# Patient Record
Sex: FEMALE | Race: WHITE | NOT HISPANIC OR LATINO | Employment: OTHER | URBAN - METROPOLITAN AREA
[De-identification: names, ages, dates, MRNs, and addresses within clinical notes are randomized per-mention and may not be internally consistent; named-entity substitution may affect disease eponyms.]

---

## 2018-02-28 ENCOUNTER — APPOINTMENT (OUTPATIENT)
Dept: GENERAL RADIOLOGY | Facility: HOSPITAL | Age: 83
End: 2018-02-28
Attending: HOSPITALIST

## 2018-02-28 ENCOUNTER — APPOINTMENT (OUTPATIENT)
Dept: CARDIOLOGY | Facility: HOSPITAL | Age: 83
End: 2018-02-28

## 2018-02-28 ENCOUNTER — HOSPITAL ENCOUNTER (INPATIENT)
Facility: HOSPITAL | Age: 83
LOS: 3 days | Discharge: HOME HEALTH | End: 2018-03-03
Attending: HOSPITALIST | Admitting: INTERNAL MEDICINE

## 2018-02-28 DIAGNOSIS — D50.0 ANEMIA DUE TO BLOOD LOSS: ICD-10-CM

## 2018-02-28 DIAGNOSIS — K92.2 LOWER GI BLEED: ICD-10-CM

## 2018-02-28 DIAGNOSIS — K92.2 GASTROINTESTINAL HEMORRHAGE, UNSPECIFIED GASTROINTESTINAL HEMORRHAGE TYPE: Primary | ICD-10-CM

## 2018-02-28 PROBLEM — I48.91 ATRIAL FIBRILLATION: Status: ACTIVE | Noted: 2018-02-28

## 2018-02-28 PROBLEM — D62 ANEMIA DUE TO BLOOD LOSS, ACUTE: Status: ACTIVE | Noted: 2018-02-28

## 2018-02-28 PROBLEM — I10 ESSENTIAL HYPERTENSION, BENIGN: Status: ACTIVE | Noted: 2018-02-28

## 2018-02-28 LAB
ABO GROUP BLD: NORMAL
APTT PPP: 55.3 SECONDS (ref 22.7–35.4)
BH CV LOWER VASCULAR LEFT COMMON FEMORAL AUGMENT: NORMAL
BH CV LOWER VASCULAR LEFT COMMON FEMORAL COMPETENT: NORMAL
BH CV LOWER VASCULAR LEFT COMMON FEMORAL COMPRESS: NORMAL
BH CV LOWER VASCULAR LEFT COMMON FEMORAL PHASIC: NORMAL
BH CV LOWER VASCULAR LEFT COMMON FEMORAL SPONT: NORMAL
BH CV LOWER VASCULAR LEFT DISTAL FEMORAL COMPRESS: NORMAL
BH CV LOWER VASCULAR LEFT GASTRONEMIUS COMPRESS: NORMAL
BH CV LOWER VASCULAR LEFT GREATER SAPH AK COMPRESS: NORMAL
BH CV LOWER VASCULAR LEFT GREATER SAPH BK COMPRESS: NORMAL
BH CV LOWER VASCULAR LEFT MID FEMORAL AUGMENT: NORMAL
BH CV LOWER VASCULAR LEFT MID FEMORAL COMPETENT: NORMAL
BH CV LOWER VASCULAR LEFT MID FEMORAL COMPRESS: NORMAL
BH CV LOWER VASCULAR LEFT MID FEMORAL PHASIC: NORMAL
BH CV LOWER VASCULAR LEFT MID FEMORAL SPONT: NORMAL
BH CV LOWER VASCULAR LEFT PERONEAL COMPRESS: NORMAL
BH CV LOWER VASCULAR LEFT POPLITEAL AUGMENT: NORMAL
BH CV LOWER VASCULAR LEFT POPLITEAL COMPETENT: NORMAL
BH CV LOWER VASCULAR LEFT POPLITEAL COMPRESS: NORMAL
BH CV LOWER VASCULAR LEFT POPLITEAL PHASIC: NORMAL
BH CV LOWER VASCULAR LEFT POPLITEAL SPONT: NORMAL
BH CV LOWER VASCULAR LEFT POSTERIOR TIBIAL COMPRESS: NORMAL
BH CV LOWER VASCULAR LEFT PROXIMAL FEMORAL COMPRESS: NORMAL
BH CV LOWER VASCULAR LEFT SAPHENOFEMORAL JUNCTION AUGMENT: NORMAL
BH CV LOWER VASCULAR LEFT SAPHENOFEMORAL JUNCTION COMPETENT: NORMAL
BH CV LOWER VASCULAR LEFT SAPHENOFEMORAL JUNCTION COMPRESS: NORMAL
BH CV LOWER VASCULAR LEFT SAPHENOFEMORAL JUNCTION PHASIC: NORMAL
BH CV LOWER VASCULAR LEFT SAPHENOFEMORAL JUNCTION SPONT: NORMAL
BH CV LOWER VASCULAR RIGHT COMMON FEMORAL AUGMENT: NORMAL
BH CV LOWER VASCULAR RIGHT COMMON FEMORAL COMPETENT: NORMAL
BH CV LOWER VASCULAR RIGHT COMMON FEMORAL COMPRESS: NORMAL
BH CV LOWER VASCULAR RIGHT COMMON FEMORAL PHASIC: NORMAL
BH CV LOWER VASCULAR RIGHT COMMON FEMORAL SPONT: NORMAL
BH CV LOWER VASCULAR RIGHT DISTAL FEMORAL COMPRESS: NORMAL
BH CV LOWER VASCULAR RIGHT GASTRONEMIUS COMPRESS: NORMAL
BH CV LOWER VASCULAR RIGHT GREATER SAPH AK COMPRESS: NORMAL
BH CV LOWER VASCULAR RIGHT GREATER SAPH BK COMPRESS: NORMAL
BH CV LOWER VASCULAR RIGHT MID FEMORAL AUGMENT: NORMAL
BH CV LOWER VASCULAR RIGHT MID FEMORAL COMPETENT: NORMAL
BH CV LOWER VASCULAR RIGHT MID FEMORAL COMPRESS: NORMAL
BH CV LOWER VASCULAR RIGHT MID FEMORAL PHASIC: NORMAL
BH CV LOWER VASCULAR RIGHT MID FEMORAL SPONT: NORMAL
BH CV LOWER VASCULAR RIGHT PERONEAL COMPRESS: NORMAL
BH CV LOWER VASCULAR RIGHT POPLITEAL AUGMENT: NORMAL
BH CV LOWER VASCULAR RIGHT POPLITEAL COMPETENT: NORMAL
BH CV LOWER VASCULAR RIGHT POPLITEAL COMPRESS: NORMAL
BH CV LOWER VASCULAR RIGHT POPLITEAL PHASIC: NORMAL
BH CV LOWER VASCULAR RIGHT POPLITEAL SPONT: NORMAL
BH CV LOWER VASCULAR RIGHT POSTERIOR TIBIAL COMPRESS: NORMAL
BH CV LOWER VASCULAR RIGHT PROXIMAL FEMORAL COMPRESS: NORMAL
BH CV LOWER VASCULAR RIGHT SAPHENOFEMORAL JUNCTION AUGMENT: NORMAL
BH CV LOWER VASCULAR RIGHT SAPHENOFEMORAL JUNCTION COMPETENT: NORMAL
BH CV LOWER VASCULAR RIGHT SAPHENOFEMORAL JUNCTION COMPRESS: NORMAL
BH CV LOWER VASCULAR RIGHT SAPHENOFEMORAL JUNCTION PHASIC: NORMAL
BH CV LOWER VASCULAR RIGHT SAPHENOFEMORAL JUNCTION SPONT: NORMAL
BLD GP AB SCN SERPL QL: NEGATIVE
DEPRECATED RDW RBC AUTO: 63.5 FL (ref 37–54)
ERYTHROCYTE [DISTWIDTH] IN BLOOD BY AUTOMATED COUNT: 20.3 % (ref 11.7–13)
HCT VFR BLD AUTO: 19 % (ref 35.6–45.5)
HGB BLD-MCNC: 5.3 G/DL (ref 11.9–15.5)
MCH RBC QN AUTO: 23.9 PG (ref 26.9–32)
MCHC RBC AUTO-ENTMCNC: 27.9 G/DL (ref 32.4–36.3)
MCV RBC AUTO: 85.6 FL (ref 80.5–98.2)
PLATELET # BLD AUTO: 527 10*3/MM3 (ref 140–500)
PMV BLD AUTO: 10 FL (ref 6–12)
RBC # BLD AUTO: 2.22 10*6/MM3 (ref 3.9–5.2)
RH BLD: NEGATIVE
WBC NRBC COR # BLD: 10.04 10*3/MM3 (ref 4.5–10.7)

## 2018-02-28 PROCEDURE — 93010 ELECTROCARDIOGRAM REPORT: CPT | Performed by: INTERNAL MEDICINE

## 2018-02-28 PROCEDURE — 93306 TTE W/DOPPLER COMPLETE: CPT | Performed by: INTERNAL MEDICINE

## 2018-02-28 PROCEDURE — 71045 X-RAY EXAM CHEST 1 VIEW: CPT

## 2018-02-28 PROCEDURE — 86901 BLOOD TYPING SEROLOGIC RH(D): CPT | Performed by: INTERNAL MEDICINE

## 2018-02-28 PROCEDURE — 86901 BLOOD TYPING SEROLOGIC RH(D): CPT

## 2018-02-28 PROCEDURE — 86900 BLOOD TYPING SEROLOGIC ABO: CPT

## 2018-02-28 PROCEDURE — 93005 ELECTROCARDIOGRAM TRACING: CPT | Performed by: NURSE PRACTITIONER

## 2018-02-28 PROCEDURE — 86923 COMPATIBILITY TEST ELECTRIC: CPT

## 2018-02-28 PROCEDURE — 86900 BLOOD TYPING SEROLOGIC ABO: CPT | Performed by: INTERNAL MEDICINE

## 2018-02-28 PROCEDURE — 93970 EXTREMITY STUDY: CPT

## 2018-02-28 PROCEDURE — 36430 TRANSFUSION BLD/BLD COMPNT: CPT

## 2018-02-28 PROCEDURE — 99221 1ST HOSP IP/OBS SF/LOW 40: CPT | Performed by: INTERNAL MEDICINE

## 2018-02-28 PROCEDURE — P9016 RBC LEUKOCYTES REDUCED: HCPCS

## 2018-02-28 PROCEDURE — 85730 THROMBOPLASTIN TIME PARTIAL: CPT | Performed by: HOSPITALIST

## 2018-02-28 PROCEDURE — 85027 COMPLETE CBC AUTOMATED: CPT | Performed by: HOSPITALIST

## 2018-02-28 PROCEDURE — 93306 TTE W/DOPPLER COMPLETE: CPT

## 2018-02-28 PROCEDURE — 86850 RBC ANTIBODY SCREEN: CPT | Performed by: INTERNAL MEDICINE

## 2018-02-28 RX ORDER — HYDROCHLOROTHIAZIDE 12.5 MG/1
12.5 TABLET ORAL DAILY
COMMUNITY
End: 2022-05-31 | Stop reason: HOSPADM

## 2018-02-28 RX ORDER — POLYETHYLENE GLYCOL 3350 17 G/17G
17 POWDER, FOR SOLUTION ORAL DAILY
COMMUNITY
End: 2022-08-10

## 2018-02-28 RX ORDER — ASPIRIN 81 MG/1
81 TABLET, CHEWABLE ORAL DAILY
COMMUNITY
End: 2022-11-10 | Stop reason: HOSPADM

## 2018-02-28 RX ORDER — ALENDRONATE SODIUM 70 MG/1
70 TABLET ORAL
COMMUNITY
Start: 2018-02-27 | End: 2022-11-10 | Stop reason: HOSPADM

## 2018-02-28 RX ORDER — ONDANSETRON 2 MG/ML
4 INJECTION INTRAMUSCULAR; INTRAVENOUS EVERY 6 HOURS PRN
Status: DISCONTINUED | OUTPATIENT
Start: 2018-02-28 | End: 2018-03-03 | Stop reason: HOSPADM

## 2018-02-28 RX ORDER — MIRTAZAPINE 15 MG/1
15 TABLET, FILM COATED ORAL NIGHTLY
COMMUNITY

## 2018-02-28 RX ORDER — POTASSIUM CHLORIDE 750 MG/1
20 TABLET, FILM COATED, EXTENDED RELEASE ORAL NIGHTLY
COMMUNITY
End: 2022-05-31 | Stop reason: HOSPADM

## 2018-02-28 RX ORDER — SODIUM CHLORIDE 0.9 % (FLUSH) 0.9 %
1-10 SYRINGE (ML) INJECTION AS NEEDED
Status: DISCONTINUED | OUTPATIENT
Start: 2018-02-28 | End: 2018-03-03 | Stop reason: HOSPADM

## 2018-02-28 RX ORDER — OMEPRAZOLE 20 MG/1
20 CAPSULE, DELAYED RELEASE ORAL DAILY
COMMUNITY
End: 2022-11-10 | Stop reason: HOSPADM

## 2018-02-28 RX ORDER — GABAPENTIN 100 MG/1
100 CAPSULE ORAL 2 TIMES DAILY
Status: DISCONTINUED | OUTPATIENT
Start: 2018-02-28 | End: 2018-03-03 | Stop reason: HOSPADM

## 2018-02-28 RX ORDER — AMLODIPINE BESYLATE 5 MG/1
5 TABLET ORAL DAILY
COMMUNITY
End: 2022-11-10 | Stop reason: HOSPADM

## 2018-02-28 RX ORDER — AMIODARONE HYDROCHLORIDE 200 MG/1
200 TABLET ORAL DAILY
COMMUNITY

## 2018-02-28 RX ORDER — TRAMADOL HYDROCHLORIDE 50 MG/1
50 TABLET ORAL EVERY 6 HOURS PRN
Status: DISCONTINUED | OUTPATIENT
Start: 2018-02-28 | End: 2018-03-03 | Stop reason: HOSPADM

## 2018-02-28 RX ORDER — POLYETHYLENE GLYCOL 3350, SODIUM CHLORIDE, POTASSIUM CHLORIDE, SODIUM BICARBONATE, AND SODIUM SULFATE 240; 5.84; 2.98; 6.72; 22.72 G/4L; G/4L; G/4L; G/4L; G/4L
2000 POWDER, FOR SOLUTION ORAL 2 TIMES DAILY
Status: DISCONTINUED | OUTPATIENT
Start: 2018-02-28 | End: 2018-02-28

## 2018-02-28 RX ORDER — ACETAMINOPHEN 500 MG
1000 TABLET ORAL EVERY 4 HOURS PRN
Status: ON HOLD | COMMUNITY
End: 2022-05-25

## 2018-02-28 RX ORDER — POLYETHYLENE GLYCOL 3350 17 G/17G
17 POWDER, FOR SOLUTION ORAL DAILY
Status: DISCONTINUED | OUTPATIENT
Start: 2018-02-28 | End: 2018-03-03 | Stop reason: HOSPADM

## 2018-02-28 RX ORDER — AMIODARONE HYDROCHLORIDE 200 MG/1
100 TABLET ORAL 2 TIMES DAILY
Status: DISCONTINUED | OUTPATIENT
Start: 2018-02-28 | End: 2018-03-03 | Stop reason: HOSPADM

## 2018-02-28 RX ORDER — GABAPENTIN 100 MG/1
100 CAPSULE ORAL 2 TIMES DAILY
COMMUNITY

## 2018-02-28 RX ORDER — BISACODYL 5 MG/1
5 TABLET, DELAYED RELEASE ORAL DAILY PRN
COMMUNITY
End: 2018-03-03 | Stop reason: HOSPADM

## 2018-02-28 RX ORDER — MIRTAZAPINE 15 MG/1
15 TABLET, FILM COATED ORAL NIGHTLY
Status: DISCONTINUED | OUTPATIENT
Start: 2018-02-28 | End: 2018-03-03 | Stop reason: HOSPADM

## 2018-02-28 RX ORDER — AMLODIPINE BESYLATE 5 MG/1
5 TABLET ORAL DAILY
Status: DISCONTINUED | OUTPATIENT
Start: 2018-02-28 | End: 2018-03-03 | Stop reason: HOSPADM

## 2018-02-28 RX ORDER — DOCUSATE SODIUM 100 MG/1
100 CAPSULE, LIQUID FILLED ORAL 2 TIMES DAILY PRN
COMMUNITY

## 2018-02-28 RX ORDER — TRAMADOL HYDROCHLORIDE 50 MG/1
50 TABLET ORAL EVERY 6 HOURS PRN
COMMUNITY
End: 2022-05-31 | Stop reason: HOSPADM

## 2018-02-28 RX ORDER — OMEGA-3S/DHA/EPA/FISH OIL/D3 300MG-1000
400 CAPSULE ORAL 2 TIMES DAILY
COMMUNITY

## 2018-02-28 RX ORDER — PANTOPRAZOLE SODIUM 40 MG/1
40 TABLET, DELAYED RELEASE ORAL EVERY MORNING
Status: DISCONTINUED | OUTPATIENT
Start: 2018-02-28 | End: 2018-03-03 | Stop reason: HOSPADM

## 2018-02-28 RX ADMIN — GABAPENTIN 100 MG: 100 CAPSULE ORAL at 21:20

## 2018-02-28 RX ADMIN — AMLODIPINE BESYLATE 5 MG: 5 TABLET ORAL at 10:17

## 2018-02-28 RX ADMIN — PANTOPRAZOLE SODIUM 40 MG: 40 TABLET, DELAYED RELEASE ORAL at 10:17

## 2018-02-28 RX ADMIN — GABAPENTIN 100 MG: 100 CAPSULE ORAL at 10:17

## 2018-02-28 RX ADMIN — AMIODARONE HYDROCHLORIDE 100 MG: 200 TABLET ORAL at 21:19

## 2018-02-28 RX ADMIN — METOPROLOL TARTRATE 12.5 MG: 25 TABLET ORAL at 10:17

## 2018-02-28 RX ADMIN — AMIODARONE HYDROCHLORIDE 100 MG: 200 TABLET ORAL at 10:17

## 2018-02-28 RX ADMIN — MIRTAZAPINE 15 MG: 15 TABLET, FILM COATED ORAL at 21:20

## 2018-02-28 RX ADMIN — METOPROLOL TARTRATE 12.5 MG: 25 TABLET ORAL at 21:50

## 2018-03-01 PROBLEM — D68.32 HEMORRHAGIC DISORDER DUE TO EXTRINSIC CIRCULATING ANTICOAGULANTS: Status: ACTIVE | Noted: 2018-03-01

## 2018-03-01 PROBLEM — I50.33 ACUTE ON CHRONIC DIASTOLIC CONGESTIVE HEART FAILURE: Status: ACTIVE | Noted: 2018-03-01

## 2018-03-01 PROBLEM — J96.01 ACUTE RESPIRATORY FAILURE WITH HYPOXIA: Status: ACTIVE | Noted: 2018-03-01

## 2018-03-01 LAB
ABO + RH BLD: NORMAL
ALBUMIN SERPL-MCNC: 2.8 G/DL (ref 3.5–5.2)
ALBUMIN/GLOB SERPL: 0.8 G/DL
ALP SERPL-CCNC: 79 U/L (ref 39–117)
ALT SERPL W P-5'-P-CCNC: 5 U/L (ref 1–33)
ANION GAP SERPL CALCULATED.3IONS-SCNC: 13.1 MMOL/L
ARTERIAL PATENCY WRIST A: POSITIVE
AST SERPL-CCNC: 13 U/L (ref 1–32)
ATMOSPHERIC PRESS: 741.1 MMHG
BASE EXCESS BLDA CALC-SCNC: 1 MMOL/L (ref 0–2)
BASOPHILS # BLD AUTO: 0.02 10*3/MM3 (ref 0–0.2)
BASOPHILS NFR BLD AUTO: 0.2 % (ref 0–1.5)
BDY SITE: ABNORMAL
BH BB BLOOD EXPIRATION DATE: NORMAL
BH BB BLOOD TYPE BARCODE: 9500
BH BB DISPENSE STATUS: NORMAL
BH BB PRODUCT CODE: NORMAL
BH BB UNIT NUMBER: NORMAL
BH CV ECHO MEAS - ACS: 2 CM
BH CV ECHO MEAS - AO MEAN PG (FULL): 1 MMHG
BH CV ECHO MEAS - AO MEAN PG: 4 MMHG
BH CV ECHO MEAS - AO ROOT AREA (BSA CORRECTED): 1.7
BH CV ECHO MEAS - AO ROOT AREA: 6.6 CM^2
BH CV ECHO MEAS - AO ROOT DIAM: 2.9 CM
BH CV ECHO MEAS - AO V2 MEAN: 96.1 CM/SEC
BH CV ECHO MEAS - AO V2 VTI: 35.9 CM
BH CV ECHO MEAS - AVA(I,A): 2.3 CM^2
BH CV ECHO MEAS - AVA(I,D): 2.3 CM^2
BH CV ECHO MEAS - BSA(HAYCOCK): 1.8 M^2
BH CV ECHO MEAS - BSA: 1.7 M^2
BH CV ECHO MEAS - BZI_BMI: 25.4 KILOGRAMS/M^2
BH CV ECHO MEAS - BZI_METRIC_HEIGHT: 162.6 CM
BH CV ECHO MEAS - BZI_METRIC_WEIGHT: 67.1 KG
BH CV ECHO MEAS - CONTRAST EF (2CH): 54.5 ML/M^2
BH CV ECHO MEAS - CONTRAST EF 4CH: 62.9 ML/M^2
BH CV ECHO MEAS - EDV(CUBED): 103.8 ML
BH CV ECHO MEAS - EDV(MOD-SP2): 110 ML
BH CV ECHO MEAS - EDV(MOD-SP4): 97 ML
BH CV ECHO MEAS - EDV(TEICH): 102.4 ML
BH CV ECHO MEAS - EF(CUBED): 55.1 %
BH CV ECHO MEAS - EF(MOD-SP2): 54.5 %
BH CV ECHO MEAS - EF(MOD-SP4): 62.9 %
BH CV ECHO MEAS - EF(TEICH): 46.8 %
BH CV ECHO MEAS - ESV(CUBED): 46.7 ML
BH CV ECHO MEAS - ESV(MOD-SP2): 50 ML
BH CV ECHO MEAS - ESV(MOD-SP4): 36 ML
BH CV ECHO MEAS - ESV(TEICH): 54.4 ML
BH CV ECHO MEAS - FS: 23.4 %
BH CV ECHO MEAS - IVS/LVPW: 1
BH CV ECHO MEAS - IVSD: 0.9 CM
BH CV ECHO MEAS - LAT PEAK E' VEL: 8 CM/SEC
BH CV ECHO MEAS - LV DIASTOLIC VOL/BSA (35-75): 56.4 ML/M^2
BH CV ECHO MEAS - LV MASS(C)D: 142.7 GRAMS
BH CV ECHO MEAS - LV MASS(C)DI: 82.9 GRAMS/M^2
BH CV ECHO MEAS - LV MEAN PG: 3 MMHG
BH CV ECHO MEAS - LV SYSTOLIC VOL/BSA (12-30): 20.9 ML/M^2
BH CV ECHO MEAS - LV V1 MAX: 120 CM/SEC
BH CV ECHO MEAS - LV V1 MEAN: 76.4 CM/SEC
BH CV ECHO MEAS - LV V1 VTI: 28.8 CM
BH CV ECHO MEAS - LVIDD: 4.7 CM
BH CV ECHO MEAS - LVIDS: 3.6 CM
BH CV ECHO MEAS - LVLD AP2: 8.2 CM
BH CV ECHO MEAS - LVLD AP4: 7.7 CM
BH CV ECHO MEAS - LVLS AP2: 7.2 CM
BH CV ECHO MEAS - LVLS AP4: 6.5 CM
BH CV ECHO MEAS - LVOT AREA (M): 2.8 CM^2
BH CV ECHO MEAS - LVOT AREA: 2.8 CM^2
BH CV ECHO MEAS - LVOT DIAM: 1.9 CM
BH CV ECHO MEAS - LVPWD: 0.9 CM
BH CV ECHO MEAS - MED PEAK E' VEL: 9 CM/SEC
BH CV ECHO MEAS - MR MAX PG: 68.4 MMHG
BH CV ECHO MEAS - MR MAX VEL: 413.5 CM/SEC
BH CV ECHO MEAS - MV A DUR: 0.11 SEC
BH CV ECHO MEAS - MV A MAX VEL: 87.1 CM/SEC
BH CV ECHO MEAS - MV DEC SLOPE: 785 CM/SEC^2
BH CV ECHO MEAS - MV DEC TIME: 0.19 SEC
BH CV ECHO MEAS - MV E MAX VEL: 116 CM/SEC
BH CV ECHO MEAS - MV E/A: 1.3
BH CV ECHO MEAS - MV MEAN PG: 2 MMHG
BH CV ECHO MEAS - MV P1/2T MAX VEL: 153 CM/SEC
BH CV ECHO MEAS - MV P1/2T: 57.1 MSEC
BH CV ECHO MEAS - MV V2 MEAN: 68.9 CM/SEC
BH CV ECHO MEAS - MV V2 VTI: 38.4 CM
BH CV ECHO MEAS - MVA P1/2T LCG: 1.4 CM^2
BH CV ECHO MEAS - MVA(P1/2T): 3.9 CM^2
BH CV ECHO MEAS - MVA(VTI): 2.1 CM^2
BH CV ECHO MEAS - PA ACC SLOPE: 16.8 CM/SEC^2
BH CV ECHO MEAS - PA ACC TIME: 0.16 SEC
BH CV ECHO MEAS - PA MAX PG (FULL): 1.5 MMHG
BH CV ECHO MEAS - PA MAX PG: 2.9 MMHG
BH CV ECHO MEAS - PA PR(ACCEL): 6.1 MMHG
BH CV ECHO MEAS - PA V2 MAX: 85.5 CM/SEC
BH CV ECHO MEAS - PULM A REVS DUR: 0.12 SEC
BH CV ECHO MEAS - PULM A REVS VEL: 29.1 CM/SEC
BH CV ECHO MEAS - PULM DIAS VEL: 66.1 CM/SEC
BH CV ECHO MEAS - PULM S/D: 0.85
BH CV ECHO MEAS - PULM SYS VEL: 56.3 CM/SEC
BH CV ECHO MEAS - PVA(V,A): 2.7 CM^2
BH CV ECHO MEAS - PVA(V,D): 2.7 CM^2
BH CV ECHO MEAS - QP/QS: 0.8
BH CV ECHO MEAS - RAP SYSTOLE: 15 MMHG
BH CV ECHO MEAS - RV MAX PG: 1.4 MMHG
BH CV ECHO MEAS - RV MEAN PG: 1 MMHG
BH CV ECHO MEAS - RV V1 MAX: 60.2 CM/SEC
BH CV ECHO MEAS - RV V1 MEAN: 39.9 CM/SEC
BH CV ECHO MEAS - RV V1 VTI: 17.1 CM
BH CV ECHO MEAS - RVOT AREA: 3.8 CM^2
BH CV ECHO MEAS - RVOT DIAM: 2.2 CM
BH CV ECHO MEAS - RVSP: 53 MMHG
BH CV ECHO MEAS - SI(AO): 137.8 ML/M^2
BH CV ECHO MEAS - SI(CUBED): 33.2 ML/M^2
BH CV ECHO MEAS - SI(LVOT): 47.4 ML/M^2
BH CV ECHO MEAS - SI(MOD-SP2): 34.9 ML/M^2
BH CV ECHO MEAS - SI(MOD-SP4): 35.4 ML/M^2
BH CV ECHO MEAS - SI(TEICH): 27.8 ML/M^2
BH CV ECHO MEAS - SUP REN AO DIAM: 2.3 CM
BH CV ECHO MEAS - SV(AO): 237.1 ML
BH CV ECHO MEAS - SV(CUBED): 57.2 ML
BH CV ECHO MEAS - SV(LVOT): 81.7 ML
BH CV ECHO MEAS - SV(MOD-SP2): 60 ML
BH CV ECHO MEAS - SV(MOD-SP4): 61 ML
BH CV ECHO MEAS - SV(RVOT): 65 ML
BH CV ECHO MEAS - SV(TEICH): 47.9 ML
BH CV ECHO MEAS - TAPSE (>1.6): 2.1 CM2
BH CV ECHO MEAS - TR MAX VEL: 308 CM/SEC
BH CV VAS BP RIGHT ARM: NORMAL MMHG
BH CV XLRA - RV BASE: 2.3 CM
BH CV XLRA - TDI S': 11 CM/SEC
BILIRUB SERPL-MCNC: 1.1 MG/DL (ref 0.1–1.2)
BUN BLD-MCNC: 14 MG/DL (ref 8–23)
BUN/CREAT SERPL: 16.1 (ref 7–25)
CALCIUM SPEC-SCNC: 8.4 MG/DL (ref 8.6–10.5)
CHLORIDE SERPL-SCNC: 98 MMOL/L (ref 98–107)
CO2 SERPL-SCNC: 27.9 MMOL/L (ref 22–29)
CREAT BLD-MCNC: 0.87 MG/DL (ref 0.57–1)
DEPRECATED RDW RBC AUTO: 58 FL (ref 37–54)
E/E' RATIO: 14
EOSINOPHIL # BLD AUTO: 0.07 10*3/MM3 (ref 0–0.7)
EOSINOPHIL NFR BLD AUTO: 0.6 % (ref 0.3–6.2)
ERYTHROCYTE [DISTWIDTH] IN BLOOD BY AUTOMATED COUNT: 18.4 % (ref 11.7–13)
GAS FLOW AIRWAY: 7 LPM
GFR SERPL CREATININE-BSD FRML MDRD: 62 ML/MIN/1.73
GLOBULIN UR ELPH-MCNC: 3.6 GM/DL
GLUCOSE BLD-MCNC: 84 MG/DL (ref 65–99)
HCO3 BLDA-SCNC: 25 MMOL/L (ref 22–28)
HCT VFR BLD AUTO: 34.7 % (ref 35.6–45.5)
HGB BLD-MCNC: 10.6 G/DL (ref 11.9–15.5)
IMM GRANULOCYTES # BLD: 0.05 10*3/MM3 (ref 0–0.03)
IMM GRANULOCYTES NFR BLD: 0.4 % (ref 0–0.5)
INR PPP: 1.45 (ref 0.9–1.1)
IRON 24H UR-MRATE: 31 MCG/DL (ref 37–145)
IRON SATN MFR SERPL: 7 % (ref 20–50)
LEFT ATRIUM VOLUME INDEX: 42 ML/M2
LYMPHOCYTES # BLD AUTO: 1.68 10*3/MM3 (ref 0.9–4.8)
LYMPHOCYTES NFR BLD AUTO: 14.2 % (ref 19.6–45.3)
MAXIMAL PREDICTED HEART RATE: 133 BPM
MCH RBC QN AUTO: 26.2 PG (ref 26.9–32)
MCHC RBC AUTO-ENTMCNC: 30.5 G/DL (ref 32.4–36.3)
MCV RBC AUTO: 85.7 FL (ref 80.5–98.2)
MODALITY: ABNORMAL
MONOCYTES # BLD AUTO: 1.39 10*3/MM3 (ref 0.2–1.2)
MONOCYTES NFR BLD AUTO: 11.8 % (ref 5–12)
NEUTROPHILS # BLD AUTO: 8.58 10*3/MM3 (ref 1.9–8.1)
NEUTROPHILS NFR BLD AUTO: 72.8 % (ref 42.7–76)
NRBC BLD MANUAL-RTO: 0.6 /100 WBC (ref 0–0)
NT-PROBNP SERPL-MCNC: 3392 PG/ML (ref 0–1800)
PCO2 BLDA: 36.4 MM HG (ref 35–45)
PH BLDA: 7.45 PH UNITS (ref 7.35–7.45)
PLATELET # BLD AUTO: 504 10*3/MM3 (ref 140–500)
PMV BLD AUTO: 10.4 FL (ref 6–12)
PO2 BLDA: 61.3 MM HG (ref 80–100)
POTASSIUM BLD-SCNC: 3.6 MMOL/L (ref 3.5–5.2)
PROT SERPL-MCNC: 6.4 G/DL (ref 6–8.5)
PROTHROMBIN TIME: 17.4 SECONDS (ref 11.7–14.2)
RBC # BLD AUTO: 4.05 10*6/MM3 (ref 3.9–5.2)
SAO2 % BLDCOA: 92.2 % (ref 92–99)
SET MECH RESP RATE: 22
SODIUM BLD-SCNC: 139 MMOL/L (ref 136–145)
STRESS TARGET HR: 113 BPM
TIBC SERPL-MCNC: 426 MCG/DL
TRANSFERRIN SERPL-MCNC: 286 MG/DL (ref 200–360)
UNIT  ABO: NORMAL
UNIT  RH: NORMAL
WBC NRBC COR # BLD: 11.79 10*3/MM3 (ref 4.5–10.7)

## 2018-03-01 PROCEDURE — 36600 WITHDRAWAL OF ARTERIAL BLOOD: CPT

## 2018-03-01 PROCEDURE — 94799 UNLISTED PULMONARY SVC/PX: CPT

## 2018-03-01 PROCEDURE — 93010 ELECTROCARDIOGRAM REPORT: CPT | Performed by: INTERNAL MEDICINE

## 2018-03-01 PROCEDURE — 84443 ASSAY THYROID STIM HORMONE: CPT | Performed by: NURSE PRACTITIONER

## 2018-03-01 PROCEDURE — 99232 SBSQ HOSP IP/OBS MODERATE 35: CPT | Performed by: INTERNAL MEDICINE

## 2018-03-01 PROCEDURE — 83880 ASSAY OF NATRIURETIC PEPTIDE: CPT | Performed by: INTERNAL MEDICINE

## 2018-03-01 PROCEDURE — 85610 PROTHROMBIN TIME: CPT | Performed by: HOSPITALIST

## 2018-03-01 PROCEDURE — 25010000002 FUROSEMIDE PER 20 MG: Performed by: HOSPITALIST

## 2018-03-01 PROCEDURE — 25010000002 ONDANSETRON PER 1 MG: Performed by: INTERNAL MEDICINE

## 2018-03-01 PROCEDURE — 84466 ASSAY OF TRANSFERRIN: CPT | Performed by: HOSPITALIST

## 2018-03-01 PROCEDURE — 80053 COMPREHEN METABOLIC PANEL: CPT | Performed by: HOSPITALIST

## 2018-03-01 PROCEDURE — 93005 ELECTROCARDIOGRAM TRACING: CPT | Performed by: NURSE PRACTITIONER

## 2018-03-01 PROCEDURE — 83540 ASSAY OF IRON: CPT | Performed by: HOSPITALIST

## 2018-03-01 PROCEDURE — 82803 BLOOD GASES ANY COMBINATION: CPT

## 2018-03-01 PROCEDURE — 85025 COMPLETE CBC W/AUTO DIFF WBC: CPT | Performed by: NURSE PRACTITIONER

## 2018-03-01 RX ORDER — FUROSEMIDE 10 MG/ML
40 INJECTION INTRAMUSCULAR; INTRAVENOUS ONCE
Status: COMPLETED | OUTPATIENT
Start: 2018-03-01 | End: 2018-03-01

## 2018-03-01 RX ADMIN — FUROSEMIDE 40 MG: 10 INJECTION, SOLUTION INTRAMUSCULAR; INTRAVENOUS at 02:56

## 2018-03-01 RX ADMIN — POLYETHYLENE GLYCOL (3350) 17 G: 17 POWDER, FOR SOLUTION ORAL at 08:35

## 2018-03-01 RX ADMIN — AMIODARONE HYDROCHLORIDE 100 MG: 200 TABLET ORAL at 08:35

## 2018-03-01 RX ADMIN — PANTOPRAZOLE SODIUM 40 MG: 40 TABLET, DELAYED RELEASE ORAL at 06:06

## 2018-03-01 RX ADMIN — TRAMADOL HYDROCHLORIDE 50 MG: 50 TABLET, FILM COATED ORAL at 00:19

## 2018-03-01 RX ADMIN — GABAPENTIN 100 MG: 100 CAPSULE ORAL at 20:10

## 2018-03-01 RX ADMIN — METOPROLOL TARTRATE 12.5 MG: 25 TABLET ORAL at 20:11

## 2018-03-01 RX ADMIN — AMIODARONE HYDROCHLORIDE 100 MG: 200 TABLET ORAL at 20:09

## 2018-03-01 RX ADMIN — MIRTAZAPINE 15 MG: 15 TABLET, FILM COATED ORAL at 20:10

## 2018-03-01 RX ADMIN — GABAPENTIN 100 MG: 100 CAPSULE ORAL at 08:35

## 2018-03-01 RX ADMIN — METOPROLOL TARTRATE 12.5 MG: 25 TABLET ORAL at 08:35

## 2018-03-01 RX ADMIN — AMLODIPINE BESYLATE 5 MG: 5 TABLET ORAL at 08:35

## 2018-03-01 RX ADMIN — ONDANSETRON 4 MG: 2 INJECTION INTRAMUSCULAR; INTRAVENOUS at 01:55

## 2018-03-01 RX ADMIN — TRAMADOL HYDROCHLORIDE 50 MG: 50 TABLET, FILM COATED ORAL at 20:10

## 2018-03-01 NOTE — PLAN OF CARE
Problem: Patient Care Overview (Adult)  Goal: Plan of Care Review  Outcome: Ongoing (interventions implemented as appropriate)   03/01/18 0457   Coping/Psychosocial Response Interventions   Plan Of Care Reviewed With patient   Patient Care Overview   Progress no change   Outcome Evaluation   Outcome Summary/Follow up Plan PT SLEEPING BETWEEEN CARE, TRANSFUSED 1PRBC WITH NO ADVERSE REACTIONS. PT HAD PROBLEM WITH KEEPINGS O2 SATS AT 90% AND ABOVE AFTER LAST BLOOD TRANSFUSION, NOTIFIED RT TO EVAL, PT WSITCH TO HIFLOW 02 SINCE 6LPM/NC WASNT EFFECTIVE,, CARY UP TO 90-91% ON HIFLOW,7LPMNC. LUNGS DIMINISHED AND JEANNETTE CERRATO MD. CALLED. ORDERS PER DR. SOL FOR IV LASIX 40MG X1.. HOB ELEVATED, NO FURTEHR SOB. SATS STAYING AT 92-93% ON HI FLOW.      Goal: Adult Individualization and Mutuality  Outcome: Ongoing (interventions implemented as appropriate)    Goal: Discharge Needs Assessment  Outcome: Ongoing (interventions implemented as appropriate)      Problem: Pain, Acute (Adult)  Goal: Identify Related Risk Factors and Signs and Symptoms  Outcome: Ongoing (interventions implemented as appropriate)    Goal: Acceptable Pain Control/Comfort Level  Outcome: Ongoing (interventions implemented as appropriate)      Problem: Mobility, Physical Impaired (Adult)  Goal: Identify Related Risk Factors and Signs and Symptoms  Outcome: Ongoing (interventions implemented as appropriate)    Goal: Enhanced Mobility Skills  Outcome: Ongoing (interventions implemented as appropriate)    Goal: Enhanced Functionality Ability  Outcome: Ongoing (interventions implemented as appropriate)      Problem: Wound, Traumatic, Nonburn (Adult)  Goal: Signs and Symptoms of Listed Potential Problems Will be Absent or Manageable (Wound, Traumatic, Nonburn)  Outcome: Ongoing (interventions implemented as appropriate)      Problem: Pressure Ulcer Risk (Curtis Scale) (Adult,Obstetrics,Pediatric)  Goal: Identify Related Risk Factors and Signs and  Symptoms  Outcome: Ongoing (interventions implemented as appropriate)    Goal: Skin Integrity  Outcome: Ongoing (interventions implemented as appropriate)

## 2018-03-01 NOTE — PROGRESS NOTES
Called regarding increased SOA and hypoxia. Placed on 7 L high flow with sats 85%. Initial CXR suggests edema/effusions. Hx AFib, CHF. Received 2 units pRBC. Will give stat dose Lasix 40mg IV. May need additional Lasix. Echo with normal EF. Cardiology following.     Paul De Jesus MD  [facility]  03/01/18  2:55 AM

## 2018-03-01 NOTE — PROGRESS NOTES
[facility]  Cardiology Progress Note    Patient Identification:  Name: Aria Fernando  Age: 87 y.o.  Sex: female  :  1930  MRN: [id number]                 Follow Up / Chief Complaint: AFIB ON XARELTO WITH ACUTE GIB    Interval History: 87-year-old patient  with a history of chronic atrial fibrillation, left internal carotid stenosis with history of left carotid endarterectomy with bovine pericardial patch by Dr. Wil Villalobos in , hyperlipidemia,  and hypertension.  Patient was transferred from [facility] with a hemoglobin of 4.1 after having a history of weakness and passing bright red blood per rectum for almost a week.  She has a history of atrial fibrillation and DVT and was on Xarelto as an outpatient.  She had previous history of bleeding while on Coumadin per EMR but does not recall this.   Pt has been transfused with  PRBC's and Hgb 10.6 3/ up from 5.3 on admit .      Subjective: shob noted while getting blood transfusions, no cp/pressure      Objective:  Was given IV lasix one time dose after blood transfusions d/t volume overload by attending    Temp:  [97.8 °F (36.6 °C)-98.8 °F (37.1 °C)] 98.8 °F (37.1 °C)  Heart Rate:  [64-75] 72  Resp:  [18-24] 19  BP: (111-172)/(65-86) 111/65    hgb 10.6 (5.3), creat 0.87 (1.14),  K+ 3.6    Past Medical History:  Past Medical History:   Diagnosis Date   • A-fib    • Anticoagulant-induced bleeding    • Arthritis    • CHF (congestive heart failure)    • Coronary artery disease    • DVT (deep venous thrombosis)    • GERD (gastroesophageal reflux disease)    • History of transfusion    • Hyperlipidemia    • Hypertension    • 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 No pertinent family history.       Allergies:  Allergies   Allergen Reactions   • Clinoril [Sulindac] Itching   • Codeine Itching   • Coumadin [Warfarin Sodium] GI Bleeding   • Ibuprofen-Oxycodone [Oxycodone-Ibuprofen] Itching   • Keflex [Cephalexin] Itching   • Mydriacyl [Tropicamide] Angioedema   • Penicillins Angioedema   • Phenylephrine Hcl Angioedema   • Sulfa Antibiotics Angioedema   • Zantac [Ranitidine Hcl] Nausea And Vomiting     Scheduled Meds:    amiodarone 100 mg BID   amLODIPine 5 mg Daily   gabapentin 100 mg BID   metoprolol tartrate 12.5 mg Q12H   mirtazapine 15 mg Nightly   pantoprazole 40 mg QAM   polyethylene glycol 17 g Daily           INTAKE AND OUTPUT:    Intake/Output Summary (Last 24 hours) at 03/01/18 1549  Last data filed at 03/01/18 0741   Gross per 24 hour   Intake              900 ml   Output             2950 ml   Net            -2050 ml       Review of Systems:   GI: no abd pain or n/v  Cardiac: no cp, + orthopnea  Pulmonary: + shob, denies cough    Constitutional:  Temp:  [97.8 °F (36.6 °C)-98.8 °F (37.1 °C)] 98.8 °F (37.1 °C)  Heart Rate:  [64-75] 72  Resp:  [18-24] 19  BP: (111-172)/(65-86) 111/65    Physical Exam:  General:  Appears in no acute distress  Eyes: PERTL,  HEENT:  No JVD. Thyroid not visibly enlarged. No mucosal pallor or cyanosis  Respiratory: Respirations regular and unlabored at rest. BBS with good air entry in all fields. No crackles, rubs or wheezes auscultated  Cardiovascular: S1S2 Regular rate and rhythm. No murmur, rub or gallop. No carotid bruits. DP/PT pulses  2+   . No pretibial pitting edema  Gastrointestinal: Abdomen soft, flat, non tender. Bowel sounds present. No hepatosplenomegaly. No ascites  Musculoskeletal: NUNES x4. No abnormal movements  Extremities: No digital clubbing or  cyanosis  Skin: Color pink. Skin warm and dry to touch. No rashes    Neuro: AAO x3 CN II-XII grossly intact  Psych: Mood and affect normal, pleasant and cooperative      Cardiographics  Telemetry:  SR        ECG:     3/1/18             Echocardiogram:   3/1/18     Interpretation Summary      · Left atrial cavity size is mildly dilated.  · There is calcification of the aortic valve.  · Mild-to-moderate mitral valve regurgitation is present  · Mild tricuspid valve regurgitation is present.  · Left Ventricle: Calculated EF = 62.9%.  · There is no evidence of pericardial effusion         Lab Review             Results from last 7 days  Lab Units 03/01/18  0532   SODIUM mmol/L 139   POTASSIUM mmol/L 3.6   BUN mg/dL 14   CREATININE mg/dL 0.87   CALCIUM mg/dL 8.4*       proBNP 3392 on 3/1/18      Results from last 7 days  Lab Units 03/01/18  0532 02/28/18  0715   WBC 10*3/mm3 11.79* 10.04   HEMOGLOBIN g/dL 10.6* 5.3*   HEMATOCRIT % 34.7* 19.0*   PLATELETS 10*3/mm3 504* 527*       Results from last 7 days  Lab Units 03/01/18  0532 02/28/18  0715   INR  1.45*  --    APTT seconds  --  55.3*     Venous doppler 2/28/18     Interpretation Summary      · Normal bilateral lower extremity venous duplex scan.                 Active Hospital Problems (** Indicates Principal Problem)      Diagnosis Date Noted   • Anemia due to blood loss [D50.0] 02/28/2018   • Lower GI bleed [K92.2] 02/28/2018   • Atrial fibrillation [I48.91] 02/28/2018   • Anemia due to blood loss, acute [D62] 02/28/2018         Assessment/Plan:        - Hx of AFib: currently SR, is off home xarelto 20 mg d/t acute GIB.  12 lead EKG showing NS ST wave abnormalities in lateral leads. Stable since admission on 3/1 ekg. Echo 2/28/18  showing mild to mod MR with Mild TR and EF 63%.        -  HTN: adequate control.       - GIB acute: GI following.  No BM since admission.     Hemodynamically stable.      - Recent DVT 11/2017: lower extremity venous doppler 2/28/18 normal.   "        Labs/tests ordered for am: cbc      I reviewed the patient's new clinical results and treatment plan  I personally viewed and interpreted the patient's EKG/Telemetry data    )3/1/2018  Chaz Nelson MD      EMR Dragon/Transcription:   \"Dictated utilizing Dragon dictation\".   "

## 2018-03-01 NOTE — PROGRESS NOTES
Name: Aria Fernando ADMIT: 2018   : 1930  PCP: Luci Ingram MD    MRN: [id number] LOS: 1 days   AGE/SEX: 87 y.o. female    ROOM: [facility]/   Subjective   Patient is sitting up in a chair on 4 L of oxygen on Oxymizer and the saturation 97%.  No chest pain or shortness breath.  She had good urine output for the Lasix.    Brief hospital course since admission:  GI bleed  Acute anemia secondary to GI bleed  History of atrial fibrillation and history of DVT, was on Xarelto to which is been on hold    I have reviewed past medical history, social hisotory, family history, allergies.  No changes from admission note.      Review of Systems   No chest pain  No shortness of breath  No more rectal bleeding  No BM    Objective   Vital Signs  Temp:  [97.8 °F (36.6 °C)-98.8 °F (37.1 °C)] 98.8 °F (37.1 °C)  Heart Rate:  [62-75] 75  Resp:  [18-24] 22  BP: (131-172)/(66-86) 136/66  SpO2:  [89 %-94 %] 93 %  on  Flow (L/min):  [2-7] 4;   O2 Device: high-flow nasal cannula  Body mass index is 24.41 kg/(m^2).    Intake/Output Summary (Last 24 hours) at 18 1316  Last data filed at 18 0741   Gross per 24 hour   Intake             1200 ml   Output             2950 ml   Net            -1750 ml       Physical Exam   Constitutional: She is oriented to person, place, and time. She appears well-developed and well-nourished. No distress. Nasal cannula in place.   HENT:   Head: Normocephalic and atraumatic.   Eyes: Pupils are equal, round, and reactive to light. No scleral icterus.   Cardiovascular: Normal rate.  An irregular rhythm present.   Pulmonary/Chest: Effort normal. No respiratory distress. She has no decreased breath sounds. She has no wheezes.   Abdominal: Soft. Bowel sounds are normal. There is no hepatosplenomegaly.   Neurological: She is alert and oriented to person, place, and time.   Skin: No cyanosis. Nails show no clubbing.   Psychiatric: She has a normal mood and affect. Her behavior is normal.        Results Review:      Results from last 7 days  Lab Units 03/01/18  0532 02/28/18  0715   WBC 10*3/mm3 11.79* 10.04   HEMOGLOBIN g/dL 10.6* 5.3*   HEMATOCRIT % 34.7* 19.0*   PLATELETS 10*3/mm3 504* 527*       Results from last 7 days  Lab Units 03/01/18  0532   SODIUM mmol/L 139   POTASSIUM mmol/L 3.6   CHLORIDE mmol/L 98   CO2 mmol/L 27.9   BUN mg/dL 14   CREATININE mg/dL 0.87   GLUCOSE mg/dL 84   CALCIUM mg/dL 8.4*       Results from last 7 days  Lab Units 03/01/18  0532   INR  1.45*     Hemoglobin A1C:No results found for: HGBA1C  Glucose Range:No results found for: POCGLU      amiodarone 100 mg Oral BID   amLODIPine 5 mg Oral Daily   gabapentin 100 mg Oral BID   metoprolol tartrate 12.5 mg Oral Q12H   mirtazapine 15 mg Oral Nightly   pantoprazole 40 mg Oral QAM   polyethylene glycol 17 g Oral Daily      Diet Regular; Cardiac, GI Soft / Dickinson      Assessment/Plan   Active Hospital Problems (** Indicates Principal Problem)    Diagnosis Date Noted   • Hemorrhagic disorder due to extrinsic circulating anticoagulants [D68.32] 03/01/2018   • Acute respiratory failure with hypoxia [J96.01] 03/01/2018   • Acute on chronic diastolic congestive heart failure [I50.33] 03/01/2018   • Lower GI bleed [K92.2] 02/28/2018   • Atrial fibrillation [I48.91] 02/28/2018   • Anemia due to blood loss, acute [D62] 02/28/2018   • Essential hypertension, benign [I10] 02/28/2018      Resolved Hospital Problems    Diagnosis Date Noted Date Resolved   No resolved problems to display.     Patient is being transfused and her hemoglobin stable.  She is off Xarelto  EGD and colonoscopy per GI  Hypoxia secondary to fluid overload and pulmonary edema which is been resolved with Lasix.  The fluid overload is due to blood transfusion.  Will continue to wean oxygen        Joni Espinoza MD  [facility]  03/01/18

## 2018-03-01 NOTE — NURSING NOTE
PT VOIDING FREQUENTLY PER BEDPAN POST LASIX IV ADMINISTRATION, O2 SATS 93% ON HIFLOW O2 AT 7LPM/NC. HOB ELEVATED, NAD. DENIES SOB.

## 2018-03-01 NOTE — PROGRESS NOTES
BGA/GI Progress Note   Chief Complaint: vomiting, melena and rectal bleeding    Subjective     Interval History:   No BM since admission. No GI complains. SOB  History taken from: patient chart RN    Review of Systems:    The following systems were reviewed and negative;  gastrointestinal  See HPI  Objective     Vital Signs  Temp:  [97.5 °F (36.4 °C)-98.8 °F (37.1 °C)] 98.8 °F (37.1 °C)  Heart Rate:  [62-75] 75  Resp:  [18-24] 22  BP: (124-172)/(62-86) 136/66  Body mass index is 24.41 kg/(m^2).    Intake/Output Summary (Last 24 hours) at 03/01/18 1110  Last data filed at 03/01/18 0741   Gross per 24 hour   Intake             1200 ml   Output             2950 ml   Net            -1750 ml     I/O this shift:  In: -   Out: 400 [Urine:400]    Physical Exam:   General: patient awake, alert and cooperative, on Hiflow NC 5 L   Eyes: Normal lids and lashes, no scleral icterus, no conjunctival pallor   Neck: supple, normal ROM, no tracheal deviation, no thyromegaly   Skin: warm and dry, not jaundiced   Cardiovascular: regular rhythm and rate, no murmurs auscultated   Pulm: clear to auscultation bilaterally,decreased BS bilaterally    Abdomen: soft, nontender, nondistended; normal bowel sounds   Rectal: deferred   Extremities: no rash or edema   Neurologic: Normal mood and behavior    All Medications Have Been Reviewed     Results Review:       Results from last 7 days  Lab Units 03/01/18  0532 02/28/18  0715   WBC 10*3/mm3 11.79* 10.04   HEMOGLOBIN g/dL 10.6* 5.3*   HEMATOCRIT % 34.7* 19.0*   PLATELETS 10*3/mm3 504* 527*         Results from last 7 days  Lab Units 03/01/18  0532   SODIUM mmol/L 139   POTASSIUM mmol/L 3.6   CHLORIDE mmol/L 98   CO2 mmol/L 27.9   BUN mg/dL 14   CREATININE mg/dL 0.87   CALCIUM mg/dL 8.4*   BILIRUBIN mg/dL 1.1   ALK PHOS U/L 79   ALT (SGPT) U/L 5   AST (SGOT) U/L 13   GLUCOSE mg/dL 84         Results from last 7 days  Lab Units 03/01/18  0532   INR  1.45*       RADIOLOGY:    Imaging  Results (last 72 hours)     Procedure Component Value Units Date/Time    XR Chest 1 View [[id number]] Collected:  02/28/18 1232     Updated:  02/28/18 1237    Narrative:       XR CHEST 1 VW-     HISTORY: Female who is 87 years-old,  pleural effusions     TECHNIQUE: Frontal view of the chest     COMPARISON: 9/13/2013     FINDINGS: Heart is enlarged. Pulmonary vasculature is congested, with  mild edema. Aorta is tortuous.. Bilateral basilar atelectasis/infiltrate  and pleural effusions. No pneumothorax. No acute osseous process.       Impression:       Bilateral basilar atelectasis/infiltrate and pleural  effusions. Cardiomegaly with pulmonary vascular congestion and mild  edema. Follow-up recommended.     This report was finalized on 2/28/2018 12:34 PM by Dr. Wilfredo Phillip MD.             Assessment/Plan     Patient Active Problem List   Diagnosis Code   • Anemia due to blood loss D50.0   • Lower GI bleed K92.2   • Atrial fibrillation I48.91   • Anemia due to blood loss, acute D62   • Essential hypertension, benign I10   • Hemorrhagic disorder due to extrinsic circulating anticoagulants D68.32   • Acute respiratory failure with hypoxia J96.01   • Acute on chronic diastolic congestive heart failure I50.33           Rebeca ALVAREZ Joseph, APRN  03/01/18  11:10 AM     We are following for GI bleeding    Constitutional: She is oriented to person, place, and time. She appears well-developed and well-nourished.   HENT: anicteric, no thyromegaly  Head: Normocephalic and atraumatic.   Eyes: Conjunctivae and EOM are normal.   Neck: Normal range of motion. No tracheal deviation present. Cardiovascular: Normal rate and regular rhythm.    Pulmonary/Chest: Effort normal and breath sounds normal. No respiratory distress.   Abdominal: Soft. Bowel sounds are normal. She exhibits no distension and no mass. There is no tenderness. There is no rebound and no guarding.   Musculoskeletal: Normal range of motion.   Neurological: She is  alert and oriented to person, place, and time.   Skin: Skin is warm and dry.   Psychiatric: She has a normal mood and affect. Judgment normal.   Nursing note and vitals reviewed.      Impression  1. GI Bleed- black stool and bright red blood reported while on Xarelto. Last dose of Xarelto was the AM of 2/27/18.  2.  Severe ABLA- Hgb stable after PRBCs  3.  ? Colitis November 2017- [facility]  4. Afib and DVT on Xarelto- last dose morning of 2/27  5. Hypoxia- CXR with edema/effusions 2/2 blood transfusions. CARDs following  6. Chronic constipation- on miralax  7. Recent nonbloody vomiting     Plan  Monitor H&H with transfusions PRN  She is in need of upper and lower endoscopic evaluation when stable from cardiopulmonary standpoint  Hold Xarelto. Will proceed with EGD/ CS when fluid overload resolved and patient is agreeable

## 2018-03-01 NOTE — PLAN OF CARE
Problem: Patient Care Overview (Adult)  Goal: Plan of Care Review  Outcome: Ongoing (interventions implemented as appropriate)   03/01/18 1554   Coping/Psychosocial Response Interventions   Plan Of Care Reviewed With patient   Patient Care Overview   Progress improving   Outcome Evaluation   Outcome Summary/Follow up Plan VSS. Hgb 10.6 today. No c/o pain. Up in chair. Chair alarm in place. Cardiac soft/bland diet. Plan for EGD/colonoscopy at some point. No BM today. Continue to monitor.       Problem: Pain, Acute (Adult)  Goal: Identify Related Risk Factors and Signs and Symptoms  Outcome: Ongoing (interventions implemented as appropriate)    Goal: Acceptable Pain Control/Comfort Level  Outcome: Ongoing (interventions implemented as appropriate)      Problem: Mobility, Physical Impaired (Adult)  Goal: Identify Related Risk Factors and Signs and Symptoms  Outcome: Ongoing (interventions implemented as appropriate)    Goal: Enhanced Mobility Skills  Outcome: Ongoing (interventions implemented as appropriate)    Goal: Enhanced Functionality Ability  Outcome: Ongoing (interventions implemented as appropriate)      Problem: Wound, Traumatic, Nonburn (Adult)  Goal: Signs and Symptoms of Listed Potential Problems Will be Absent or Manageable (Wound, Traumatic, Nonburn)  Outcome: Ongoing (interventions implemented as appropriate)      Problem: Pressure Ulcer Risk (Curtis Scale) (Adult,Obstetrics,Pediatric)  Goal: Identify Related Risk Factors and Signs and Symptoms  Outcome: Ongoing (interventions implemented as appropriate)    Goal: Skin Integrity  Outcome: Ongoing (interventions implemented as appropriate)

## 2018-03-02 LAB
ANION GAP SERPL CALCULATED.3IONS-SCNC: 11.9 MMOL/L
BUN BLD-MCNC: 18 MG/DL (ref 8–23)
BUN/CREAT SERPL: 17.3 (ref 7–25)
CALCIUM SPEC-SCNC: 8.3 MG/DL (ref 8.6–10.5)
CHLORIDE SERPL-SCNC: 99 MMOL/L (ref 98–107)
CO2 SERPL-SCNC: 30.1 MMOL/L (ref 22–29)
CREAT BLD-MCNC: 1.04 MG/DL (ref 0.57–1)
GFR SERPL CREATININE-BSD FRML MDRD: 50 ML/MIN/1.73
GLUCOSE BLD-MCNC: 103 MG/DL (ref 65–99)
HCT VFR BLD AUTO: 36.8 % (ref 35.6–45.5)
HEMOCCULT STL QL: NEGATIVE
HGB BLD-MCNC: 11 G/DL (ref 11.9–15.5)
MAGNESIUM SERPL-MCNC: 1.9 MG/DL (ref 1.6–2.4)
POTASSIUM BLD-SCNC: 3.7 MMOL/L (ref 3.5–5.2)
SODIUM BLD-SCNC: 141 MMOL/L (ref 136–145)
TSH SERPL DL<=0.05 MIU/L-ACNC: 3.17 MIU/ML (ref 0.27–4.2)

## 2018-03-02 PROCEDURE — 99232 SBSQ HOSP IP/OBS MODERATE 35: CPT | Performed by: INTERNAL MEDICINE

## 2018-03-02 PROCEDURE — 85018 HEMOGLOBIN: CPT | Performed by: NURSE PRACTITIONER

## 2018-03-02 PROCEDURE — 99231 SBSQ HOSP IP/OBS SF/LOW 25: CPT | Performed by: INTERNAL MEDICINE

## 2018-03-02 PROCEDURE — 82272 OCCULT BLD FECES 1-3 TESTS: CPT | Performed by: HOSPITALIST

## 2018-03-02 PROCEDURE — 83735 ASSAY OF MAGNESIUM: CPT | Performed by: NURSE PRACTITIONER

## 2018-03-02 PROCEDURE — 80048 BASIC METABOLIC PNL TOTAL CA: CPT | Performed by: NURSE PRACTITIONER

## 2018-03-02 PROCEDURE — 85014 HEMATOCRIT: CPT | Performed by: NURSE PRACTITIONER

## 2018-03-02 RX ORDER — MAGNESIUM SULFATE HEPTAHYDRATE 40 MG/ML
2 INJECTION, SOLUTION INTRAVENOUS AS NEEDED
Status: DISCONTINUED | OUTPATIENT
Start: 2018-03-02 | End: 2018-03-03 | Stop reason: HOSPADM

## 2018-03-02 RX ORDER — POTASSIUM CHLORIDE 750 MG/1
40 CAPSULE, EXTENDED RELEASE ORAL AS NEEDED
Status: DISCONTINUED | OUTPATIENT
Start: 2018-03-02 | End: 2018-03-03 | Stop reason: HOSPADM

## 2018-03-02 RX ORDER — POTASSIUM CHLORIDE 1.5 G/1.77G
40 POWDER, FOR SOLUTION ORAL AS NEEDED
Status: DISCONTINUED | OUTPATIENT
Start: 2018-03-02 | End: 2018-03-03 | Stop reason: HOSPADM

## 2018-03-02 RX ORDER — POLYETHYLENE GLYCOL 3350, SODIUM CHLORIDE, POTASSIUM CHLORIDE, SODIUM BICARBONATE, AND SODIUM SULFATE 240; 5.84; 2.98; 6.72; 22.72 G/4L; G/4L; G/4L; G/4L; G/4L
2000 POWDER, FOR SOLUTION ORAL 2 TIMES DAILY
Status: COMPLETED | OUTPATIENT
Start: 2018-03-02 | End: 2018-03-03

## 2018-03-02 RX ORDER — MAGNESIUM SULFATE 1 G/100ML
1 INJECTION INTRAVENOUS AS NEEDED
Status: DISCONTINUED | OUTPATIENT
Start: 2018-03-02 | End: 2018-03-03 | Stop reason: HOSPADM

## 2018-03-02 RX ORDER — POTASSIUM CHLORIDE 7.45 MG/ML
10 INJECTION INTRAVENOUS
Status: DISCONTINUED | OUTPATIENT
Start: 2018-03-02 | End: 2018-03-03 | Stop reason: HOSPADM

## 2018-03-02 RX ADMIN — GABAPENTIN 100 MG: 100 CAPSULE ORAL at 08:27

## 2018-03-02 RX ADMIN — POLYETHYLENE GLYCOL (3350) 17 G: 17 POWDER, FOR SOLUTION ORAL at 08:26

## 2018-03-02 RX ADMIN — POLYETHYLENE GLYCOL 3350, SODIUM CHLORIDE, POTASSIUM CHLORIDE, SODIUM BICARBONATE, AND SODIUM SULFATE 2000 ML: 240; 5.84; 2.98; 6.72; 22.72 POWDER, FOR SOLUTION ORAL at 15:16

## 2018-03-02 RX ADMIN — TRAMADOL HYDROCHLORIDE 50 MG: 50 TABLET, FILM COATED ORAL at 20:48

## 2018-03-02 RX ADMIN — TRAMADOL HYDROCHLORIDE 50 MG: 50 TABLET, FILM COATED ORAL at 13:30

## 2018-03-02 RX ADMIN — MIRTAZAPINE 15 MG: 15 TABLET, FILM COATED ORAL at 20:46

## 2018-03-02 RX ADMIN — AMLODIPINE BESYLATE 5 MG: 5 TABLET ORAL at 08:27

## 2018-03-02 RX ADMIN — AMIODARONE HYDROCHLORIDE 100 MG: 200 TABLET ORAL at 08:26

## 2018-03-02 RX ADMIN — METOPROLOL TARTRATE 12.5 MG: 25 TABLET ORAL at 08:27

## 2018-03-02 RX ADMIN — METOPROLOL TARTRATE 12.5 MG: 25 TABLET ORAL at 20:45

## 2018-03-02 RX ADMIN — PANTOPRAZOLE SODIUM 40 MG: 40 TABLET, DELAYED RELEASE ORAL at 06:00

## 2018-03-02 RX ADMIN — AMIODARONE HYDROCHLORIDE 100 MG: 200 TABLET ORAL at 20:46

## 2018-03-02 RX ADMIN — GABAPENTIN 100 MG: 100 CAPSULE ORAL at 20:46

## 2018-03-02 NOTE — PROGRESS NOTES
"Adult Nutrition  Assessment/PES    Patient Name:  Aria Fernando  YOB: 1930  MRN: [id number]  Admit Date:  2/28/2018    Assessment Date:  3/2/2018    Comments:  Follow up.  100% x 1 meal last night of regular diet.  Reports good appetite.  Now switched to clear liquid diet, EGD planned for tomorrow.      Patient refuses oral nutrition supplements of Ensure/Boost, but agrees to Stefan BID to promote wound healing.  Will add orders.    Will continue to follow.          Reason for Assessment       03/02/18 1403    Reason for Assessment    Reason For Assessment/Visit follow up protocol              Nutrition/Diet History       03/02/18 1403    Nutrition/Diet History    Typical Food/Fluid Intake patient reports good appetite, does not like or want supplements, but agrees to Stefan            Anthropometrics       03/02/18 1404    Anthropometrics    Height 162.6 cm (64.02\")    Weight 64.7 kg (142 lb 10.2 oz)    RD Documented Current Weight  64.7 kg (142 lb 10.2 oz)    Ideal Body Weight (IBW)    Ideal Body Weight (IBW), Female 55.44    % Ideal Body Weight 116.95    Usual Body Weight (UBW)    Usual Body Weight 66.7 kg (147 lb)    % Usual Body Weight 97.03    Weight Loss Time Frame reports maybe a couple pounds of weight loss    Body Mass Index (BMI)    BMI (kg/m2) 24.52    BMI Grade 19.1 - 24.9 - normal            Labs/Tests/Procedures/Meds       03/02/18 1405    Labs/Tests/Procedures/Meds    Diagnostic Test/Procedure Review reviewed, pertinent    Labs/Tests Review Reviewed;Alb;Hgb Hct    Procedure Review Other (comment)   plans for EGD tomorrow    Medication Review Reviewed, pertinent;Antacid;Laxative   remeron    Significant Vitals reviewed, pertinent            Physical Findings       03/02/18 1406    Physical Findings/Assessment    Additional Documentation Physical Appearance (Group)    Physical Appearance    Skin pressure ulcer(s)   L heel unstagable, B=17              Nutrition Prescription Ordered      "  03/02/18 1406    Nutrition Prescription PO    Current PO Diet Clear Liquid            Evaluation of Received Nutrient/Fluid Intake       03/02/18 1406    PO Evaluation    Number of Meals 1    % PO Intake 100   dinner last night of regular diet            Problem/Interventions:          Problem 2       03/02/18 1406    Nutrition Diagnoses Problem 2    Problem 2 Increased Nutrient Needs    Macronutrient Kcal;Protein    Etiology (related to) Medical Diagnosis    Skin Pressure ulcer    Signs/Symptoms (evidenced by) Report/Observation                  Intervention Goal       03/02/18 1407    Intervention Goal    General Maintain nutrition;Disease management/therapy;Reduce/improve symptoms;Meet nutritional needs for age/condition    PO Advance diet;Tolerate PO;PO intake (%)    PO Intake % 75 %    Weight No significant weight loss            Nutrition Intervention       03/02/18 1407    Nutrition Intervention    RD/Tech Action Follow Tx progress;Care plan reviewd;Encourage intake;Recommend/ordered    Recommended/Ordered Supplement            Nutrition Prescription       03/02/18 1407    Nutrition Prescription PO    PO Prescription Begin/change supplement    Supplement Stefan    Supplement Frequency 2 times a day    New PO Prescription Ordered? Yes            Education/Evaluation       03/02/18 1407    Education    Education Will Instruct as appropriate    Monitor/Evaluation    Monitor Per protocol;I&O;Weight;Skin status        Electronically signed by:  Caren Arce RD  03/02/18 2:08 PM

## 2018-03-02 NOTE — PLAN OF CARE
Problem: Patient Care Overview (Adult)  Goal: Plan of Care Review  Outcome: Ongoing (interventions implemented as appropriate)   03/02/18 0457   Coping/Psychosocial Response Interventions   Plan Of Care Reviewed With patient   Patient Care Overview   Progress no change   Outcome Evaluation   Outcome Summary/Follow up Plan RESTED WELL THRU THE NIGHT, VSS. O2 SATS 96% ON 2LPM/NC. RESP E/U. LUNG SOUNDS CLEAR ,BUT DIMINISHED. NO BM THIS SHIFT. NO C/O VOICED.     Goal: Adult Individualization and Mutuality  Outcome: Ongoing (interventions implemented as appropriate)    Goal: Discharge Needs Assessment  Outcome: Ongoing (interventions implemented as appropriate)      Problem: Pain, Acute (Adult)  Goal: Identify Related Risk Factors and Signs and Symptoms  Outcome: Ongoing (interventions implemented as appropriate)    Goal: Acceptable Pain Control/Comfort Level  Outcome: Ongoing (interventions implemented as appropriate)      Problem: Mobility, Physical Impaired (Adult)  Goal: Identify Related Risk Factors and Signs and Symptoms  Outcome: Ongoing (interventions implemented as appropriate)    Goal: Enhanced Mobility Skills  Outcome: Ongoing (interventions implemented as appropriate)    Goal: Enhanced Functionality Ability  Outcome: Ongoing (interventions implemented as appropriate)      Problem: Wound, Traumatic, Nonburn (Adult)  Goal: Signs and Symptoms of Listed Potential Problems Will be Absent or Manageable (Wound, Traumatic, Nonburn)  Outcome: Ongoing (interventions implemented as appropriate)      Problem: Pressure Ulcer Risk (Curtis Scale) (Adult,Obstetrics,Pediatric)  Goal: Identify Related Risk Factors and Signs and Symptoms  Outcome: Ongoing (interventions implemented as appropriate)    Goal: Skin Integrity  Outcome: Ongoing (interventions implemented as appropriate)

## 2018-03-02 NOTE — PROGRESS NOTES
BGA/GI Progress Note   Chief Complaint: vomiting, melena and rectal bleeding    Subjective     Interval History:   No BM since admission. No GI complains. SOB improved    History taken from: patient chart RN    Review of Systems:    The following systems were reviewed and negative;  gastrointestinal  See HPI  Objective     Vital Signs  Temp:  [97.1 °F (36.2 °C)-98.8 °F (37.1 °C)] 97.1 °F (36.2 °C)  Heart Rate:  [64-75] 64  Resp:  [19-20] 20  BP: (111-132)/(61-70) 132/70  Body mass index is 24.47 kg/(m^2).    Intake/Output Summary (Last 24 hours) at 03/02/18 0824  Last data filed at 03/01/18 2027   Gross per 24 hour   Intake              120 ml   Output              300 ml   Net             -180 ml          Physical Exam:   General: patient awake, alert and cooperative, on [place] NC 2 L at 96%   Eyes: Normal lids and lashes, no scleral icterus, no conjunctival pallor   Neck: supple, normal ROM, no tracheal deviation, no thyromegaly   Skin: warm and dry, not jaundiced   Cardiovascular: regular rhythm and rate, no murmurs auscultated   Pulm: clear to auscultation bilaterally,decreased BS bilaterally    Abdomen: soft, nontender, nondistended; normal bowel sounds   Rectal: deferred   Extremities: no rash or edema   Neurologic: Normal mood and behavior    All Medications Have Been Reviewed     Results Review:       Results from last 7 days  Lab Units 03/01/18  0532 02/28/18  0715   WBC 10*3/mm3 11.79* 10.04   HEMOGLOBIN g/dL 10.6* 5.3*   HEMATOCRIT % 34.7* 19.0*   PLATELETS 10*3/mm3 504* 527*         Results from last 7 days  Lab Units 03/01/18  0532   SODIUM mmol/L 139   POTASSIUM mmol/L 3.6   CHLORIDE mmol/L 98   CO2 mmol/L 27.9   BUN mg/dL 14   CREATININE mg/dL 0.87   CALCIUM mg/dL 8.4*   BILIRUBIN mg/dL 1.1   ALK PHOS U/L 79   ALT (SGPT) U/L 5   AST (SGOT) U/L 13   GLUCOSE mg/dL 84         Results from last 7 days  Lab Units 03/01/18  0532   INR  1.45*       RADIOLOGY:    Imaging Results (last 72 hours)      Procedure Component Value Units Date/Time    XR Chest 1 View [[id number]] Collected:  02/28/18 1232     Updated:  02/28/18 1237    Narrative:       XR CHEST 1 VW-     HISTORY: Female who is 87 years-old,  pleural effusions     TECHNIQUE: Frontal view of the chest     COMPARISON: 9/13/2013     FINDINGS: Heart is enlarged. Pulmonary vasculature is congested, with  mild edema. Aorta is tortuous.. Bilateral basilar atelectasis/infiltrate  and pleural effusions. No pneumothorax. No acute osseous process.       Impression:       Bilateral basilar atelectasis/infiltrate and pleural  effusions. Cardiomegaly with pulmonary vascular congestion and mild  edema. Follow-up recommended.     This report was finalized on 2/28/2018 12:34 PM by Dr. Wilfredo Phillip MD.             Assessment/Plan     Patient Active Problem List   Diagnosis Code   • Anemia due to blood loss D50.0   • Lower GI bleed K92.2   • Atrial fibrillation I48.91   • Anemia due to blood loss, acute D62   • Essential hypertension, benign I10   • Hemorrhagic disorder due to extrinsic circulating anticoagulants D68.32   • Acute respiratory failure with hypoxia J96.01   • Acute on chronic diastolic congestive heart failure I50.33           Rebeca ALVAREZ Joseph, APRN  03/02/18  8:24 AM       We are following for GI bleeding    Constitutional: She is oriented to person, place, and time. She appears well-developed and well-nourished.   HENT: anicteric, no thyromegaly  Head: Normocephalic and atraumatic.   Eyes: Conjunctivae and EOM are normal.   Neck: Normal range of motion. No tracheal deviation present. Cardiovascular: Normal rate and regular rhythm.    Pulmonary/Chest: Effort normal and breath sounds normal. No respiratory distress.   Abdominal: Soft. Bowel sounds are normal. She exhibits no distension and no mass. There is no tenderness. There is no rebound and no guarding.   Musculoskeletal: Normal range of motion.   Neurological: She is alert and oriented to  person, place, and time.   Skin: Skin is warm and dry.   Psychiatric: She has a normal mood and affect. Judgment normal.   Nursing note and vitals reviewed.      Impression  1. GI Bleed- black stool and bright red blood reported while on Xarelto. Last dose of Xarelto was the AM of 2/27/18.  2.  Severe ABLA- Hgb stable after PRBCs  3.  ? Colitis November 2017- [facility]  4. Afib and DVT on Xarelto- last dose morning of 2/27  5. Hypoxia- CXR with edema/effusions 2/2 blood transfusions. CARDs following  6. Chronic constipation- on miralax  7. Recent nonbloody vomiting     Plan  Monitor H&H with transfusions PRN. Repeat H&H pending today  She is in need of upper and lower endoscopic evaluation when stable from cardiopulmonary standpoint  Hold Xarelto. Will proceed with EGD/ CS when fluid overload resolved and patient is agreeable        Patient has discussed her case with Dr. Espinoza, I have discussed this case with my nurse practitioner, the patient is agreeable to scopes tomorrow.  We will prep

## 2018-03-02 NOTE — PLAN OF CARE
Problem: Pressure Ulcer Risk (Curtis Scale) (Adult,Obstetrics,Pediatric)  Intervention: Promote/Optimize Nutrition  Added Stefan BID to promote wound healing.

## 2018-03-02 NOTE — PROGRESS NOTES
Name: Aria Fernando ADMIT: 2018   : 1930  PCP: Luci Ingram MD    MRN: [id number] LOS: 2 days   AGE/SEX: 87 y.o. female    ROOM: [facility]   Subjective   Patient is sitting up in a chair on 2 L of oxygen   She is ready for colonoscopy and EGD    Brief hospital course since admission:  GI bleed  Acute anemia secondary to GI bleed  History of atrial fibrillation and history of DVT, was on Xarelto to which is been on hold    I have reviewed past medical history, social hisotory, family history, allergies.  No changes from admission note.      Review of Systems   No chest pain  No shortness of breath  No more rectal bleeding  No BM    Objective   Vital Signs  Temp:  [97.1 °F (36.2 °C)-98.8 °F (37.1 °C)] 97.1 °F (36.2 °C)  Heart Rate:  [64-75] 64  Resp:  [19-20] 20  BP: (111-132)/(61-70) 132/70  SpO2:  [94 %-95 %] 95 %  on  Flow (L/min):  [2] 2;   O2 Device: nasal cannula with humidification  Body mass index is 24.47 kg/(m^2).    Intake/Output Summary (Last 24 hours) at 18 1128  Last data filed at 18   Gross per 24 hour   Intake              120 ml   Output              300 ml   Net             -180 ml       Physical Exam   Constitutional: She is oriented to person, place, and time. She appears well-developed and well-nourished. No distress. Nasal cannula in place.   HENT:   Head: Normocephalic and atraumatic.   Eyes: Pupils are equal, round, and reactive to light. No scleral icterus.   Cardiovascular: Normal rate.  An irregular rhythm present.   Pulmonary/Chest: Effort normal. No respiratory distress. She has no decreased breath sounds. She has no wheezes.   Abdominal: Soft. Bowel sounds are normal. There is no hepatosplenomegaly.   Neurological: She is alert and oriented to person, place, and time.   Skin: No cyanosis. Nails show no clubbing.   Psychiatric: She has a normal mood and affect. Her behavior is normal.       Results Review:      Results from last 7 days  Lab Units  03/01/18  0532 02/28/18  0715   WBC 10*3/mm3 11.79* 10.04   HEMOGLOBIN g/dL 10.6* 5.3*   HEMATOCRIT % 34.7* 19.0*   PLATELETS 10*3/mm3 504* 527*       Results from last 7 days  Lab Units 03/01/18  0532   SODIUM mmol/L 139   POTASSIUM mmol/L 3.6   CHLORIDE mmol/L 98   CO2 mmol/L 27.9   BUN mg/dL 14   CREATININE mg/dL 0.87   GLUCOSE mg/dL 84   CALCIUM mg/dL 8.4*       Results from last 7 days  Lab Units 03/01/18  0532   INR  1.45*     Hemoglobin A1C:No results found for: HGBA1C  Glucose Range:No results found for: POCGLU      amiodarone 100 mg Oral BID   amLODIPine 5 mg Oral Daily   gabapentin 100 mg Oral BID   metoprolol tartrate 12.5 mg Oral Q12H   mirtazapine 15 mg Oral Nightly   pantoprazole 40 mg Oral QAM   polyethylene glycol 17 g Oral Daily      Diet Regular; Cardiac, GI Soft / [place]      Assessment/Plan   Active Hospital Problems (** Indicates Principal Problem)    Diagnosis Date Noted   • **Lower GI bleed [K92.2] 02/28/2018   • Hemorrhagic disorder due to extrinsic circulating anticoagulants [D68.32] 03/01/2018   • Acute respiratory failure with hypoxia [J96.01] 03/01/2018   • Acute on chronic diastolic congestive heart failure [I50.33] 03/01/2018   • Atrial fibrillation [I48.91] 02/28/2018   • Anemia due to blood loss, acute [D62] 02/28/2018   • Essential hypertension, benign [I10] 02/28/2018      Resolved Hospital Problems    Diagnosis Date Noted Date Resolved   No resolved problems to display.     Patient is being transfused and her hemoglobin stable.  She is off Xarelto  EGD and colonoscopy per GI  Hypoxia, improved    She is ready for EGD and colonoscopy       Joni Espinoza MD  [facility]  03/02/18

## 2018-03-02 NOTE — PROGRESS NOTES
Continued Stay Note  [facility]     Patient Name: Aria Fernando  MRN: [id number]  Today's Date: 3/2/2018    Admit Date: 2/28/2018          Discharge Plan       03/02/18 1229    Case Management/Social Work Plan    Plan Plan home with [facility].   NESSA Crowder    Additional Comments Spoke to pt at bedside.  Pt states plan continues to be return home with [facility].   States family talked with Marjan with [facility] on 3/1.  Plan home with [facility].  NESSA Crowder              Discharge Codes     None            Kyara Stanton RN

## 2018-03-02 NOTE — PROGRESS NOTES
[facility]  Cardiology Progress Note    Patient Identification:  Name: Aria Fernando  Age: 87 y.o.  Sex: female  :  1930  MRN: [id number]                 Follow Up / Chief Complaint: AFIB ON XARELTO WITH ACUTE GIB    Interval History: 87-year-old  with a h/o chronic atrial fibrillation,  hyperlipidemia,  and hypertension.  Patient was transferred from [facility] with a hemoglobin of 4.1 after having a history of weakness and passing bright red blood per rectum for almost a week. She had previous history of bleeding while on Coumadin per EMR      Subjective:   Denies chest pain, tightness, palpitations or dizziness.  Denies nausea, abdominal pain or recurrent bleeding.  Denies cough or hemoptysis.    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 No pertinent family history.       Allergies:  Allergies   Allergen Reactions   • Clinoril [Sulindac] Itching   • Codeine Itching   • Coumadin [Warfarin Sodium] GI Bleeding   • Ibuprofen-Oxycodone [Oxycodone-Ibuprofen] Itching   • Keflex [Cephalexin] Itching   • Mydriacyl  [Tropicamide] Angioedema   • Penicillins Angioedema   • Phenylephrine Hcl Angioedema   • Sulfa Antibiotics Angioedema   • Zantac [Ranitidine Hcl] Nausea And Vomiting     Scheduled Meds:    amiodarone 100 mg BID   amLODIPine 5 mg Daily   gabapentin 100 mg BID   metoprolol tartrate 12.5 mg Q12H   mirtazapine 15 mg Nightly   pantoprazole 40 mg QAM   polyethylene glycol 17 g Daily           INTAKE AND OUTPUT:    Intake/Output Summary (Last 24 hours) at 03/02/18 1237  Last data filed at 03/01/18 2027   Gross per 24 hour   Intake              120 ml   Output              300 ml   Net             -180 ml       Review of Systems:   GI: no  n/v  Cardiac: no cp or palpitations  Pulmonary: No cough or hemoptysis    Constitutional:  Temp:  [97.1 °F (36.2 °C)-98.8 °F (37.1 °C)] 97.1 °F (36.2 °C)  Heart Rate:  [64-75] 64  Resp:  [19-20] 20  BP: (111-132)/(61-70) 132/70    Physical Exam:  General:  Appears frail, but in no acute distress  Eyes: PERTL,  HEENT:  No JVD. Thyroid not visibly enlarged. No mucosal cyanosis  Respiratory: Respirations regular and unlabored at rest. BBS with good air entry in all fields. No crackles or wheezes auscultated  Cardiovascular: S1S2 Regular rate and rhythm. No murmur   No pretibial pitting edema  Gastrointestinal: Abdomen soft, flat, non tender. Bowel sounds present.   Musculoskeletal: NUNES x4. No abnormal movements  Extremities: No digital clubbing or cyanosis  Skin: Color pink. Skin warm and dry to touch. No     Neuro: AAO x3 CN II-XII grossly intact  Psych: Mood and affect normal, pleasant and cooperative      Cardiographics  Telemetry: currently       3-2-18 @0842:      Echocardiogram:   3/1/18     Interpretation Summary      · Left atrial cavity size is mildly dilated.  · There is calcification of the aortic valve.  · Mild-to-moderate mitral valve regurgitation is present  · Mild tricuspid valve regurgitation is present.  · Left Ventricle: Calculated EF = 62.9%.  · There is no evidence of  pericardial effusion         Lab Review   Results from last 7 days  Lab Units 03/01/18  0532   SODIUM mmol/L 139   POTASSIUM mmol/L 3.6   BUN mg/dL 14   CREATININE mg/dL 0.87   CALCIUM mg/dL 8.4*     Results from last 7 days  Lab Units 03/01/18  0532 02/28/18  0715   WBC 10*3/mm3 11.79* 10.04   HEMOGLOBIN g/dL 10.6* 5.3*   HEMATOCRIT % 34.7* 19.0*   PLATELETS 10*3/mm3 504* 527*     Results from last 7 days  Lab Units 03/01/18  0532 02/28/18  0715   INR  1.45*  --    APTT seconds  --  55.3*     Venous doppler 2/28/18     Interpretation Summary      · Normal bilateral lower extremity venous duplex scan.                 Active Hospital Problems (** Indicates Principal Problem)      Diagnosis Date Noted   • Anemia due to blood loss [D50.0] 02/28/2018   • Lower GI bleed [K92.2] 02/28/2018   • Atrial fibrillation [I48.91] 02/28/2018   • Anemia due to blood loss, acute [D62] 02/28/2018         Assessment/Plan:        - Hx of AFib: - currently SR, but had an episode of PAF RVR this morning.. On amiodarone and low-dose beta blocker.      - elevated [id number] - Remains off Xarelto 20 mg d/t acute GIB.       -  HTN:     Controlled on low-dose beta blocker and Norvasc    - EF 60-65% - No clinical evidence of fluid overload     No clinical evidence of fluid overload.  No angina.  Blood pressure controlled.  Cleared to proceed with endoscopic studies.  Needs to receive scheduled Amiodarone and lopressor with sip water on morning of GI studies.  Resume Xarelto once cleared by GI..  Check electrolytes and replace if needed.  Check TSH    While patient is taking Amiodarone, yearly eye exams, thyroid function studies, liver function studies and chest x-ray are recommended            -Labs/tests ordered for am: TSH, BMP, Mag      I reviewed the patient's new clinical results and treatment plan  I personally viewed and interpreted the patient's EKG/Telemetry data    )3/2/2018  Chaz Nelson MD      EMR Dragon/Transcription:  "  \"Dictated utilizing Dragon dictation\".   "

## 2018-03-03 ENCOUNTER — ANESTHESIA (OUTPATIENT)
Dept: GASTROENTEROLOGY | Facility: HOSPITAL | Age: 83
End: 2018-03-03

## 2018-03-03 ENCOUNTER — ANESTHESIA EVENT (OUTPATIENT)
Dept: GASTROENTEROLOGY | Facility: HOSPITAL | Age: 83
End: 2018-03-03

## 2018-03-03 VITALS
BODY MASS INDEX: 23.44 KG/M2 | HEIGHT: 64 IN | OXYGEN SATURATION: 96 % | WEIGHT: 137.3 LBS | RESPIRATION RATE: 14 BRPM | TEMPERATURE: 97.8 F | DIASTOLIC BLOOD PRESSURE: 67 MMHG | SYSTOLIC BLOOD PRESSURE: 130 MMHG | HEART RATE: 70 BPM

## 2018-03-03 PROBLEM — J96.01 ACUTE RESPIRATORY FAILURE WITH HYPOXIA: Status: RESOLVED | Noted: 2018-03-01 | Resolved: 2018-03-03

## 2018-03-03 LAB
HCT VFR BLD AUTO: 40.1 % (ref 35.6–45.5)
HGB BLD-MCNC: 12 G/DL (ref 11.9–15.5)

## 2018-03-03 PROCEDURE — 88305 TISSUE EXAM BY PATHOLOGIST: CPT | Performed by: INTERNAL MEDICINE

## 2018-03-03 PROCEDURE — 0D5H8ZZ DESTRUCTION OF CECUM, VIA NATURAL OR ARTIFICIAL OPENING ENDOSCOPIC: ICD-10-PCS | Performed by: INTERNAL MEDICINE

## 2018-03-03 PROCEDURE — 43239 EGD BIOPSY SINGLE/MULTIPLE: CPT | Performed by: INTERNAL MEDICINE

## 2018-03-03 PROCEDURE — 0DB98ZX EXCISION OF DUODENUM, VIA NATURAL OR ARTIFICIAL OPENING ENDOSCOPIC, DIAGNOSTIC: ICD-10-PCS | Performed by: INTERNAL MEDICINE

## 2018-03-03 PROCEDURE — 0D5K8ZZ DESTRUCTION OF ASCENDING COLON, VIA NATURAL OR ARTIFICIAL OPENING ENDOSCOPIC: ICD-10-PCS | Performed by: INTERNAL MEDICINE

## 2018-03-03 PROCEDURE — 45380 COLONOSCOPY AND BIOPSY: CPT | Performed by: INTERNAL MEDICINE

## 2018-03-03 PROCEDURE — 25010000002 PROPOFOL 10 MG/ML EMULSION: Performed by: NURSE ANESTHETIST, CERTIFIED REGISTERED

## 2018-03-03 PROCEDURE — 88312 SPECIAL STAINS GROUP 1: CPT | Performed by: INTERNAL MEDICINE

## 2018-03-03 PROCEDURE — 85014 HEMATOCRIT: CPT | Performed by: NURSE PRACTITIONER

## 2018-03-03 PROCEDURE — 85018 HEMOGLOBIN: CPT | Performed by: NURSE PRACTITIONER

## 2018-03-03 PROCEDURE — 99232 SBSQ HOSP IP/OBS MODERATE 35: CPT | Performed by: INTERNAL MEDICINE

## 2018-03-03 PROCEDURE — 0DB68ZX EXCISION OF STOMACH, VIA NATURAL OR ARTIFICIAL OPENING ENDOSCOPIC, DIAGNOSTIC: ICD-10-PCS | Performed by: INTERNAL MEDICINE

## 2018-03-03 PROCEDURE — 0DBN8ZX EXCISION OF SIGMOID COLON, VIA NATURAL OR ARTIFICIAL OPENING ENDOSCOPIC, DIAGNOSTIC: ICD-10-PCS | Performed by: INTERNAL MEDICINE

## 2018-03-03 PROCEDURE — 0DBP8ZX EXCISION OF RECTUM, VIA NATURAL OR ARTIFICIAL OPENING ENDOSCOPIC, DIAGNOSTIC: ICD-10-PCS | Performed by: INTERNAL MEDICINE

## 2018-03-03 PROCEDURE — 45388 COLONOSCOPY W/ABLATION: CPT | Performed by: INTERNAL MEDICINE

## 2018-03-03 RX ORDER — LIDOCAINE HYDROCHLORIDE 20 MG/ML
INJECTION, SOLUTION INFILTRATION; PERINEURAL AS NEEDED
Status: DISCONTINUED | OUTPATIENT
Start: 2018-03-03 | End: 2018-03-03 | Stop reason: SURG

## 2018-03-03 RX ORDER — PROPOFOL 10 MG/ML
VIAL (ML) INTRAVENOUS CONTINUOUS PRN
Status: DISCONTINUED | OUTPATIENT
Start: 2018-03-03 | End: 2018-03-03 | Stop reason: SURG

## 2018-03-03 RX ORDER — PROPOFOL 10 MG/ML
VIAL (ML) INTRAVENOUS AS NEEDED
Status: DISCONTINUED | OUTPATIENT
Start: 2018-03-03 | End: 2018-03-03 | Stop reason: SURG

## 2018-03-03 RX ORDER — SODIUM CHLORIDE 0.9 % (FLUSH) 0.9 %
3 SYRINGE (ML) INJECTION AS NEEDED
Status: DISCONTINUED | OUTPATIENT
Start: 2018-03-03 | End: 2018-03-03

## 2018-03-03 RX ORDER — SODIUM CHLORIDE, SODIUM LACTATE, POTASSIUM CHLORIDE, CALCIUM CHLORIDE 600; 310; 30; 20 MG/100ML; MG/100ML; MG/100ML; MG/100ML
1000 INJECTION, SOLUTION INTRAVENOUS CONTINUOUS PRN
Status: DISCONTINUED | OUTPATIENT
Start: 2018-03-03 | End: 2018-03-03 | Stop reason: HOSPADM

## 2018-03-03 RX ADMIN — EPHEDRINE SULFATE 10 MG: 50 INJECTION INTRAMUSCULAR; INTRAVENOUS; SUBCUTANEOUS at 10:27

## 2018-03-03 RX ADMIN — EPHEDRINE SULFATE 10 MG: 50 INJECTION INTRAMUSCULAR; INTRAVENOUS; SUBCUTANEOUS at 10:20

## 2018-03-03 RX ADMIN — PROPOFOL 30 MG: 10 INJECTION, EMULSION INTRAVENOUS at 09:42

## 2018-03-03 RX ADMIN — POLYETHYLENE GLYCOL 3350, SODIUM CHLORIDE, POTASSIUM CHLORIDE, SODIUM BICARBONATE, AND SODIUM SULFATE 2000 ML: 240; 5.84; 2.98; 6.72; 22.72 POWDER, FOR SOLUTION ORAL at 04:02

## 2018-03-03 RX ADMIN — GABAPENTIN 100 MG: 100 CAPSULE ORAL at 11:28

## 2018-03-03 RX ADMIN — SODIUM CHLORIDE, POTASSIUM CHLORIDE, SODIUM LACTATE AND CALCIUM CHLORIDE 1000 ML: 600; 310; 30; 20 INJECTION, SOLUTION INTRAVENOUS at 08:50

## 2018-03-03 RX ADMIN — PANTOPRAZOLE SODIUM 40 MG: 40 TABLET, DELAYED RELEASE ORAL at 06:22

## 2018-03-03 RX ADMIN — PROPOFOL 100 MCG/KG/MIN: 10 INJECTION, EMULSION INTRAVENOUS at 09:42

## 2018-03-03 RX ADMIN — TRAMADOL HYDROCHLORIDE 50 MG: 50 TABLET, FILM COATED ORAL at 03:12

## 2018-03-03 RX ADMIN — EPHEDRINE SULFATE 10 MG: 50 INJECTION INTRAMUSCULAR; INTRAVENOUS; SUBCUTANEOUS at 10:32

## 2018-03-03 RX ADMIN — METOPROLOL TARTRATE 12.5 MG: 25 TABLET ORAL at 08:03

## 2018-03-03 RX ADMIN — LIDOCAINE HYDROCHLORIDE 60 MG: 20 INJECTION, SOLUTION INFILTRATION; PERINEURAL at 09:42

## 2018-03-03 RX ADMIN — AMLODIPINE BESYLATE 5 MG: 5 TABLET ORAL at 11:28

## 2018-03-03 RX ADMIN — AMIODARONE HYDROCHLORIDE 100 MG: 200 TABLET ORAL at 08:03

## 2018-03-03 NOTE — ANESTHESIA PREPROCEDURE EVALUATION
Anesthesia Evaluation     Patient summary reviewed and Nursing notes reviewed   NPO Solid Status: > 6 hours  NPO Liquid Status: > 6 hours           Airway   Mallampati: I  TM distance: <3 FB  Neck ROM: full  no difficulty expected  Dental - normal exam   (+) upper dentures and lower dentures    Pulmonary    (+) pleural effusion, decreased breath sounds,     ROS comment: CXray 2/28 shows atelectasis, pleural effusion, cardiomegaly  Cardiovascular - normal exam  Exercise tolerance: poor (<4 METS)    Rhythm: irregular    (+) hypertension, CAD, dysrhythmias Atrial Fib, CHF, PVD, DVT, hyperlipidemia,       Neuro/Psych- negative ROS  GI/Hepatic/Renal/Endo    (+)  GERD, GI bleeding,     Musculoskeletal     Abdominal  - normal exam    Bowel sounds: normal.   Substance History - negative use     OB/GYN negative ob/gyn ROS         Other   (+) arthritis                   Anesthesia Plan    ASA 3     MAC     Anesthetic plan and risks discussed with patient.

## 2018-03-03 NOTE — H&P (VIEW-ONLY)
[facility]  Initial Inpatient Consult Note    Referring Provider: Dr. Espinzoa    Reason for Consultation: vomiting, melena and rectal bleeding    Subjective     History of present illness:    87 y.o. female  transferred from [facility] today with a hemoglobin of 4.1.  A CT of the abdomen pelvis was performed at [facility] report not currently available. GI has been consulted for reports of bright red blood per rectum ×1 week.  The patient is a poor historian and daughter-in-law at bedside  assists with information.  She was hospitalized in [place] November 2017 with colitis and again December 2018 for DVT and A. fib and was started on Xarelto.  Her hemoglobin upon discharge in December was around 14.  Patient reports an episode of grossly bloody stools in a 2018 while residing in a nursing home but the event was not reported.  Since that episode her stools have been very black in appearance but she denies further bright red blood per rectum. .She denies abdominal pain, nausea, vomiting, or hematemesis.    According to the daughter-in-law, she was treated at [facility] in 2013 GI bleeding from a colonic source while on coumadin but I do not see record if this event. She did have a colonoscopy and EGD by Dr Armstrong in 2013 with gastritis and a benign colon polyp.     Her last dose of aspirin and Xarelto was yesterday morning. She is chronically constipated and has not had a bowel movement 3 days.    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 No pertinent family history.    Home Meds:  Prescriptions Prior to Admission   Medication Sig Dispense Refill Last Dose   • acetaminophen (TYLENOL) 500 MG tablet Take 1,000 mg by mouth Every 4 (Four) Hours As Needed for Mild Pain , Headache or Fever.   Past Week at Unknown time   • alendronate (FOSAMAX) 70 MG tablet Take 70 mg by mouth Every 7 (Seven) Days.   2/27/2018 at 0700   • amiodarone (PACERONE) 200 MG tablet Take 100 mg by mouth 2 (Two) Times a Day.   2/27/2018 at 0800   • amLODIPine (NORVASC) 5 MG tablet Take 5 mg by mouth Daily.   2/27/2018 at 0800   • aspirin 81 MG chewable tablet Chew 81 mg Daily.   2/27/2018 at 0800   • bisacodyl (DULCOLAX) 5 MG EC tablet Take 5 mg by mouth Daily As Needed for Constipation.   Past Week at Unknown time   • Calcium Carb-Cholecalciferol (OYSTER SHELL CALCIUM/VITAMIN D) 250-125 MG-UNIT tablet tablet Take 2 tablets by mouth 2 (Two) Times a Day.   2/27/2018 at 0800   • cholecalciferol (VITAMIN D3) 400 units tablet Take 400 Units by mouth 2 (Two) Times a Day.   2/27/2018 at 0800   • docusate sodium (COLACE) 100 MG capsule Take 100 mg by mouth 2 (Two) Times a Day.      • gabapentin (NEURONTIN) 100 MG capsule Take 100 mg by mouth 2 (Two) Times a Day.   2/27/2018 at 0800   • hydrochlorothiazide (HYDRODIURIL) 12.5 MG tablet Take 12.5 mg by mouth Daily.   2/27/2018 at 0800   • metoprolol tartrate (LOPRESSOR) 25 MG tablet Take 12.5 mg by mouth 2 (Two) Times a Day.   2/27/2018 at 0800   • mirtazapine (REMERON) 15 MG tablet Take 15 mg by mouth Every Night.   2/26/2018 at 2100   • omeprazole (priLOSEC) 20 MG capsule Take 20 mg by mouth Daily.   2/27/2018 at 0800   •  polyethylene glycol (MIRALAX) packet Take 17 g by mouth Daily.   2/27/2018 at 0800   • potassium chloride (K-DUR) 10 MEQ CR tablet Take 20 mEq by mouth 2 (Two) Times a Day With Meals.   2/27/2018 at 0800   • rivaroxaban (XARELTO) 20 MG tablet Take 20 mg by mouth Daily.   2/27/2018 at 0800   • traMADol (ULTRAM) 50 MG tablet Take 50 mg by mouth Every 6 (Six) Hours As Needed for Moderate Pain .   2/27/2018 at 2300     Current Meds:     amiodarone 100 mg Oral BID   amLODIPine 5 mg Oral Daily   gabapentin 100 mg Oral BID   metoprolol tartrate 12.5 mg Oral Q12H   mirtazapine 15 mg Oral Nightly   pantoprazole 40 mg Oral QAM   polyethylene glycol 17 g Oral Daily     Allergies:  Allergies   Allergen Reactions   • Clinoril [Sulindac] Itching   • Codeine Itching   • Coumadin [Warfarin Sodium] GI Bleeding   • Ibuprofen-Oxycodone [Oxycodone-Ibuprofen] Itching   • Keflex [Cephalexin] Itching   • Mydriacyl [Tropicamide] Angioedema   • Penicillins Angioedema   • Phenylephrine Hcl Angioedema   • Sulfa Antibiotics Angioedema   • Zantac [Ranitidine Hcl] Nausea And Vomiting     Review of Systems  The following systems were reviewed and negative;  respiratory, cardiovascular, musculoskeletal and neurological     Objective     Vital Signs  Temp:  [97.5 °F (36.4 °C)-98.7 °F (37.1 °C)] 98.6 °F (37 °C)  Heart Rate:  [62-70] 65  Resp:  [18] 18  BP: (124-148)/(62-71) 139/67  Physical Exam:  General Appearance:    Alert, cooperative, in no acute distress   Head:    Normocephalic, without obvious abnormality, atraumatic   Eyes:            Lids and lashes normal, conjunctivae and sclerae normal, no   icterus   Throat:   No oral lesions, no thrush, oral mucosa moist   Neck:   No adenopathy, supple, trachea midline, no thyromegaly, no   carotid bruit, no JVD   Lungs:     Clear to auscultation,respirations regular, even and                   unlabored    Heart:    Regular rhythm and normal rate, normal S1 and S2, no            murmur, no gallop, no  rub, no click   Chest Wall:    No abnormalities observed   Abdomen:     Normal bowel sounds, no masses, no organomegaly, soft        non-tender, non-distended, no guarding, no rebound                 tenderness   Rectal:     Deferred   Extremities:   no edema, no cyanosis, no redness   Skin:   No bleeding, bruising or rash   Lymph nodes:   No palpable adenopathy   Psychiatric:  Judgement and insight: normal   Orientation to person place and time: normal   Mood and affect: normal   Results Review:   I reviewed the patient's new clinical results.      Results from last 7 days  Lab Units 02/28/18  0715   WBC 10*3/mm3 10.04   HEMOGLOBIN g/dL 5.3*   HEMATOCRIT % 19.0*   PLATELETS 10*3/mm3 527*           Invalid input(s): LABALBU, PROT      No results found for: LIPASE    Radiology:  XR Chest 1 View   Final Result   Bilateral basilar atelectasis/infiltrate and pleural   effusions. Cardiomegaly with pulmonary vascular congestion and mild   edema. Follow-up recommended.       This report was finalized on 2/28/2018 12:34 PM by Dr. Wilfredo Phillip MD.              Assessment/Plan   Patient Active Problem List   Diagnosis   • Anemia due to blood loss   • Lower GI bleed   • Atrial fibrillation   • Anemia due to blood loss, acute   • Essential hypertension, benign       I discussed the patients findings and my recommendations with patient and family.    1. GI Bleed- black stool and bright red blood reported while on Xarelto. Her last dose of Xarelto was the AM of 2/27/18.  2.  Severe acute blood loss anemia- hemodynamically stable.  3.  ? Colitis November 2017- [facility]  4. Afib and DVT on Xarelto- last dose morning of 2/27  5. ?  history of lower GI bleed in 2013  6. Chronic constipation- on miralax  7. Recent nonbloody vomiting    Records have been requested from [facility] and from primary care provider from possible colonic bleed in 2013.  She is currently being transfused packed red blood cells with  a hemoglobin of 5.3  She is in need of upper and lower endoscopic evaluation given her reports of black stool and grossly bloody stool with severe anemia with nonbloody vomiting.  Xarelto will need to held ×48 hours prior to endoscopic evaluation with cardiology approval.   EGD and colonoscopy when cardiology has given approval and if patient agrees. She is now undecided.     Andriy Kaplan MD

## 2018-03-03 NOTE — PROGRESS NOTES
"    Patient Name: Aria Fernando  :1930  87 y.o.      Patient Care Team:  Luci Ingram MD as PCP - General  Luci Ingram MD as PCP - Family Medicine  Ignacio Ruiz DO as PCP - Claims Attributed    Chief Complaint: PAF, OAC, GIB    Interval History: No CP/SOB, for endoscopy       Objective   Vital Signs  Temp:  [96.8 °F (36 °C)-98.4 °F (36.9 °C)] 98.4 °F (36.9 °C)  Heart Rate:  [64-72] 65  Resp:  [18-20] 18  BP: (112-134)/(62-91) 134/67    Intake/Output Summary (Last 24 hours) at 18 0706  Last data filed at 18 1800   Gross per 24 hour   Intake             1680 ml   Output              200 ml   Net             1480 ml     Flowsheet Rows         First Filed Value    Admission Height  162.6 cm (64\") Documented at 2018 0148    Admission Weight  67.3 kg (148 lb 4.8 oz) Documented at 2018 0148          Physical Exam:   General Appearance:    Alert, cooperative, in no acute distress   Lungs:     Clear to auscultation.  Normal respiratory effort and rate.      Heart:    Regular rhythm and normal rate, normal S1 and S2, no murmurs, gallops or rubs.     Chest Wall:    No abnormalities observed   Abdomen:     Soft, nontender, positive bowel sounds.     Extremities:   no cyanosis, clubbing or edema.  No marked joint deformities.  Adequate musculoskeletal strength.       Results Review:      Results from last 7 days  Lab Units 18  1320   SODIUM mmol/L 141   POTASSIUM mmol/L 3.7   CHLORIDE mmol/L 99   CO2 mmol/L 30.1*   BUN mg/dL 18   CREATININE mg/dL 1.04*   GLUCOSE mg/dL 103*   CALCIUM mg/dL 8.3*           Results from last 7 days  Lab Units 18  0532   WBC 10*3/mm3  --  11.79*   HEMOGLOBIN g/dL 11.0* 10.6*   HEMATOCRIT % 36.8 34.7*   PLATELETS 10*3/mm3  --  504*       Results from last 7 days  Lab Units 18  0532 18  0715   INR  1.45*  --    APTT seconds  --  55.3*       Results from last 7 days  Lab Units 18  1320   MAGNESIUM mg/dL 1.9       "             Medication Review:     amiodarone 100 mg Oral BID   amLODIPine 5 mg Oral Daily   gabapentin 100 mg Oral BID   metoprolol tartrate 12.5 mg Oral Q12H   mirtazapine 15 mg Oral Nightly   pantoprazole 40 mg Oral QAM   polyethylene glycol 17 g Oral Daily             Assessment/Plan   Active Hospital Problems (** Indicates Principal Problem)    Diagnosis Date Noted   • **Lower GI bleed [K92.2] 02/28/2018   • Hemorrhagic disorder due to extrinsic circulating anticoagulants [D68.32] 03/01/2018   • Acute respiratory failure with hypoxia [J96.01] 03/01/2018   • Acute on chronic diastolic congestive heart failure [I50.33] 03/01/2018   • Atrial fibrillation [I48.91] 02/28/2018   • Anemia due to blood loss, acute [D62] 02/28/2018   • Essential hypertension, benign [I10] 02/28/2018      Resolved Hospital Problems    Diagnosis Date Noted Date Resolved   No resolved problems to display.      1. Hx of AFib: - In NSR, On amiodarone and low-dose beta blocker.       2.  elevated [id number] - Remains off Xarelto 20 mg d/t acute GIB.        3.  HTN:     Controlled on low-dose beta blocker and Norvasc, follow     4.  EF 60-65% - No clinical evidence of fluid overload     Mc Humphries III, MD  [facility]  03/03/18  7:06 AM

## 2018-03-03 NOTE — ANESTHESIA POSTPROCEDURE EVALUATION
"Patient: Aria Fernando    Procedure Summary     Date Anesthesia Start Anesthesia Stop Room / Location    03/03/18 0937 1039  [facility] /  [facility]       Procedure Diagnosis Surgeon Provider    EGD with biopsy (N/A Esophagus);  Colonoscopy to Terminal ileum with APC treatment for 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 and cold biopsy (N/A ) Lower GI bleed  (Lower GI bleed [K92.2]) MD Luis Tan MD          Anesthesia Type: MAC  Last vitals  BP   117/63 (03/03/18 1057)   Temp   36.9 °C (98.4 °F) (03/03/18 1047)   Pulse   63 (03/03/18 1057)   Resp   14 (03/03/18 1057)     SpO2   93 % (03/03/18 1057)     Post Anesthesia Care and Evaluation    Patient location during evaluation: PACU  Patient participation: complete - patient participated  Level of consciousness: awake  Pain score: 0  Pain management: adequate  Airway patency: patent  Anesthetic complications: No anesthetic complications  PONV Status: none  Cardiovascular status: acceptable  Respiratory status: acceptable  Hydration status: acceptable    Comments: /63 (BP Location: Right arm, Patient Position: Lying)  Pulse 63  Temp 36.9 °C (98.4 °F) (Oral)   Resp 14  Ht 162.6 cm (64.02\")  Wt 62.3 kg (137 lb 4.8 oz)  SpO2 93%  BMI 23.56 kg/m2      "

## 2018-03-03 NOTE — PLAN OF CARE
Problem: GI Endoscopy (Adult)  Goal: Signs and Symptoms of Listed Potential Problems Will be Absent or Manageable (GI Endoscopy)  Outcome: Ongoing (interventions implemented as appropriate)   03/03/18 0851   GI Endoscopy   Problems Assessed (GI Endoscopy) all

## 2018-03-03 NOTE — DISCHARGE SUMMARY
Name: Aria Fernando  Age: 87 y.o.  Sex: female  :  1930  MRN: [id number]         Primary Care Physician: Luci Ingram MD      Date of Admission:  2018  Date of Discharge:  3/3/2018      CHIEF COMPLAINT     Weakness  Passing black stool       DISCHARGE DIAGNOSIS  Active Hospital Problems (** Indicates Principal Problem)    Diagnosis Date Noted   • **Lower GI bleed [K92.2] 2018   • Hemorrhagic disorder due to extrinsic circulating anticoagulants [D68.32] 2018   • Acute on chronic diastolic congestive heart failure [I50.33] 2018   • Atrial fibrillation [I48.91] 2018   • Anemia due to blood loss, acute [D62] 2018   • Essential hypertension, benign [I10] 2018      Resolved Hospital Problems    Diagnosis Date Noted Date Resolved   • Acute respiratory failure with hypoxia [J96.01] 2018       SECONDARY DIAGNOSES  Past Medical History:   Diagnosis Date   • A-fib    • Anticoagulant-induced bleeding    • Arthritis    • CHF (congestive heart failure)    • Coronary artery disease    • DVT (deep venous thrombosis)    • GERD (gastroesophageal reflux disease)    • History of transfusion    • Hyperlipidemia    • Hypertension    • 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  At the time of admission patient gives a history of having been discharged from the hospital just about 3 weeks and was  sent for rehabilitation to a rehabilitation center.  They have she was passing blood and black stool for almost a week and patient told the staff in the rehabilitation but they did not do anything until the day when she was sent to emergency room with a hemoglobin of 4.1.  She has received 3 units of packed cells.  Her Xarelto was held.  Her hemoglobin was stable throughout the hospital stay and she underwent endoscopy and EGD.  The EGD showed nonspecific gastritis.  The colonoscopy showed small polyp which was removed.  Also there  two colonic angioectasias. Treated with argon plasma coagulation (APC).  GI has recommended to hold the Xarelto for 3 days.  Patient is stable and will be discharged home with a follow-up with the primary care physician in a week's time for CBC.      PHYSICAL EXAM  Temp:  [96.8 °F (36 °C)-98.4 °F (36.9 °C)] 97.8 °F (36.6 °C)  Heart Rate:  [63-75] 70  Resp:  [14-20] 14  BP: (104-162)/(53-91) 130/67  Body mass index is 23.56 kg/(m^2).  Physical Exam  HEENT: Unremarkable, pupils are round equal and reacting to light   NECK: No lymphadenopathy, throat is clear,   RESPRATORY SYSTEM: Breath sounds are equal on both sides and are normal, no wheezes or crackles  CARDIOVASULAR SYSTEM: Heart rate is regular without murmur  ABDOMEN: Soft, no ascites, no hepatosplenomegaly.  EXTREMITIES: No cyanosis, clubbing or edema    CONDITION ON DISCHARGE  Stable.      DISCHARGE DISPOSITION   Home      ALLERGIES  Allergies   Allergen Reactions   • Clinoril [Sulindac] Itching   • Codeine Itching   • Coumadin [Warfarin Sodium] GI Bleeding   • Ibuprofen-Oxycodone [Oxycodone-Ibuprofen] Itching   • Keflex [Cephalexin] Itching   • Mydriacyl [Tropicamide] Angioedema   • Penicillins Angioedema   • Phenylephrine Hcl Angioedema   • Sulfa Antibiotics Angioedema   • Zantac [Ranitidine Hcl] Nausea And Vomiting       RECENT LABS    Results from last 7 days  Lab Units 03/03/18  0802 03/02/18 2001 03/01/18  1283  02/28/18  0715   WBC 10*3/mm3  --   --  11.79* 10.04   HEMOGLOBIN g/dL 12.0 11.0* 10.6* 5.3*   HEMATOCRIT % 40.1 36.8 34.7* 19.0*   PLATELETS 10*3/mm3  --   --  504* 527*       Results from last 7 days  Lab Units 03/02/18  1320 03/01/18  0532   SODIUM mmol/L 141 139   POTASSIUM mmol/L 3.7 3.6   CHLORIDE mmol/L 99 98   CO2 mmol/L 30.1* 27.9   BUN mg/dL 18 14   CREATININE mg/dL 1.04* 0.87   GLUCOSE mg/dL 103* 84   CALCIUM mg/dL 8.3* 8.4*       Results from last 7 days  Lab Units 03/01/18  0532   INR  1.45*       DIET;  Diet Order   Procedures   • Diet Regular       DISCHARGE MEDICATIONS     Your medication list      CONTINUE taking these medications       Instructions Last Dose Given Next Dose Due    acetaminophen 500 MG tablet   Commonly known as:  TYLENOL              alendronate 70 MG tablet   Commonly known as:  FOSAMAX              amiodarone 200 MG tablet   Commonly known as:  PACERONE              amLODIPine 5 MG tablet   Commonly known as:  NORVASC              aspirin 81 MG chewable tablet              cholecalciferol 400 units tablet   Commonly known as:  VITAMIN D3              docusate sodium 100 MG capsule   Commonly known as:  COLACE              gabapentin 100 MG capsule   Commonly known as:  NEURONTIN              hydrochlorothiazide 12.5 MG tablet   Commonly known as:  HYDRODIURIL              metoprolol tartrate 25 MG tablet   Commonly known as:  LOPRESSOR              mirtazapine 15 MG tablet   Commonly known as:  REMERON              omeprazole 20 MG capsule   Commonly known as:  priLOSEC              oyster shell calcium/vitamin d 250-125 MG-UNIT tablet tablet              polyethylene glycol packet   Commonly known as:  MIRALAX              potassium chloride 10 MEQ CR tablet   Commonly known as:  K-DUR              rivaroxaban 20 MG tablet   Commonly known as:  XARELTO              traMADol 50 MG tablet   Commonly known as:  ULTRAM                STOP taking these medications          bisacodyl  5 MG EC tablet   Commonly known as:  DULCOLAX                No future appointments.  Follow-up Information     Follow up with Luci Ingram MD Follow up in 1 week(s).    Specialty:  Internal Medicine    Why:  CBC    Contact information:    [address] [place], [place] [id number] [phone]          Restart Xeralto on 3/6/2018    TEST  RESULTS PENDING AT DISCHARGE  None   Order Current Status    Tissue Pathology Exam Collected (03/03/18 6581)           CODE STATUS  Full Code      Joni Espinoza MD  [facility]  03/03/18      Time: greater than 35 minutes.

## 2018-03-03 NOTE — PLAN OF CARE
Problem: Patient Care Overview (Adult)  Goal: Plan of Care Review  Outcome: Ongoing (interventions implemented as appropriate)   03/02/18 2025   Coping/Psychosocial Response Interventions   Plan Of Care Reviewed With patient;family   Patient Care Overview   Progress improving   Outcome Evaluation   Outcome Summary/Follow up Plan No stooling, no signs of bleeding. Xarelto on hold. Bowel prep started. Permits signed. Pt and family state that a female MD doing scopes was ok. Telemetry NSR.       Problem: Pain, Acute (Adult)  Goal: Identify Related Risk Factors and Signs and Symptoms  Outcome: Ongoing (interventions implemented as appropriate)   03/02/18 2025   Pain, Acute   Related Risk Factors (Acute Pain) patient perception   Signs and Symptoms (Acute Pain) verbalization of pain descriptors     Goal: Acceptable Pain Control/Comfort Level  Outcome: Ongoing (interventions implemented as appropriate)      Problem: Wound, Traumatic, Nonburn (Adult)  Goal: Signs and Symptoms of Listed Potential Problems Will be Absent or Manageable (Wound, Traumatic, Nonburn)  Outcome: Ongoing (interventions implemented as appropriate)   03/02/18 2025   Wound, Traumatic, Nonburn   Problems Assessed (Wound) all   Problems Present (Wound) pain;skin breakdown       Problem: Pressure Ulcer Risk (Curtis Scale) (Adult,Obstetrics,Pediatric)  Goal: Identify Related Risk Factors and Signs and Symptoms  Outcome: Ongoing (interventions implemented as appropriate)    Goal: Skin Integrity  Outcome: Ongoing (interventions implemented as appropriate)

## 2018-03-03 NOTE — PLAN OF CARE
Problem: Patient Care Overview (Adult)  Goal: Plan of Care Review  Outcome: Ongoing (interventions implemented as appropriate)   03/03/18 0353   Coping/Psychosocial Response Interventions   Plan Of Care Reviewed With patient   Patient Care Overview   Progress no change   Outcome Evaluation   Outcome Summary/Follow up Plan Medicated for pain with relief. VSS. Rested well. Medications administered per orders. Bowel prep (1/2 part) started. Tolerating well. No s/s of distress at this time. Will continue to monitor.      Goal: Adult Individualization and Mutuality  Outcome: Ongoing (interventions implemented as appropriate)    Goal: Discharge Needs Assessment  Outcome: Ongoing (interventions implemented as appropriate)      Problem: Pain, Acute (Adult)  Goal: Identify Related Risk Factors and Signs and Symptoms  Outcome: Outcome(s) achieved Date Met: 03/03/18    Goal: Acceptable Pain Control/Comfort Level  Outcome: Ongoing (interventions implemented as appropriate)      Problem: Mobility, Physical Impaired (Adult)  Goal: Identify Related Risk Factors and Signs and Symptoms  Outcome: Outcome(s) achieved Date Met: 03/03/18    Goal: Enhanced Mobility Skills  Outcome: Ongoing (interventions implemented as appropriate)    Goal: Enhanced Functionality Ability  Outcome: Ongoing (interventions implemented as appropriate)      Problem: Wound, Traumatic, Nonburn (Adult)  Goal: Signs and Symptoms of Listed Potential Problems Will be Absent or Manageable (Wound, Traumatic, Nonburn)  Outcome: Ongoing (interventions implemented as appropriate)      Problem: Pressure Ulcer Risk (Curtis Scale) (Adult,Obstetrics,Pediatric)  Goal: Identify Related Risk Factors and Signs and Symptoms  Outcome: Outcome(s) achieved Date Met: 03/03/18    Goal: Skin Integrity  Outcome: Ongoing (interventions implemented as appropriate)

## 2018-03-05 LAB
LAB AP CASE REPORT: NORMAL
Lab: NORMAL
PATH REPORT.FINAL DX SPEC: NORMAL
PATH REPORT.GROSS SPEC: NORMAL

## 2018-03-05 NOTE — PROGRESS NOTES
EGD biopsies with mild gastritis, no H pylori.      The polyp removed was tubular adenoma.      Needs to have repeat blood work with her PCP to follow her blood count, follow up with Dr Kaplan or Namrata Meeks in 4-6 weeks to follow.

## 2018-03-05 NOTE — PROGRESS NOTES
Case Management Discharge Note    Final Note: Plan home with [facility] NOHEMY Crowder RN    Discharge Placement     Facility/Agency Request Status Selected? Address Phone Number Fax Number    [facility] Accepted    Yes [address], [place] [address] [phone]        Other: Other (Private Auto)    Discharge Codes: 06  Discharged/transferred to home under care of organized home health service organization in anticipation of skilled care

## 2018-03-16 ENCOUNTER — TELEPHONE (OUTPATIENT)
Dept: GASTROENTEROLOGY | Facility: CLINIC | Age: 83
End: 2018-03-16

## 2018-03-16 NOTE — TELEPHONE ENCOUNTER
Call to daughter, Ina (see POA of 3/4/18).  Advise per DR Carroll that EGD bx with mild gastritis, no H pylori.     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    Needs to have repeat blood  work with PCP to follow blood count.  F/u with DR Kaplan or STEFFANIE Meeks in 4-6 weeks to follow.    Ina verb understanding.  [facility] saw Hematologist, Dr Hathaway, yesterday and will continue to follow.  Upcoming appt with PCP Dr Nai Ingram in [facility].  Requests path report be faxed to that office.  Faxed to  - confirmation received.    Ina asking if can f/u with GI as needed because of distance from [place] and difficulty bringing frail pt.  Question to DR Carroll.

## 2018-03-16 NOTE — TELEPHONE ENCOUNTER
----- Message from Terrie Carroll MD sent at 3/5/2018  6:15 PM EST -----  EGD biopsies with mild gastritis, no H pylori.      The polyp removed was tubular adenoma.      Needs to have repeat blood work with her PCP to follow her blood count, follow up with Dr Kaplan or Namrata Meeks in 4-6 weeks to follow.

## 2018-03-19 NOTE — TELEPHONE ENCOUNTER
Call to Ina.  Advise per DR Carroll that fine to f/u as needed as long as blood count is being followed.  Ina verb understanding.

## 2018-09-18 ENCOUNTER — OFFICE VISIT (OUTPATIENT)
Dept: OBSTETRICS AND GYNECOLOGY | Facility: CLINIC | Age: 83
End: 2018-09-18

## 2018-09-18 VITALS
BODY MASS INDEX: 24.92 KG/M2 | SYSTOLIC BLOOD PRESSURE: 120 MMHG | DIASTOLIC BLOOD PRESSURE: 76 MMHG | HEIGHT: 64 IN | WEIGHT: 146 LBS

## 2018-09-18 DIAGNOSIS — N95.0 POSTMENOPAUSAL BLEEDING: Primary | ICD-10-CM

## 2018-09-18 PROCEDURE — 99213 OFFICE O/P EST LOW 20 MIN: CPT | Performed by: OBSTETRICS & GYNECOLOGY

## 2018-09-18 NOTE — PROGRESS NOTES
"      Aria Fernando is a 87 y.o. patient who presents for follow up of   Chief Complaint   Patient presents with   • Vaginal Bleeding       HPI 87-year-old female with a 1 day history of vaginal bleeding.  She is obviously been menopausal for many years and had 1 day of dark maroon vaginal bleeding.  She was seen by her primary care physician who sent her here.  She's had no bleeding since.  She denies any pain or vaginal discharge.    The following portions of the patient's history were reviewed and updated as appropriate: allergies, current medications and problem list.    Review of Systems   Constitutional: Negative for appetite change, fever and unexpected weight change.   Respiratory: Negative for cough and shortness of breath.    Cardiovascular: Negative for chest pain and palpitations.   Gastrointestinal: Negative for abdominal distention, abdominal pain, constipation, diarrhea, nausea and vomiting.   Endocrine: Negative.    Genitourinary: Negative for dyspareunia, menstrual problem, pelvic pain and vaginal discharge.   Skin: Negative.    Hematological: Negative.    Psychiatric/Behavioral: Negative for dysphoric mood and sleep disturbance. The patient is not nervous/anxious.        /76   Ht 162.6 cm (64.02\")   Wt 66.2 kg (146 lb)   BMI 25.05 kg/m²     Physical Exam   Constitutional: She is oriented to person, place, and time. She appears well-developed and well-nourished.   HENT:   Head: Normocephalic and atraumatic.   Abdominal: Soft. She exhibits no distension and no mass. There is no tenderness. There is no guarding.   Genitourinary: There is no rash, tenderness or lesion on the right labia. There is no rash, tenderness or lesion on the left labia. No erythema, tenderness or bleeding in the vagina. No foreign body in the vagina. No signs of injury around the vagina. No vaginal discharge found.   Neurological: She is alert and oriented to person, place, and time.   Psychiatric: She has a normal " mood and affect. Her behavior is normal. Judgment and thought content normal.   Nursing note and vitals reviewed.        Assessment/Plan    Aria was seen today for vaginal bleeding.    Diagnoses and all orders for this visit:    Postmenopausal bleeding    Bleeding has resolved.  The patient was treated for UTI around the same time.  This could possibly be the etiology of her bleeding.  No polyps are seen at the cervical os.  The patient's daughter declines vaginal probe ultrasound for endometrial thickness because she would not consent to hysteroscopy D&C to rule out endometrial hyperplasia or carcinoma at her age.  I think this is very reasonable and reassuring.  Follow-up as needed.    Return if symptoms worsen or fail to improve.      Nolan Henriquez MD  9/18/2018  4:41 PM

## 2022-05-25 ENCOUNTER — APPOINTMENT (OUTPATIENT)
Dept: ULTRASOUND IMAGING | Facility: HOSPITAL | Age: 87
End: 2022-05-25

## 2022-05-25 ENCOUNTER — HOSPITAL ENCOUNTER (INPATIENT)
Facility: HOSPITAL | Age: 87
LOS: 1 days | Discharge: NURSING, REHAB OR LONG-TERM CARE | End: 2022-05-31
Attending: HOSPITALIST | Admitting: HOSPITALIST

## 2022-05-25 ENCOUNTER — APPOINTMENT (OUTPATIENT)
Dept: GENERAL RADIOLOGY | Facility: HOSPITAL | Age: 87
End: 2022-05-25

## 2022-05-25 PROBLEM — T14.8XXA HEMATOMA: Status: ACTIVE | Noted: 2022-05-25

## 2022-05-25 LAB
25(OH)D3 SERPL-MCNC: 59.7 NG/ML (ref 30–100)
ALBUMIN SERPL-MCNC: 3.6 G/DL (ref 3.5–5.2)
ALBUMIN/GLOB SERPL: 1.1 G/DL
ALP SERPL-CCNC: 81 U/L (ref 39–117)
ALT SERPL W P-5'-P-CCNC: 13 U/L (ref 1–33)
ANION GAP SERPL CALCULATED.3IONS-SCNC: 10.7 MMOL/L (ref 5–15)
ANION GAP SERPL CALCULATED.3IONS-SCNC: 13 MMOL/L (ref 5–15)
AST SERPL-CCNC: 21 U/L (ref 1–32)
BACTERIA UR QL AUTO: ABNORMAL /HPF
BASOPHILS # BLD AUTO: 0.02 10*3/MM3 (ref 0–0.2)
BASOPHILS # BLD AUTO: 0.05 10*3/MM3 (ref 0–0.2)
BASOPHILS NFR BLD AUTO: 0.2 % (ref 0–1.5)
BASOPHILS NFR BLD AUTO: 0.4 % (ref 0–1.5)
BILIRUB SERPL-MCNC: 0.8 MG/DL (ref 0–1.2)
BILIRUB UR QL STRIP: NEGATIVE
BUN SERPL-MCNC: 43 MG/DL (ref 8–23)
BUN SERPL-MCNC: 43 MG/DL (ref 8–23)
BUN/CREAT SERPL: 19.2 (ref 7–25)
BUN/CREAT SERPL: 22.5 (ref 7–25)
CALCIUM SPEC-SCNC: 8.1 MG/DL (ref 8.2–9.6)
CALCIUM SPEC-SCNC: 8.5 MG/DL (ref 8.2–9.6)
CHLORIDE SERPL-SCNC: 100 MMOL/L (ref 98–107)
CHLORIDE SERPL-SCNC: 98 MMOL/L (ref 98–107)
CK SERPL-CCNC: 633 U/L (ref 20–180)
CK SERPL-CCNC: 647 U/L (ref 20–180)
CLARITY UR: ABNORMAL
CO2 SERPL-SCNC: 26.3 MMOL/L (ref 22–29)
CO2 SERPL-SCNC: 27 MMOL/L (ref 22–29)
COLOR UR: YELLOW
CREAT SERPL-MCNC: 1.91 MG/DL (ref 0.57–1)
CREAT SERPL-MCNC: 2.24 MG/DL (ref 0.57–1)
DEPRECATED RDW RBC AUTO: 51.4 FL (ref 37–54)
DEPRECATED RDW RBC AUTO: 51.8 FL (ref 37–54)
EGFRCR SERPLBLD CKD-EPI 2021: 20.2 ML/MIN/1.73
EGFRCR SERPLBLD CKD-EPI 2021: 24.5 ML/MIN/1.73
EOSINOPHIL # BLD AUTO: 0.05 10*3/MM3 (ref 0–0.4)
EOSINOPHIL # BLD AUTO: 0.1 10*3/MM3 (ref 0–0.4)
EOSINOPHIL NFR BLD AUTO: 0.4 % (ref 0.3–6.2)
EOSINOPHIL NFR BLD AUTO: 0.8 % (ref 0.3–6.2)
ERYTHROCYTE [DISTWIDTH] IN BLOOD BY AUTOMATED COUNT: 14.1 % (ref 12.3–15.4)
ERYTHROCYTE [DISTWIDTH] IN BLOOD BY AUTOMATED COUNT: 14.4 % (ref 12.3–15.4)
FERRITIN SERPL-MCNC: 220 NG/ML (ref 13–150)
FIBRINOGEN PPP-MCNC: 634 MG/DL (ref 200–400)
FOLATE SERPL-MCNC: 12.3 NG/ML (ref 4.78–24.2)
GLOBULIN UR ELPH-MCNC: 3.3 GM/DL
GLUCOSE BLDC GLUCOMTR-MCNC: 96 MG/DL (ref 70–130)
GLUCOSE SERPL-MCNC: 115 MG/DL (ref 65–99)
GLUCOSE SERPL-MCNC: 96 MG/DL (ref 65–99)
GLUCOSE UR STRIP-MCNC: NEGATIVE MG/DL
HCT VFR BLD AUTO: 30.1 % (ref 34–46.6)
HCT VFR BLD AUTO: 32.6 % (ref 34–46.6)
HGB BLD-MCNC: 10.6 G/DL (ref 12–15.9)
HGB BLD-MCNC: 9.9 G/DL (ref 12–15.9)
HGB UR QL STRIP.AUTO: NEGATIVE
HYALINE CASTS UR QL AUTO: ABNORMAL /LPF
IMM GRANULOCYTES # BLD AUTO: 0.04 10*3/MM3 (ref 0–0.05)
IMM GRANULOCYTES # BLD AUTO: 0.07 10*3/MM3 (ref 0–0.05)
IMM GRANULOCYTES NFR BLD AUTO: 0.3 % (ref 0–0.5)
IMM GRANULOCYTES NFR BLD AUTO: 0.5 % (ref 0–0.5)
INR PPP: 2.03 (ref 0.9–1.1)
IRON 24H UR-MRATE: 23 MCG/DL (ref 37–145)
IRON SATN MFR SERPL: 12 % (ref 20–50)
KETONES UR QL STRIP: NEGATIVE
LEUKOCYTE ESTERASE UR QL STRIP.AUTO: ABNORMAL
LYMPHOCYTES # BLD AUTO: 2.23 10*3/MM3 (ref 0.7–3.1)
LYMPHOCYTES # BLD AUTO: 3.01 10*3/MM3 (ref 0.7–3.1)
LYMPHOCYTES NFR BLD AUTO: 16 % (ref 19.6–45.3)
LYMPHOCYTES NFR BLD AUTO: 23.2 % (ref 19.6–45.3)
MCH RBC QN AUTO: 32.1 PG (ref 26.6–33)
MCH RBC QN AUTO: 32.8 PG (ref 26.6–33)
MCHC RBC AUTO-ENTMCNC: 32.5 G/DL (ref 31.5–35.7)
MCHC RBC AUTO-ENTMCNC: 32.9 G/DL (ref 31.5–35.7)
MCV RBC AUTO: 98.8 FL (ref 79–97)
MCV RBC AUTO: 99.7 FL (ref 79–97)
MONOCYTES # BLD AUTO: 1.56 10*3/MM3 (ref 0.1–0.9)
MONOCYTES # BLD AUTO: 1.82 10*3/MM3 (ref 0.1–0.9)
MONOCYTES NFR BLD AUTO: 12 % (ref 5–12)
MONOCYTES NFR BLD AUTO: 13.1 % (ref 5–12)
MUCOUS THREADS URNS QL MICRO: ABNORMAL /HPF
NEUTROPHILS NFR BLD AUTO: 63.5 % (ref 42.7–76)
NEUTROPHILS NFR BLD AUTO: 69.6 % (ref 42.7–76)
NEUTROPHILS NFR BLD AUTO: 8.25 10*3/MM3 (ref 1.7–7)
NEUTROPHILS NFR BLD AUTO: 9.69 10*3/MM3 (ref 1.7–7)
NITRITE UR QL STRIP: NEGATIVE
NRBC BLD AUTO-RTO: 0.1 /100 WBC (ref 0–0.2)
PH UR STRIP.AUTO: 5.5 [PH] (ref 4.5–8)
PLATELET # BLD AUTO: 191 10*3/MM3 (ref 140–450)
PLATELET # BLD AUTO: 197 10*3/MM3 (ref 140–450)
PMV BLD AUTO: 11.3 FL (ref 6–12)
PMV BLD AUTO: 11.4 FL (ref 6–12)
POTASSIUM SERPL-SCNC: 4 MMOL/L (ref 3.5–5.2)
POTASSIUM SERPL-SCNC: 4.3 MMOL/L (ref 3.5–5.2)
PROCALCITONIN SERPL-MCNC: 0.29 NG/ML (ref 0–0.25)
PROT SERPL-MCNC: 6.9 G/DL (ref 6–8.5)
PROT UR QL STRIP: NEGATIVE
PROTHROMBIN TIME: 23.4 SECONDS (ref 12.1–15)
QT INTERVAL: 401 MS
RBC # BLD AUTO: 3.02 10*6/MM3 (ref 3.77–5.28)
RBC # BLD AUTO: 3.3 10*6/MM3 (ref 3.77–5.28)
RBC # UR STRIP: ABNORMAL /HPF
REF LAB TEST METHOD: ABNORMAL
RENAL EPI CELLS #/AREA URNS HPF: ABNORMAL /HPF
SARS-COV-2 RNA PNL SPEC NAA+PROBE: NOT DETECTED
SODIUM SERPL-SCNC: 137 MMOL/L (ref 136–145)
SODIUM SERPL-SCNC: 138 MMOL/L (ref 136–145)
SP GR UR STRIP: 1.01 (ref 1–1.03)
SQUAMOUS #/AREA URNS HPF: ABNORMAL /HPF
TIBC SERPL-MCNC: 195 MCG/DL (ref 298–536)
UIBC SERPL-MCNC: 172 MCG/DL (ref 112–346)
UROBILINOGEN UR QL STRIP: ABNORMAL
VIT B12 BLD-MCNC: 267 PG/ML (ref 211–946)
WBC # UR STRIP: ABNORMAL /HPF
WBC NRBC COR # BLD: 12.98 10*3/MM3 (ref 3.4–10.8)
WBC NRBC COR # BLD: 13.91 10*3/MM3 (ref 3.4–10.8)

## 2022-05-25 PROCEDURE — 81001 URINALYSIS AUTO W/SCOPE: CPT | Performed by: NURSE PRACTITIONER

## 2022-05-25 PROCEDURE — 94799 UNLISTED PULMONARY SVC/PX: CPT

## 2022-05-25 PROCEDURE — 87635 SARS-COV-2 COVID-19 AMP PRB: CPT | Performed by: HOSPITALIST

## 2022-05-25 PROCEDURE — 99223 1ST HOSP IP/OBS HIGH 75: CPT | Performed by: NURSE PRACTITIONER

## 2022-05-25 PROCEDURE — 82550 ASSAY OF CK (CPK): CPT | Performed by: NURSE PRACTITIONER

## 2022-05-25 PROCEDURE — 85025 COMPLETE CBC W/AUTO DIFF WBC: CPT | Performed by: NURSE PRACTITIONER

## 2022-05-25 PROCEDURE — 82728 ASSAY OF FERRITIN: CPT | Performed by: NURSE PRACTITIONER

## 2022-05-25 PROCEDURE — 87088 URINE BACTERIA CULTURE: CPT | Performed by: NURSE PRACTITIONER

## 2022-05-25 PROCEDURE — 85610 PROTHROMBIN TIME: CPT | Performed by: ORTHOPAEDIC SURGERY

## 2022-05-25 PROCEDURE — 82607 VITAMIN B-12: CPT | Performed by: NURSE PRACTITIONER

## 2022-05-25 PROCEDURE — 82746 ASSAY OF FOLIC ACID SERUM: CPT | Performed by: NURSE PRACTITIONER

## 2022-05-25 PROCEDURE — 76775 US EXAM ABDO BACK WALL LIM: CPT

## 2022-05-25 PROCEDURE — 84145 PROCALCITONIN (PCT): CPT | Performed by: NURSE PRACTITIONER

## 2022-05-25 PROCEDURE — 99221 1ST HOSP IP/OBS SF/LOW 40: CPT | Performed by: ORTHOPAEDIC SURGERY

## 2022-05-25 PROCEDURE — 83550 IRON BINDING TEST: CPT | Performed by: NURSE PRACTITIONER

## 2022-05-25 PROCEDURE — 83540 ASSAY OF IRON: CPT | Performed by: NURSE PRACTITIONER

## 2022-05-25 PROCEDURE — 87186 SC STD MICRODIL/AGAR DIL: CPT | Performed by: NURSE PRACTITIONER

## 2022-05-25 PROCEDURE — 93010 ELECTROCARDIOGRAM REPORT: CPT | Performed by: INTERNAL MEDICINE

## 2022-05-25 PROCEDURE — 73560 X-RAY EXAM OF KNEE 1 OR 2: CPT

## 2022-05-25 PROCEDURE — 82306 VITAMIN D 25 HYDROXY: CPT | Performed by: NURSE PRACTITIONER

## 2022-05-25 PROCEDURE — 80053 COMPREHEN METABOLIC PANEL: CPT | Performed by: NURSE PRACTITIONER

## 2022-05-25 PROCEDURE — 93970 EXTREMITY STUDY: CPT

## 2022-05-25 PROCEDURE — 93005 ELECTROCARDIOGRAM TRACING: CPT | Performed by: NURSE PRACTITIONER

## 2022-05-25 PROCEDURE — 87086 URINE CULTURE/COLONY COUNT: CPT | Performed by: NURSE PRACTITIONER

## 2022-05-25 PROCEDURE — 82962 GLUCOSE BLOOD TEST: CPT

## 2022-05-25 PROCEDURE — 85384 FIBRINOGEN ACTIVITY: CPT | Performed by: NURSE PRACTITIONER

## 2022-05-25 RX ORDER — ACETAMINOPHEN 325 MG/1
650 TABLET ORAL EVERY 4 HOURS PRN
Status: DISCONTINUED | OUTPATIENT
Start: 2022-05-25 | End: 2022-05-31 | Stop reason: HOSPADM

## 2022-05-25 RX ORDER — DOCUSATE SODIUM 100 MG/1
100 CAPSULE, LIQUID FILLED ORAL 2 TIMES DAILY
Status: DISCONTINUED | OUTPATIENT
Start: 2022-05-25 | End: 2022-05-29

## 2022-05-25 RX ORDER — AMIODARONE HYDROCHLORIDE 200 MG/1
100 TABLET ORAL 2 TIMES DAILY
Status: DISCONTINUED | OUTPATIENT
Start: 2022-05-25 | End: 2022-05-31 | Stop reason: HOSPADM

## 2022-05-25 RX ORDER — ACETAMINOPHEN 160 MG/5ML
650 SOLUTION ORAL EVERY 4 HOURS PRN
Status: DISCONTINUED | OUTPATIENT
Start: 2022-05-25 | End: 2022-05-31 | Stop reason: HOSPADM

## 2022-05-25 RX ORDER — SODIUM CHLORIDE 0.9 % (FLUSH) 0.9 %
10 SYRINGE (ML) INJECTION EVERY 12 HOURS SCHEDULED
Status: DISCONTINUED | OUTPATIENT
Start: 2022-05-25 | End: 2022-05-31 | Stop reason: HOSPADM

## 2022-05-25 RX ORDER — SODIUM CHLORIDE 9 MG/ML
40 INJECTION, SOLUTION INTRAVENOUS AS NEEDED
Status: DISCONTINUED | OUTPATIENT
Start: 2022-05-25 | End: 2022-05-31 | Stop reason: HOSPADM

## 2022-05-25 RX ORDER — SODIUM CHLORIDE 9 MG/ML
125 INJECTION, SOLUTION INTRAVENOUS CONTINUOUS
Status: DISCONTINUED | OUTPATIENT
Start: 2022-05-25 | End: 2022-05-26

## 2022-05-25 RX ORDER — PANTOPRAZOLE SODIUM 40 MG/1
40 TABLET, DELAYED RELEASE ORAL EVERY MORNING
Status: DISCONTINUED | OUTPATIENT
Start: 2022-05-25 | End: 2022-05-31 | Stop reason: HOSPADM

## 2022-05-25 RX ORDER — ACETAMINOPHEN 500 MG
500 TABLET ORAL EVERY 4 HOURS PRN
COMMUNITY
End: 2022-05-31 | Stop reason: HOSPADM

## 2022-05-25 RX ORDER — SODIUM CHLORIDE 9 MG/ML
100 INJECTION, SOLUTION INTRAVENOUS CONTINUOUS
Status: DISCONTINUED | OUTPATIENT
Start: 2022-05-25 | End: 2022-05-25

## 2022-05-25 RX ORDER — GABAPENTIN 100 MG/1
100 CAPSULE ORAL 2 TIMES DAILY
Status: DISCONTINUED | OUTPATIENT
Start: 2022-05-25 | End: 2022-05-31 | Stop reason: HOSPADM

## 2022-05-25 RX ORDER — TRAMADOL HYDROCHLORIDE 50 MG/1
50 TABLET ORAL EVERY 8 HOURS PRN
Status: DISCONTINUED | OUTPATIENT
Start: 2022-05-25 | End: 2022-05-31 | Stop reason: HOSPADM

## 2022-05-25 RX ORDER — ONDANSETRON 4 MG/1
4 TABLET, FILM COATED ORAL EVERY 4 HOURS PRN
COMMUNITY
End: 2022-11-10 | Stop reason: HOSPADM

## 2022-05-25 RX ORDER — AMLODIPINE BESYLATE 5 MG/1
5 TABLET ORAL DAILY
Status: DISCONTINUED | OUTPATIENT
Start: 2022-05-25 | End: 2022-05-31 | Stop reason: HOSPADM

## 2022-05-25 RX ORDER — ACETAMINOPHEN 650 MG/1
650 SUPPOSITORY RECTAL EVERY 4 HOURS PRN
Status: DISCONTINUED | OUTPATIENT
Start: 2022-05-25 | End: 2022-05-31 | Stop reason: HOSPADM

## 2022-05-25 RX ORDER — CHOLECALCIFEROL (VITAMIN D3) 125 MCG
5 CAPSULE ORAL NIGHTLY PRN
Status: DISCONTINUED | OUTPATIENT
Start: 2022-05-25 | End: 2022-05-31 | Stop reason: HOSPADM

## 2022-05-25 RX ORDER — FERROUS SULFATE 325(65) MG
325 TABLET ORAL
COMMUNITY

## 2022-05-25 RX ORDER — POLYETHYLENE GLYCOL 3350 17 G/17G
17 POWDER, FOR SOLUTION ORAL DAILY
Status: DISCONTINUED | OUTPATIENT
Start: 2022-05-25 | End: 2022-05-31 | Stop reason: HOSPADM

## 2022-05-25 RX ORDER — PRAVASTATIN SODIUM 40 MG
80 TABLET ORAL DAILY
COMMUNITY
End: 2022-08-10

## 2022-05-25 RX ORDER — MIRTAZAPINE 15 MG/1
15 TABLET, FILM COATED ORAL NIGHTLY
Status: DISCONTINUED | OUTPATIENT
Start: 2022-05-25 | End: 2022-05-31 | Stop reason: HOSPADM

## 2022-05-25 RX ORDER — SODIUM CHLORIDE 0.9 % (FLUSH) 0.9 %
10 SYRINGE (ML) INJECTION AS NEEDED
Status: DISCONTINUED | OUTPATIENT
Start: 2022-05-25 | End: 2022-05-31 | Stop reason: HOSPADM

## 2022-05-25 RX ORDER — ONDANSETRON 2 MG/ML
4 INJECTION INTRAMUSCULAR; INTRAVENOUS EVERY 6 HOURS PRN
Status: DISCONTINUED | OUTPATIENT
Start: 2022-05-25 | End: 2022-05-31 | Stop reason: HOSPADM

## 2022-05-25 RX ORDER — ONDANSETRON 4 MG/1
4 TABLET, FILM COATED ORAL EVERY 6 HOURS PRN
Status: DISCONTINUED | OUTPATIENT
Start: 2022-05-25 | End: 2022-05-31 | Stop reason: HOSPADM

## 2022-05-25 RX ORDER — LANOLIN ALCOHOL/MO/W.PET/CERES
400 CREAM (GRAM) TOPICAL DAILY
COMMUNITY
End: 2022-05-31 | Stop reason: HOSPADM

## 2022-05-25 RX ORDER — POTASSIUM CHLORIDE 20 MEQ/1
40 TABLET, EXTENDED RELEASE ORAL DAILY
COMMUNITY
End: 2022-05-31 | Stop reason: HOSPADM

## 2022-05-25 RX ADMIN — TRAMADOL HYDROCHLORIDE 50 MG: 50 TABLET, COATED ORAL at 05:43

## 2022-05-25 RX ADMIN — TRAMADOL HYDROCHLORIDE 50 MG: 50 TABLET, COATED ORAL at 13:22

## 2022-05-25 RX ADMIN — AMIODARONE HYDROCHLORIDE 100 MG: 200 TABLET ORAL at 20:17

## 2022-05-25 RX ADMIN — MELATONIN TAB 5 MG 5 MG: 5 TAB at 20:17

## 2022-05-25 RX ADMIN — Medication 10 ML: at 02:00

## 2022-05-25 RX ADMIN — POLYETHYLENE GLYCOL 3350 17 G: 17 POWDER, FOR SOLUTION ORAL at 08:33

## 2022-05-25 RX ADMIN — GABAPENTIN 100 MG: 100 CAPSULE ORAL at 20:17

## 2022-05-25 RX ADMIN — DOCUSATE SODIUM 100 MG: 100 CAPSULE, LIQUID FILLED ORAL at 20:17

## 2022-05-25 RX ADMIN — SODIUM CHLORIDE 125 ML/HR: 9 INJECTION, SOLUTION INTRAVENOUS at 18:41

## 2022-05-25 RX ADMIN — SODIUM CHLORIDE 100 ML/HR: 9 INJECTION, SOLUTION INTRAVENOUS at 02:30

## 2022-05-25 RX ADMIN — SODIUM CHLORIDE 125 ML/HR: 9 INJECTION, SOLUTION INTRAVENOUS at 06:33

## 2022-05-25 RX ADMIN — METOPROLOL TARTRATE 12.5 MG: 25 TABLET, FILM COATED ORAL at 20:17

## 2022-05-25 RX ADMIN — METOPROLOL TARTRATE 12.5 MG: 25 TABLET, FILM COATED ORAL at 08:23

## 2022-05-25 RX ADMIN — GABAPENTIN 100 MG: 100 CAPSULE ORAL at 08:23

## 2022-05-25 RX ADMIN — AMIODARONE HYDROCHLORIDE 100 MG: 200 TABLET ORAL at 08:23

## 2022-05-25 RX ADMIN — AMLODIPINE BESYLATE 5 MG: 5 TABLET ORAL at 08:23

## 2022-05-25 RX ADMIN — ACETAMINOPHEN 650 MG: 325 TABLET ORAL at 11:40

## 2022-05-25 RX ADMIN — MIRTAZAPINE 15 MG: 15 TABLET, FILM COATED ORAL at 20:17

## 2022-05-25 RX ADMIN — Medication 10 ML: at 08:33

## 2022-05-25 RX ADMIN — DOCUSATE SODIUM 100 MG: 100 CAPSULE, LIQUID FILLED ORAL at 08:23

## 2022-05-25 RX ADMIN — Medication 10 ML: at 20:17

## 2022-05-25 RX ADMIN — ACETAMINOPHEN 650 MG: 325 TABLET ORAL at 02:30

## 2022-05-26 ENCOUNTER — APPOINTMENT (OUTPATIENT)
Dept: GENERAL RADIOLOGY | Facility: HOSPITAL | Age: 87
End: 2022-05-26

## 2022-05-26 LAB
ANION GAP SERPL CALCULATED.3IONS-SCNC: 11 MMOL/L (ref 5–15)
BASOPHILS # BLD AUTO: 0.02 10*3/MM3 (ref 0–0.2)
BASOPHILS NFR BLD AUTO: 0.2 % (ref 0–1.5)
BUN SERPL-MCNC: 33 MG/DL (ref 8–23)
BUN/CREAT SERPL: 23.6 (ref 7–25)
CALCIUM SPEC-SCNC: 7.8 MG/DL (ref 8.2–9.6)
CHLORIDE SERPL-SCNC: 102 MMOL/L (ref 98–107)
CK SERPL-CCNC: 352 U/L (ref 20–180)
CO2 SERPL-SCNC: 24 MMOL/L (ref 22–29)
CREAT SERPL-MCNC: 1.4 MG/DL (ref 0.57–1)
DEPRECATED RDW RBC AUTO: 53.2 FL (ref 37–54)
EGFRCR SERPLBLD CKD-EPI 2021: 35.6 ML/MIN/1.73
EOSINOPHIL # BLD AUTO: 0.34 10*3/MM3 (ref 0–0.4)
EOSINOPHIL NFR BLD AUTO: 3 % (ref 0.3–6.2)
ERYTHROCYTE [DISTWIDTH] IN BLOOD BY AUTOMATED COUNT: 14.4 % (ref 12.3–15.4)
GLUCOSE SERPL-MCNC: 103 MG/DL (ref 65–99)
HCT VFR BLD AUTO: 28.5 % (ref 34–46.6)
HGB BLD-MCNC: 9.2 G/DL (ref 12–15.9)
IMM GRANULOCYTES # BLD AUTO: 0.04 10*3/MM3 (ref 0–0.05)
IMM GRANULOCYTES NFR BLD AUTO: 0.4 % (ref 0–0.5)
LYMPHOCYTES # BLD AUTO: 2.18 10*3/MM3 (ref 0.7–3.1)
LYMPHOCYTES NFR BLD AUTO: 19.2 % (ref 19.6–45.3)
MCH RBC QN AUTO: 32.6 PG (ref 26.6–33)
MCHC RBC AUTO-ENTMCNC: 32.3 G/DL (ref 31.5–35.7)
MCV RBC AUTO: 101.1 FL (ref 79–97)
MONOCYTES # BLD AUTO: 1.65 10*3/MM3 (ref 0.1–0.9)
MONOCYTES NFR BLD AUTO: 14.5 % (ref 5–12)
NEUTROPHILS NFR BLD AUTO: 62.7 % (ref 42.7–76)
NEUTROPHILS NFR BLD AUTO: 7.13 10*3/MM3 (ref 1.7–7)
PLATELET # BLD AUTO: 209 10*3/MM3 (ref 140–450)
PMV BLD AUTO: 11.6 FL (ref 6–12)
POTASSIUM SERPL-SCNC: 4.1 MMOL/L (ref 3.5–5.2)
RBC # BLD AUTO: 2.82 10*6/MM3 (ref 3.77–5.28)
SODIUM SERPL-SCNC: 137 MMOL/L (ref 136–145)
TROPONIN T SERPL-MCNC: <0.01 NG/ML (ref 0–0.03)
WBC NRBC COR # BLD: 11.36 10*3/MM3 (ref 3.4–10.8)

## 2022-05-26 PROCEDURE — 84484 ASSAY OF TROPONIN QUANT: CPT | Performed by: NURSE PRACTITIONER

## 2022-05-26 PROCEDURE — 71046 X-RAY EXAM CHEST 2 VIEWS: CPT

## 2022-05-26 PROCEDURE — 82550 ASSAY OF CK (CPK): CPT | Performed by: NURSE PRACTITIONER

## 2022-05-26 PROCEDURE — 97165 OT EVAL LOW COMPLEX 30 MIN: CPT

## 2022-05-26 PROCEDURE — 99232 SBSQ HOSP IP/OBS MODERATE 35: CPT | Performed by: ORTHOPAEDIC SURGERY

## 2022-05-26 PROCEDURE — 85025 COMPLETE CBC W/AUTO DIFF WBC: CPT | Performed by: NURSE PRACTITIONER

## 2022-05-26 PROCEDURE — 97161 PT EVAL LOW COMPLEX 20 MIN: CPT

## 2022-05-26 PROCEDURE — 94799 UNLISTED PULMONARY SVC/PX: CPT

## 2022-05-26 PROCEDURE — 80048 BASIC METABOLIC PNL TOTAL CA: CPT | Performed by: NURSE PRACTITIONER

## 2022-05-26 PROCEDURE — 99233 SBSQ HOSP IP/OBS HIGH 50: CPT | Performed by: HOSPITALIST

## 2022-05-26 RX ADMIN — POLYETHYLENE GLYCOL 3350 17 G: 17 POWDER, FOR SOLUTION ORAL at 09:19

## 2022-05-26 RX ADMIN — METOPROLOL TARTRATE 12.5 MG: 25 TABLET, FILM COATED ORAL at 20:30

## 2022-05-26 RX ADMIN — TRAMADOL HYDROCHLORIDE 50 MG: 50 TABLET, COATED ORAL at 16:45

## 2022-05-26 RX ADMIN — MELATONIN TAB 5 MG 5 MG: 5 TAB at 20:30

## 2022-05-26 RX ADMIN — Medication 10 ML: at 20:30

## 2022-05-26 RX ADMIN — AMIODARONE HYDROCHLORIDE 100 MG: 200 TABLET ORAL at 09:16

## 2022-05-26 RX ADMIN — ACETAMINOPHEN 650 MG: 325 TABLET ORAL at 20:30

## 2022-05-26 RX ADMIN — PANTOPRAZOLE SODIUM 40 MG: 40 TABLET, DELAYED RELEASE ORAL at 06:47

## 2022-05-26 RX ADMIN — TRAMADOL HYDROCHLORIDE 50 MG: 50 TABLET, COATED ORAL at 01:09

## 2022-05-26 RX ADMIN — AMIODARONE HYDROCHLORIDE 100 MG: 200 TABLET ORAL at 20:30

## 2022-05-26 RX ADMIN — MIRTAZAPINE 15 MG: 15 TABLET, FILM COATED ORAL at 20:30

## 2022-05-26 RX ADMIN — METOPROLOL TARTRATE 12.5 MG: 25 TABLET, FILM COATED ORAL at 09:17

## 2022-05-26 RX ADMIN — TRAMADOL HYDROCHLORIDE 50 MG: 50 TABLET, COATED ORAL at 09:17

## 2022-05-26 RX ADMIN — SODIUM CHLORIDE 125 ML/HR: 9 INJECTION, SOLUTION INTRAVENOUS at 09:28

## 2022-05-26 RX ADMIN — DOCUSATE SODIUM 100 MG: 100 CAPSULE, LIQUID FILLED ORAL at 09:13

## 2022-05-26 RX ADMIN — DOCUSATE SODIUM 100 MG: 100 CAPSULE, LIQUID FILLED ORAL at 20:30

## 2022-05-26 RX ADMIN — Medication 10 ML: at 09:19

## 2022-05-26 RX ADMIN — AMLODIPINE BESYLATE 5 MG: 5 TABLET ORAL at 09:13

## 2022-05-26 RX ADMIN — GABAPENTIN 100 MG: 100 CAPSULE ORAL at 20:30

## 2022-05-26 RX ADMIN — ACETAMINOPHEN 650 MG: 325 TABLET ORAL at 13:34

## 2022-05-26 RX ADMIN — GABAPENTIN 100 MG: 100 CAPSULE ORAL at 09:17

## 2022-05-27 LAB
ALBUMIN SERPL-MCNC: 3.1 G/DL (ref 3.5–5.2)
ANION GAP SERPL CALCULATED.3IONS-SCNC: 11 MMOL/L (ref 5–15)
BACTERIA SPEC AEROBE CULT: ABNORMAL
BASOPHILS # BLD AUTO: 0.04 10*3/MM3 (ref 0–0.2)
BASOPHILS NFR BLD AUTO: 0.3 % (ref 0–1.5)
BUN SERPL-MCNC: 31 MG/DL (ref 8–23)
BUN/CREAT SERPL: 23.5 (ref 7–25)
CALCIUM SPEC-SCNC: 8.5 MG/DL (ref 8.2–9.6)
CHLORIDE SERPL-SCNC: 103 MMOL/L (ref 98–107)
CK SERPL-CCNC: 269 U/L (ref 20–180)
CO2 SERPL-SCNC: 23 MMOL/L (ref 22–29)
CREAT SERPL-MCNC: 1.32 MG/DL (ref 0.57–1)
DEPRECATED RDW RBC AUTO: 53 FL (ref 37–54)
EGFRCR SERPLBLD CKD-EPI 2021: 38.2 ML/MIN/1.73
EOSINOPHIL # BLD AUTO: 0.3 10*3/MM3 (ref 0–0.4)
EOSINOPHIL NFR BLD AUTO: 2.6 % (ref 0.3–6.2)
ERYTHROCYTE [DISTWIDTH] IN BLOOD BY AUTOMATED COUNT: 14.6 % (ref 12.3–15.4)
GLUCOSE SERPL-MCNC: 107 MG/DL (ref 65–99)
HCT VFR BLD AUTO: 29.4 % (ref 34–46.6)
HGB BLD-MCNC: 9.5 G/DL (ref 12–15.9)
IMM GRANULOCYTES # BLD AUTO: 0.07 10*3/MM3 (ref 0–0.05)
IMM GRANULOCYTES NFR BLD AUTO: 0.6 % (ref 0–0.5)
LYMPHOCYTES # BLD AUTO: 1.41 10*3/MM3 (ref 0.7–3.1)
LYMPHOCYTES NFR BLD AUTO: 12.1 % (ref 19.6–45.3)
MCH RBC QN AUTO: 32.8 PG (ref 26.6–33)
MCHC RBC AUTO-ENTMCNC: 32.3 G/DL (ref 31.5–35.7)
MCV RBC AUTO: 101.4 FL (ref 79–97)
MONOCYTES # BLD AUTO: 1.55 10*3/MM3 (ref 0.1–0.9)
MONOCYTES NFR BLD AUTO: 13.4 % (ref 5–12)
NEUTROPHILS NFR BLD AUTO: 71 % (ref 42.7–76)
NEUTROPHILS NFR BLD AUTO: 8.24 10*3/MM3 (ref 1.7–7)
NRBC BLD AUTO-RTO: 0.3 /100 WBC (ref 0–0.2)
PHOSPHATE SERPL-MCNC: 2.5 MG/DL (ref 2.5–4.5)
PLATELET # BLD AUTO: 222 10*3/MM3 (ref 140–450)
PMV BLD AUTO: 10.9 FL (ref 6–12)
POTASSIUM SERPL-SCNC: 4.4 MMOL/L (ref 3.5–5.2)
RBC # BLD AUTO: 2.9 10*6/MM3 (ref 3.77–5.28)
SODIUM SERPL-SCNC: 137 MMOL/L (ref 136–145)
WBC NRBC COR # BLD: 11.61 10*3/MM3 (ref 3.4–10.8)

## 2022-05-27 PROCEDURE — G0378 HOSPITAL OBSERVATION PER HR: HCPCS

## 2022-05-27 PROCEDURE — 85025 COMPLETE CBC W/AUTO DIFF WBC: CPT | Performed by: HOSPITALIST

## 2022-05-27 PROCEDURE — 80069 RENAL FUNCTION PANEL: CPT | Performed by: HOSPITALIST

## 2022-05-27 PROCEDURE — 25010000002 FUROSEMIDE PER 20 MG: Performed by: HOSPITALIST

## 2022-05-27 PROCEDURE — 97530 THERAPEUTIC ACTIVITIES: CPT

## 2022-05-27 PROCEDURE — 97110 THERAPEUTIC EXERCISES: CPT

## 2022-05-27 PROCEDURE — 82550 ASSAY OF CK (CPK): CPT | Performed by: HOSPITALIST

## 2022-05-27 PROCEDURE — 99232 SBSQ HOSP IP/OBS MODERATE 35: CPT | Performed by: ORTHOPAEDIC SURGERY

## 2022-05-27 PROCEDURE — 99232 SBSQ HOSP IP/OBS MODERATE 35: CPT | Performed by: HOSPITALIST

## 2022-05-27 RX ORDER — HYDROCODONE BITARTRATE AND ACETAMINOPHEN 5; 325 MG/1; MG/1
1 TABLET ORAL EVERY 8 HOURS PRN
Status: DISCONTINUED | OUTPATIENT
Start: 2022-05-27 | End: 2022-05-28

## 2022-05-27 RX ORDER — FUROSEMIDE 10 MG/ML
20 INJECTION INTRAMUSCULAR; INTRAVENOUS ONCE
Status: COMPLETED | OUTPATIENT
Start: 2022-05-27 | End: 2022-05-27

## 2022-05-27 RX ORDER — LEVOFLOXACIN 500 MG/1
250 TABLET, FILM COATED ORAL EVERY 24 HOURS
Status: COMPLETED | OUTPATIENT
Start: 2022-05-27 | End: 2022-05-29

## 2022-05-27 RX ADMIN — DOCUSATE SODIUM 100 MG: 100 CAPSULE, LIQUID FILLED ORAL at 21:58

## 2022-05-27 RX ADMIN — MIRTAZAPINE 15 MG: 15 TABLET, FILM COATED ORAL at 21:58

## 2022-05-27 RX ADMIN — TRAMADOL HYDROCHLORIDE 50 MG: 50 TABLET, COATED ORAL at 17:56

## 2022-05-27 RX ADMIN — FUROSEMIDE 20 MG: 10 INJECTION, SOLUTION INTRAMUSCULAR; INTRAVENOUS at 17:29

## 2022-05-27 RX ADMIN — AMIODARONE HYDROCHLORIDE 100 MG: 200 TABLET ORAL at 21:57

## 2022-05-27 RX ADMIN — Medication 10 ML: at 09:16

## 2022-05-27 RX ADMIN — AMIODARONE HYDROCHLORIDE 100 MG: 200 TABLET ORAL at 09:16

## 2022-05-27 RX ADMIN — MELATONIN TAB 5 MG 5 MG: 5 TAB at 21:57

## 2022-05-27 RX ADMIN — Medication 10 ML: at 21:59

## 2022-05-27 RX ADMIN — GABAPENTIN 100 MG: 100 CAPSULE ORAL at 09:16

## 2022-05-27 RX ADMIN — METOPROLOL TARTRATE 12.5 MG: 25 TABLET, FILM COATED ORAL at 09:16

## 2022-05-27 RX ADMIN — FUROSEMIDE 20 MG: 10 INJECTION, SOLUTION INTRAMUSCULAR; INTRAVENOUS at 10:47

## 2022-05-27 RX ADMIN — GABAPENTIN 100 MG: 100 CAPSULE ORAL at 21:58

## 2022-05-27 RX ADMIN — AMLODIPINE BESYLATE 5 MG: 5 TABLET ORAL at 09:16

## 2022-05-27 RX ADMIN — PANTOPRAZOLE SODIUM 40 MG: 40 TABLET, DELAYED RELEASE ORAL at 06:18

## 2022-05-27 RX ADMIN — LEVOFLOXACIN 250 MG: 500 TABLET, FILM COATED ORAL at 21:58

## 2022-05-27 RX ADMIN — POLYETHYLENE GLYCOL 3350 17 G: 17 POWDER, FOR SOLUTION ORAL at 09:16

## 2022-05-27 RX ADMIN — DOCUSATE SODIUM 100 MG: 100 CAPSULE, LIQUID FILLED ORAL at 09:16

## 2022-05-27 RX ADMIN — METOPROLOL TARTRATE 12.5 MG: 25 TABLET, FILM COATED ORAL at 21:58

## 2022-05-27 RX ADMIN — TRAMADOL HYDROCHLORIDE 50 MG: 50 TABLET, COATED ORAL at 06:18

## 2022-05-28 LAB
ALBUMIN SERPL-MCNC: 3 G/DL (ref 3.5–5.2)
ANION GAP SERPL CALCULATED.3IONS-SCNC: 11.5 MMOL/L (ref 5–15)
BUN SERPL-MCNC: 30 MG/DL (ref 8–23)
BUN/CREAT SERPL: 20 (ref 7–25)
CALCIUM SPEC-SCNC: 8.2 MG/DL (ref 8.2–9.6)
CHLORIDE SERPL-SCNC: 102 MMOL/L (ref 98–107)
CO2 SERPL-SCNC: 25.5 MMOL/L (ref 22–29)
CREAT SERPL-MCNC: 1.5 MG/DL (ref 0.57–1)
EGFRCR SERPLBLD CKD-EPI 2021: 32.8 ML/MIN/1.73
GLUCOSE SERPL-MCNC: 101 MG/DL (ref 65–99)
PHOSPHATE SERPL-MCNC: 3.1 MG/DL (ref 2.5–4.5)
POTASSIUM SERPL-SCNC: 3.9 MMOL/L (ref 3.5–5.2)
SODIUM SERPL-SCNC: 139 MMOL/L (ref 136–145)

## 2022-05-28 PROCEDURE — 93005 ELECTROCARDIOGRAM TRACING: CPT | Performed by: HOSPITALIST

## 2022-05-28 PROCEDURE — G0378 HOSPITAL OBSERVATION PER HR: HCPCS

## 2022-05-28 PROCEDURE — 94799 UNLISTED PULMONARY SVC/PX: CPT

## 2022-05-28 PROCEDURE — 93010 ELECTROCARDIOGRAM REPORT: CPT | Performed by: INTERNAL MEDICINE

## 2022-05-28 PROCEDURE — 80069 RENAL FUNCTION PANEL: CPT | Performed by: HOSPITALIST

## 2022-05-28 PROCEDURE — 99232 SBSQ HOSP IP/OBS MODERATE 35: CPT | Performed by: INTERNAL MEDICINE

## 2022-05-28 PROCEDURE — 97116 GAIT TRAINING THERAPY: CPT

## 2022-05-28 RX ADMIN — AMLODIPINE BESYLATE 5 MG: 5 TABLET ORAL at 09:43

## 2022-05-28 RX ADMIN — DOCUSATE SODIUM 100 MG: 100 CAPSULE, LIQUID FILLED ORAL at 09:43

## 2022-05-28 RX ADMIN — LEVOFLOXACIN 250 MG: 500 TABLET, FILM COATED ORAL at 21:14

## 2022-05-28 RX ADMIN — MELATONIN TAB 5 MG 5 MG: 5 TAB at 21:15

## 2022-05-28 RX ADMIN — Medication 10 ML: at 21:15

## 2022-05-28 RX ADMIN — MIRTAZAPINE 15 MG: 15 TABLET, FILM COATED ORAL at 21:14

## 2022-05-28 RX ADMIN — ACETAMINOPHEN 650 MG: 325 TABLET ORAL at 21:15

## 2022-05-28 RX ADMIN — PANTOPRAZOLE SODIUM 40 MG: 40 TABLET, DELAYED RELEASE ORAL at 06:09

## 2022-05-28 RX ADMIN — METOPROLOL TARTRATE 12.5 MG: 25 TABLET, FILM COATED ORAL at 09:43

## 2022-05-28 RX ADMIN — METOPROLOL TARTRATE 12.5 MG: 25 TABLET, FILM COATED ORAL at 21:15

## 2022-05-28 RX ADMIN — GABAPENTIN 100 MG: 100 CAPSULE ORAL at 09:43

## 2022-05-28 RX ADMIN — AMIODARONE HYDROCHLORIDE 100 MG: 200 TABLET ORAL at 09:43

## 2022-05-28 RX ADMIN — POLYETHYLENE GLYCOL 3350 17 G: 17 POWDER, FOR SOLUTION ORAL at 09:43

## 2022-05-28 RX ADMIN — TRAMADOL HYDROCHLORIDE 50 MG: 50 TABLET, COATED ORAL at 09:43

## 2022-05-28 RX ADMIN — GABAPENTIN 100 MG: 100 CAPSULE ORAL at 21:14

## 2022-05-28 RX ADMIN — DOCUSATE SODIUM 100 MG: 100 CAPSULE, LIQUID FILLED ORAL at 21:14

## 2022-05-28 RX ADMIN — HYDROCODONE BITARTRATE AND ACETAMINOPHEN 1 TABLET: 5; 325 TABLET ORAL at 00:22

## 2022-05-28 RX ADMIN — TRAMADOL HYDROCHLORIDE 50 MG: 50 TABLET, COATED ORAL at 18:19

## 2022-05-28 RX ADMIN — Medication 10 ML: at 09:44

## 2022-05-28 RX ADMIN — AMIODARONE HYDROCHLORIDE 100 MG: 200 TABLET ORAL at 21:14

## 2022-05-29 LAB
ANION GAP SERPL CALCULATED.3IONS-SCNC: 11.6 MMOL/L (ref 5–15)
BUN SERPL-MCNC: 30 MG/DL (ref 8–23)
BUN/CREAT SERPL: 21 (ref 7–25)
CALCIUM SPEC-SCNC: 9 MG/DL (ref 8.2–9.6)
CHLORIDE SERPL-SCNC: 100 MMOL/L (ref 98–107)
CO2 SERPL-SCNC: 25.4 MMOL/L (ref 22–29)
CREAT SERPL-MCNC: 1.43 MG/DL (ref 0.57–1)
DEPRECATED RDW RBC AUTO: 52.3 FL (ref 37–54)
EGFRCR SERPLBLD CKD-EPI 2021: 34.7 ML/MIN/1.73
ERYTHROCYTE [DISTWIDTH] IN BLOOD BY AUTOMATED COUNT: 14.4 % (ref 12.3–15.4)
GLUCOSE SERPL-MCNC: 97 MG/DL (ref 65–99)
HCT VFR BLD AUTO: 28.7 % (ref 34–46.6)
HGB BLD-MCNC: 9.5 G/DL (ref 12–15.9)
MCH RBC QN AUTO: 33.3 PG (ref 26.6–33)
MCHC RBC AUTO-ENTMCNC: 33.1 G/DL (ref 31.5–35.7)
MCV RBC AUTO: 100.7 FL (ref 79–97)
PLATELET # BLD AUTO: 296 10*3/MM3 (ref 140–450)
PMV BLD AUTO: 10.6 FL (ref 6–12)
POTASSIUM SERPL-SCNC: 4 MMOL/L (ref 3.5–5.2)
RBC # BLD AUTO: 2.85 10*6/MM3 (ref 3.77–5.28)
SODIUM SERPL-SCNC: 137 MMOL/L (ref 136–145)
WBC NRBC COR # BLD: 11.52 10*3/MM3 (ref 3.4–10.8)

## 2022-05-29 PROCEDURE — 80048 BASIC METABOLIC PNL TOTAL CA: CPT | Performed by: INTERNAL MEDICINE

## 2022-05-29 PROCEDURE — G0378 HOSPITAL OBSERVATION PER HR: HCPCS

## 2022-05-29 PROCEDURE — 94799 UNLISTED PULMONARY SVC/PX: CPT

## 2022-05-29 PROCEDURE — 99232 SBSQ HOSP IP/OBS MODERATE 35: CPT | Performed by: INTERNAL MEDICINE

## 2022-05-29 PROCEDURE — 97110 THERAPEUTIC EXERCISES: CPT | Performed by: PHYSICAL THERAPIST

## 2022-05-29 PROCEDURE — 85027 COMPLETE CBC AUTOMATED: CPT | Performed by: INTERNAL MEDICINE

## 2022-05-29 RX ORDER — BISACODYL 5 MG/1
5 TABLET, DELAYED RELEASE ORAL DAILY
Status: DISCONTINUED | OUTPATIENT
Start: 2022-05-29 | End: 2022-05-31 | Stop reason: HOSPADM

## 2022-05-29 RX ORDER — BISACODYL 10 MG
10 SUPPOSITORY, RECTAL RECTAL DAILY PRN
Status: DISCONTINUED | OUTPATIENT
Start: 2022-05-29 | End: 2022-05-31

## 2022-05-29 RX ADMIN — GABAPENTIN 100 MG: 100 CAPSULE ORAL at 21:51

## 2022-05-29 RX ADMIN — BISACODYL 5 MG: 5 TABLET, COATED ORAL at 08:54

## 2022-05-29 RX ADMIN — MIRTAZAPINE 15 MG: 15 TABLET, FILM COATED ORAL at 21:50

## 2022-05-29 RX ADMIN — Medication 10 ML: at 21:51

## 2022-05-29 RX ADMIN — AMLODIPINE BESYLATE 5 MG: 5 TABLET ORAL at 08:54

## 2022-05-29 RX ADMIN — AMIODARONE HYDROCHLORIDE 100 MG: 200 TABLET ORAL at 21:49

## 2022-05-29 RX ADMIN — POLYETHYLENE GLYCOL 3350 17 G: 17 POWDER, FOR SOLUTION ORAL at 08:56

## 2022-05-29 RX ADMIN — METOPROLOL TARTRATE 12.5 MG: 25 TABLET, FILM COATED ORAL at 21:51

## 2022-05-29 RX ADMIN — METOPROLOL TARTRATE 12.5 MG: 25 TABLET, FILM COATED ORAL at 08:55

## 2022-05-29 RX ADMIN — MELATONIN TAB 5 MG 5 MG: 5 TAB at 21:51

## 2022-05-29 RX ADMIN — LEVOFLOXACIN 250 MG: 500 TABLET, FILM COATED ORAL at 21:50

## 2022-05-29 RX ADMIN — AMIODARONE HYDROCHLORIDE 100 MG: 200 TABLET ORAL at 08:53

## 2022-05-29 RX ADMIN — GABAPENTIN 100 MG: 100 CAPSULE ORAL at 08:54

## 2022-05-29 RX ADMIN — TRAMADOL HYDROCHLORIDE 50 MG: 50 TABLET, COATED ORAL at 08:54

## 2022-05-29 RX ADMIN — PANTOPRAZOLE SODIUM 40 MG: 40 TABLET, DELAYED RELEASE ORAL at 08:56

## 2022-05-29 RX ADMIN — TRAMADOL HYDROCHLORIDE 50 MG: 50 TABLET, COATED ORAL at 18:23

## 2022-05-29 RX ADMIN — Medication 10 ML: at 08:58

## 2022-05-30 PROCEDURE — 94799 UNLISTED PULMONARY SVC/PX: CPT

## 2022-05-30 PROCEDURE — G0378 HOSPITAL OBSERVATION PER HR: HCPCS

## 2022-05-30 PROCEDURE — 99232 SBSQ HOSP IP/OBS MODERATE 35: CPT | Performed by: INTERNAL MEDICINE

## 2022-05-30 PROCEDURE — 97535 SELF CARE MNGMENT TRAINING: CPT

## 2022-05-30 PROCEDURE — 97110 THERAPEUTIC EXERCISES: CPT | Performed by: PHYSICAL THERAPIST

## 2022-05-30 RX ADMIN — GABAPENTIN 100 MG: 100 CAPSULE ORAL at 09:17

## 2022-05-30 RX ADMIN — Medication 10 ML: at 21:36

## 2022-05-30 RX ADMIN — BISACODYL 5 MG: 5 TABLET, COATED ORAL at 09:17

## 2022-05-30 RX ADMIN — TRAMADOL HYDROCHLORIDE 50 MG: 50 TABLET, COATED ORAL at 11:26

## 2022-05-30 RX ADMIN — AMLODIPINE BESYLATE 5 MG: 5 TABLET ORAL at 09:17

## 2022-05-30 RX ADMIN — AMIODARONE HYDROCHLORIDE 100 MG: 200 TABLET ORAL at 09:17

## 2022-05-30 RX ADMIN — POLYETHYLENE GLYCOL 3350 17 G: 17 POWDER, FOR SOLUTION ORAL at 09:16

## 2022-05-30 RX ADMIN — METOPROLOL TARTRATE 12.5 MG: 25 TABLET, FILM COATED ORAL at 21:36

## 2022-05-30 RX ADMIN — METOPROLOL TARTRATE 12.5 MG: 25 TABLET, FILM COATED ORAL at 09:17

## 2022-05-30 RX ADMIN — MIRTAZAPINE 15 MG: 15 TABLET, FILM COATED ORAL at 21:35

## 2022-05-30 RX ADMIN — PANTOPRAZOLE SODIUM 40 MG: 40 TABLET, DELAYED RELEASE ORAL at 06:13

## 2022-05-30 RX ADMIN — Medication 10 ML: at 09:17

## 2022-05-30 RX ADMIN — AMIODARONE HYDROCHLORIDE 100 MG: 200 TABLET ORAL at 21:34

## 2022-05-30 RX ADMIN — GABAPENTIN 100 MG: 100 CAPSULE ORAL at 21:35

## 2022-05-31 VITALS
OXYGEN SATURATION: 92 % | SYSTOLIC BLOOD PRESSURE: 135 MMHG | HEART RATE: 78 BPM | DIASTOLIC BLOOD PRESSURE: 73 MMHG | TEMPERATURE: 99.1 F | RESPIRATION RATE: 18 BRPM | BODY MASS INDEX: 37.14 KG/M2 | HEIGHT: 61 IN | WEIGHT: 196.7 LBS

## 2022-05-31 LAB
ANION GAP SERPL CALCULATED.3IONS-SCNC: 9.1 MMOL/L (ref 5–15)
BUN SERPL-MCNC: 21 MG/DL (ref 8–23)
BUN/CREAT SERPL: 19.6 (ref 7–25)
CALCIUM SPEC-SCNC: 9 MG/DL (ref 8.2–9.6)
CHLORIDE SERPL-SCNC: 100 MMOL/L (ref 98–107)
CO2 SERPL-SCNC: 26.9 MMOL/L (ref 22–29)
CREAT SERPL-MCNC: 1.07 MG/DL (ref 0.57–1)
DEPRECATED RDW RBC AUTO: 52.7 FL (ref 37–54)
EGFRCR SERPLBLD CKD-EPI 2021: 49.1 ML/MIN/1.73
ERYTHROCYTE [DISTWIDTH] IN BLOOD BY AUTOMATED COUNT: 14.6 % (ref 12.3–15.4)
GLUCOSE SERPL-MCNC: 101 MG/DL (ref 65–99)
HCT VFR BLD AUTO: 30 % (ref 34–46.6)
HGB BLD-MCNC: 9.5 G/DL (ref 12–15.9)
MCH RBC QN AUTO: 31.9 PG (ref 26.6–33)
MCHC RBC AUTO-ENTMCNC: 31.7 G/DL (ref 31.5–35.7)
MCV RBC AUTO: 100.7 FL (ref 79–97)
PLATELET # BLD AUTO: 401 10*3/MM3 (ref 140–450)
PMV BLD AUTO: 10 FL (ref 6–12)
POTASSIUM SERPL-SCNC: 4 MMOL/L (ref 3.5–5.2)
QT INTERVAL: 357 MS
RBC # BLD AUTO: 2.98 10*6/MM3 (ref 3.77–5.28)
SODIUM SERPL-SCNC: 136 MMOL/L (ref 136–145)
WBC NRBC COR # BLD: 11.19 10*3/MM3 (ref 3.4–10.8)

## 2022-05-31 PROCEDURE — 99232 SBSQ HOSP IP/OBS MODERATE 35: CPT | Performed by: ORTHOPAEDIC SURGERY

## 2022-05-31 PROCEDURE — 97530 THERAPEUTIC ACTIVITIES: CPT

## 2022-05-31 PROCEDURE — 85027 COMPLETE CBC AUTOMATED: CPT | Performed by: INTERNAL MEDICINE

## 2022-05-31 PROCEDURE — G0378 HOSPITAL OBSERVATION PER HR: HCPCS

## 2022-05-31 PROCEDURE — 99239 HOSP IP/OBS DSCHRG MGMT >30: CPT | Performed by: INTERNAL MEDICINE

## 2022-05-31 PROCEDURE — 97110 THERAPEUTIC EXERCISES: CPT

## 2022-05-31 PROCEDURE — 80048 BASIC METABOLIC PNL TOTAL CA: CPT | Performed by: INTERNAL MEDICINE

## 2022-05-31 RX ORDER — BISACODYL 10 MG
10 SUPPOSITORY, RECTAL RECTAL ONCE
Status: COMPLETED | OUTPATIENT
Start: 2022-05-31 | End: 2022-05-31

## 2022-05-31 RX ORDER — ACETAMINOPHEN 325 MG/1
650 TABLET ORAL EVERY 4 HOURS PRN
Start: 2022-05-31

## 2022-05-31 RX ADMIN — Medication 10 ML: at 09:31

## 2022-05-31 RX ADMIN — METOPROLOL TARTRATE 12.5 MG: 25 TABLET, FILM COATED ORAL at 09:31

## 2022-05-31 RX ADMIN — GABAPENTIN 100 MG: 100 CAPSULE ORAL at 09:31

## 2022-05-31 RX ADMIN — AMIODARONE HYDROCHLORIDE 100 MG: 200 TABLET ORAL at 09:32

## 2022-05-31 RX ADMIN — PANTOPRAZOLE SODIUM 40 MG: 40 TABLET, DELAYED RELEASE ORAL at 06:39

## 2022-05-31 RX ADMIN — BISACODYL 5 MG: 5 TABLET, COATED ORAL at 09:31

## 2022-05-31 RX ADMIN — AMLODIPINE BESYLATE 5 MG: 5 TABLET ORAL at 09:31

## 2022-05-31 RX ADMIN — BISACODYL 10 MG: 10 SUPPOSITORY RECTAL at 12:43

## 2022-05-31 RX ADMIN — POLYETHYLENE GLYCOL 3350 17 G: 17 POWDER, FOR SOLUTION ORAL at 09:31

## 2022-06-01 NOTE — CASE MANAGEMENT/SOCIAL WORK
Case Management Discharge Note      Final Note: MT to [facility]    Provided Post Acute Provider List?: N/A  Provided Post Acute Provider Quality & Resource List?: N/A    Selected Continued Care - Discharged on 5/31/2022 Admission date: 5/25/2022 - Discharge disposition: Short Term Hospital (DC - External)    Destination Coordination complete.    Service Provider Selected Services Address Phone Fax Patient Preferred    [facility]  Skilled Nursing [address] [facility] [id number] [phone] --          Durable Medical Equipment    No services have been selected for the patient.              Dialysis/Infusion    No services have been selected for the patient.              Home Medical Care    No services have been selected for the patient.              Therapy    No services have been selected for the patient.              Community Resources    No services have been selected for the patient.              Community & DME    No services have been selected for the patient.                       Final Discharge Disposition Code: 03 - skilled nursing facility (SNF)

## 2022-06-03 ENCOUNTER — TELEPHONE (OUTPATIENT)
Dept: ORTHOPEDIC SURGERY | Facility: CLINIC | Age: 87
End: 2022-06-03

## 2022-06-03 NOTE — TELEPHONE ENCOUNTER
They are concerned she has cellulitis and have somebody at the nursing facility, physician or nurse practitioner look at the wound and make a decision or take her to the emergency room if no one is available

## 2022-06-03 NOTE — TELEPHONE ENCOUNTER
Maggy with [facility] [phone] calling stating that they entire leg is swollen- the shin is red and hot and burning sensation to the patient. They did re wrap the leg with the splint. They are concerned for cellulitis.    She was discharged on 05.31.2022    DX:   Fracture right tibial plateau with PCL avulsion with fracture blisters    Please advise.

## 2022-06-03 NOTE — TELEPHONE ENCOUNTER
Facility advised [facility] will round on patient tomorrow- per Maggy she has already sent the provider images of the patients leg.    Thanks.

## 2022-06-23 ENCOUNTER — OFFICE VISIT (OUTPATIENT)
Dept: ORTHOPEDIC SURGERY | Facility: CLINIC | Age: 87
End: 2022-06-23

## 2022-06-23 ENCOUNTER — TELEPHONE (OUTPATIENT)
Dept: ORTHOPEDIC SURGERY | Facility: CLINIC | Age: 87
End: 2022-06-23

## 2022-06-23 VITALS
HEIGHT: 61 IN | BODY MASS INDEX: 37 KG/M2 | DIASTOLIC BLOOD PRESSURE: 65 MMHG | SYSTOLIC BLOOD PRESSURE: 127 MMHG | WEIGHT: 196 LBS | HEART RATE: 66 BPM

## 2022-06-23 DIAGNOSIS — R21 ACUTE SKIN ERUPTION OF DISCOLORATION, ELEVATIONS, BLISTERS: ICD-10-CM

## 2022-06-23 DIAGNOSIS — S82.112A DISPLACED FRACTURE OF LEFT TIBIAL SPINE, INITIAL ENCOUNTER FOR CLOSED FRACTURE: Primary | ICD-10-CM

## 2022-06-23 PROCEDURE — 99213 OFFICE O/P EST LOW 20 MIN: CPT | Performed by: ORTHOPAEDIC SURGERY

## 2022-06-23 RX ORDER — ROSUVASTATIN CALCIUM 5 MG/1
5 TABLET, COATED ORAL DAILY
COMMUNITY

## 2022-06-23 NOTE — PROGRESS NOTES
Subjective: Tibial spine fracture right leg with skin blisters     Patient ID: Aria Fernando is a 89 y.o. female.    Chief Complaint:    History of Present Illness 89-year-old female seen in follow-up hospital consultation for tibial spine fracture right knee with acute blisters involving the skin over the knee and proximal tibia.  Has been in the [facility] since discharge several weeks ago.  Has been nonweightbearing.  Patient was to variax his [facility] x-rays are not done.       Social History     Occupational History   • Not on file   Tobacco Use   • Smoking status: Never Smoker   • Smokeless tobacco: Never Used   Vaping Use   • Vaping Use: Never used   Substance and Sexual Activity   • Alcohol use: No   • Drug use: No   • Sexual activity: Never      Review of Systems      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)    • On anticoagulant therapy    • Osteoarthritis    • Polycythemia    • PVD (peripheral vascular disease) (HCC)      Past Surgical History:   Procedure Laterality Date   • APPENDECTOMY     • CAROTID ENDARTERECTOMY Left    • COLONOSCOPY N/A 3/3/2018    Procedure:  Colonoscopy to Terminal ileum with APC treatment for 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 No pertinent family history.      Objective:  Vitals:    06/23/22 0840   BP: 127/65   Pulse: 66         06/23/22  0840   Weight: 88.9 kg (196 lb)     Body mass index is 37.03  kg/m².        Ortho Exam   Patient seen in conjunction with family member today.  Her leg was examined.  The blisters have resolved except for approximately a 2 cm circular full-thickness skin lesion over the patella.  Remainder of the blisters are completely resolved.  The margins are clean but it is a full-thickness skin loss area.  Her calf is nontender.    Assessment:        1. Displaced fracture of left tibial spine, initial encounter for closed fracture    2. Acute skin eruption of discoloration, elevations, blisters           Plan: Sterile dressing was applied.  She needs to get into see the wound care center here at [facility] for care of that ulcerated lesion on her leg over the patella.  She is return to see me in 2 weeks they must x-rayed in the 1 day prior to that visit and CD or x-rays sent with the patient.  In the meantime again she is to remain strictly nonweightbearing.  We do not need to mobilize today but she cannot put weight on it.  If she must see the wound care center.  That is imperative.  Discussed this with the patient and her family member.  They were in agreement            Work Status:    LUCIA query complete.    Orders:  Orders Placed This Encounter   Procedures   • Ambulatory Referral to Wound Clinic       Medications:  No orders of the defined types were placed in this encounter.      Followup:  Return in about 2 weeks (around 7/7/2022).        Class 2 Severe Obesity (BMI >=35 and <=39.9). Obesity-related health conditions include the following: osteoarthritis. Obesity is unchanged. BMI is is above average; BMI management plan is completed. We discussed increasing exercise.    Dictated utilizing Dragon dictation

## 2022-06-23 NOTE — TELEPHONE ENCOUNTER
Salvador with [facility] calling for orders as per today's plan of care:    Plan: Sterile dressing was applied.  She needs to get into see the wound care center here at [facility] for care of that ulcerated lesion on her leg over the patella.  She is return to see me in 2 weeks they must x-rayed in the 1 day prior to that visit and CD or x-rays sent with the patient.  In the meantime again she is to remain strictly nonweightbearing.  We do not need to mobilize today but she cannot put weight on it.  If she must see the wound care center.  That is imperative.  Discussed this with the patient and her family member.  They were in agreement.    Called back and spoke with Haley re-faxed (via [employer] office note for today.

## 2022-07-07 ENCOUNTER — OFFICE VISIT (OUTPATIENT)
Dept: ORTHOPEDIC SURGERY | Facility: CLINIC | Age: 87
End: 2022-07-07

## 2022-07-07 VITALS — BODY MASS INDEX: 32.44 KG/M2 | WEIGHT: 171.8 LBS | HEIGHT: 61 IN

## 2022-07-07 DIAGNOSIS — R21 ACUTE SKIN ERUPTION OF DISCOLORATION, ELEVATIONS, BLISTERS: ICD-10-CM

## 2022-07-07 DIAGNOSIS — S82.112A DISPLACED FRACTURE OF LEFT TIBIAL SPINE, INITIAL ENCOUNTER FOR CLOSED FRACTURE: Primary | ICD-10-CM

## 2022-07-07 PROCEDURE — 99213 OFFICE O/P EST LOW 20 MIN: CPT | Performed by: ORTHOPAEDIC SURGERY

## 2022-07-07 NOTE — PROGRESS NOTES
Subjective: Right knee pain, tibial spine fracture     Patient ID: Aria Fernando is a 89 y.o. female.    Chief Complaint:    History of Present Illness 89-year-old female is now about 6 weeks out from her injury.  Again she was hospitalized for a large hematoma that resulted in a well-circumscribed full-thickness lesion over the anterolateral aspect of the knee and she is currently under the care of a wound care doctor.  The knee itself is not giving her any pain.  She has been nonweightbearing at the nursing facility.       Social History     Occupational History   • Not on file   Tobacco Use   • Smoking status: Never Smoker   • Smokeless tobacco: Never Used   Vaping Use   • Vaping Use: Never used   Substance and Sexual Activity   • Alcohol use: No   • Drug use: No   • Sexual activity: Never      Review of Systems   Constitutional: Negative for chills, diaphoresis, fever and unexpected weight change.   HENT: Negative for hearing loss, nosebleeds, sore throat and tinnitus.    Eyes: Negative for pain and visual disturbance.   Respiratory: Negative for cough, shortness of breath and wheezing.    Cardiovascular: Negative for chest pain and palpitations.   Gastrointestinal: Negative for abdominal pain, diarrhea, nausea and vomiting.   Endocrine: Negative for cold intolerance, heat intolerance and polydipsia.   Genitourinary: Negative for difficulty urinating, dysuria and hematuria.   Musculoskeletal: Positive for arthralgias, joint swelling and myalgias.   Skin: Negative for rash and wound.   Allergic/Immunologic: Negative for environmental allergies.   Neurological: Negative for dizziness, syncope and numbness.   Hematological: Does not bruise/bleed easily.   Psychiatric/Behavioral: Negative for dysphoric mood and sleep disturbance. The patient is not nervous/anxious.          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)    • On anticoagulant therapy    • Osteoarthritis    • Polycythemia    • PVD (peripheral vascular disease) ([facility])      Past Surgical History:   Procedure Laterality Date   • APPENDECTOMY     • CAROTID ENDARTERECTOMY Left    • COLONOSCOPY N/A 3/3/2018    Procedure:  Colonoscopy to Terminal ileum with APC treatment for 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      Objective:  There were no vitals filed for this visit.      07/07/22  1416 07/07/22  1422   Weight: 88.9 kg (196 lb) 77.9 kg (171 lb 12.8 oz)     Body mass index is 32.46 kg/m².        Ortho Exam   AP and lateral views of the knee and the tibia brought the patient in the nursing home shows no evidence of a fracture.  The fracture line is not visible on today's x-ray.  She has 0 to 120 degrees of motion with no pain in the knee itself.  No instability.  No swelling to the knee.  The calf    Assessment:        1. Displaced fracture of left tibial spine, initial encounter for closed fracture    2. Acute skin eruption of discoloration, elevations, blisters           Plan: Continue wound care per the wound care doctor.  History.  She is to begin to weight-bear as tolerated with the aid of a walker on that right knee.  She can be discharged from the nursing home when she is independent with her gait defer that to their physical therapy department.  She will return to see me in 6 weeks  A repeat x-ray of the knee on return.  Answered all questions            Work Status:    LUCIA query  complete.    Orders:  No orders of the defined types were placed in this encounter.      Medications:  No orders of the defined types were placed in this encounter.      Followup:  Return in about 6 weeks (around 8/18/2022).          Dictated utilizing Dragon dictation

## 2022-07-13 ENCOUNTER — LAB REQUISITION (OUTPATIENT)
Dept: LAB | Facility: HOSPITAL | Age: 87
End: 2022-07-13

## 2022-07-13 ENCOUNTER — APPOINTMENT (OUTPATIENT)
Dept: WOUND CARE | Facility: HOSPITAL | Age: 87
End: 2022-07-13

## 2022-07-13 DIAGNOSIS — L97.812 NON-PRESSURE CHRONIC ULCER OF OTHER PART OF RIGHT LOWER LEG WITH FAT LAYER EXPOSED: ICD-10-CM

## 2022-07-13 PROCEDURE — 87070 CULTURE OTHR SPECIMN AEROBIC: CPT | Performed by: NURSE PRACTITIONER

## 2022-07-13 PROCEDURE — 87186 SC STD MICRODIL/AGAR DIL: CPT | Performed by: NURSE PRACTITIONER

## 2022-07-13 PROCEDURE — 87147 CULTURE TYPE IMMUNOLOGIC: CPT | Performed by: NURSE PRACTITIONER

## 2022-07-13 PROCEDURE — 87205 SMEAR GRAM STAIN: CPT | Performed by: NURSE PRACTITIONER

## 2022-07-13 PROCEDURE — 87077 CULTURE AEROBIC IDENTIFY: CPT | Performed by: NURSE PRACTITIONER

## 2022-07-13 PROCEDURE — G0463 HOSPITAL OUTPT CLINIC VISIT: HCPCS

## 2022-07-16 LAB
BACTERIA SPEC AEROBE CULT: ABNORMAL
GRAM STN SPEC: ABNORMAL
GRAM STN SPEC: ABNORMAL

## 2022-07-20 ENCOUNTER — APPOINTMENT (OUTPATIENT)
Dept: WOUND CARE | Facility: HOSPITAL | Age: 87
End: 2022-07-20

## 2022-07-20 PROCEDURE — G0463 HOSPITAL OUTPT CLINIC VISIT: HCPCS

## 2022-07-22 ENCOUNTER — TRANSCRIBE ORDERS (OUTPATIENT)
Dept: ADMINISTRATIVE | Facility: HOSPITAL | Age: 87
End: 2022-07-22

## 2022-07-22 DIAGNOSIS — S80.01XD CONTUSION OF RIGHT KNEE, SUBSEQUENT ENCOUNTER: Primary | ICD-10-CM

## 2022-07-27 ENCOUNTER — APPOINTMENT (OUTPATIENT)
Dept: WOUND CARE | Facility: HOSPITAL | Age: 87
End: 2022-07-27

## 2022-07-28 ENCOUNTER — HOSPITAL ENCOUNTER (OUTPATIENT)
Dept: CT IMAGING | Facility: HOSPITAL | Age: 87
Discharge: HOME | End: 2022-07-28
Admitting: NURSE PRACTITIONER

## 2022-07-28 DIAGNOSIS — S80.01XD CONTUSION OF RIGHT KNEE, SUBSEQUENT ENCOUNTER: ICD-10-CM

## 2022-07-28 PROCEDURE — 73700 CT LOWER EXTREMITY W/O DYE: CPT

## 2022-08-03 ENCOUNTER — APPOINTMENT (OUTPATIENT)
Dept: WOUND CARE | Facility: HOSPITAL | Age: 87
End: 2022-08-03

## 2022-08-03 ENCOUNTER — TELEPHONE (OUTPATIENT)
Dept: ORTHOPEDIC SURGERY | Facility: CLINIC | Age: 87
End: 2022-08-03

## 2022-08-03 NOTE — TELEPHONE ENCOUNTER
Called and left a message with patient's POA per Dr. Varma he does not need to see the patient tomorrow as he does not do wound care. Per our practice manager she has contact general surgery as well as [facility] wound care to see if they are able to accept the patient.

## 2022-08-05 ENCOUNTER — TELEPHONE (OUTPATIENT)
Dept: ORTHOPEDIC SURGERY | Facility: CLINIC | Age: 87
End: 2022-08-05

## 2022-08-05 NOTE — TELEPHONE ENCOUNTER
A user error has taken place  ----- Message from Svetlana Diaz LPN sent at 8/4/2022  4:17 PM EDT -----  Dr. Larose is going out of town, but Laureen will get pt sched with Dr. Chaves.   ----- Message -----  From: Lucius Levine  Sent: 8/3/2022   2:26 PM EDT  To: ARTEMIO Sun  see this patient's CT the would care said she needs to be debrided and Dr Varma does not do this.  Does Dr HINOJOSA do it when it is like this.  Wound care has not put a note in the chart for today.

## 2022-08-05 NOTE — TELEPHONE ENCOUNTER
MA from Dr Lang said she is out of town but that Dr Chaves would see her.  She has an appointment with Dr Chaves scheduled

## 2022-08-09 RX ORDER — RIVAROXABAN 20 MG/1
20 TABLET, FILM COATED ORAL DAILY
COMMUNITY
Start: 2022-07-30 | End: 2022-11-10 | Stop reason: HOSPADM

## 2022-08-09 RX ORDER — FUROSEMIDE 40 MG/1
TABLET ORAL
COMMUNITY
Start: 2022-07-09 | End: 2022-08-10

## 2022-08-09 RX ORDER — VENLAFAXINE 75 MG/1
TABLET ORAL
COMMUNITY
Start: 2022-07-30 | End: 2022-11-10 | Stop reason: HOSPADM

## 2022-08-10 ENCOUNTER — OFFICE VISIT (OUTPATIENT)
Dept: SURGERY | Facility: CLINIC | Age: 87
End: 2022-08-10

## 2022-08-10 VITALS
WEIGHT: 172 LBS | DIASTOLIC BLOOD PRESSURE: 80 MMHG | BODY MASS INDEX: 32.47 KG/M2 | SYSTOLIC BLOOD PRESSURE: 120 MMHG | HEIGHT: 61 IN

## 2022-08-10 DIAGNOSIS — T14.8XXA OPEN WOUND: Primary | ICD-10-CM

## 2022-08-10 DIAGNOSIS — Z01.818 PRE-OPERATIVE CLEARANCE: ICD-10-CM

## 2022-08-10 PROCEDURE — 99203 OFFICE O/P NEW LOW 30 MIN: CPT | Performed by: SURGERY

## 2022-08-17 ENCOUNTER — OFFICE VISIT (OUTPATIENT)
Dept: CARDIOLOGY | Age: 87
End: 2022-08-17

## 2022-08-17 ENCOUNTER — LAB REQUISITION (OUTPATIENT)
Dept: LAB | Facility: HOSPITAL | Age: 87
End: 2022-08-17

## 2022-08-17 ENCOUNTER — APPOINTMENT (OUTPATIENT)
Dept: WOUND CARE | Facility: HOSPITAL | Age: 87
End: 2022-08-17

## 2022-08-17 VITALS
HEART RATE: 72 BPM | BODY MASS INDEX: 33.61 KG/M2 | WEIGHT: 178 LBS | SYSTOLIC BLOOD PRESSURE: 156 MMHG | HEIGHT: 61 IN | DIASTOLIC BLOOD PRESSURE: 72 MMHG

## 2022-08-17 DIAGNOSIS — S80.01XD CONTUSION OF RIGHT KNEE, SUBSEQUENT ENCOUNTER: ICD-10-CM

## 2022-08-17 DIAGNOSIS — I10 ESSENTIAL HYPERTENSION, BENIGN: ICD-10-CM

## 2022-08-17 DIAGNOSIS — Z79.01 ANTICOAGULATED: ICD-10-CM

## 2022-08-17 DIAGNOSIS — Z01.810 PREOP CARDIOVASCULAR EXAM: ICD-10-CM

## 2022-08-17 DIAGNOSIS — I48.0 PAROXYSMAL ATRIAL FIBRILLATION: Primary | ICD-10-CM

## 2022-08-17 DIAGNOSIS — Z92.29 H/O AMIODARONE THERAPY: ICD-10-CM

## 2022-08-17 PROCEDURE — 87070 CULTURE OTHR SPECIMN AEROBIC: CPT | Performed by: NURSE PRACTITIONER

## 2022-08-17 PROCEDURE — G0463 HOSPITAL OUTPT CLINIC VISIT: HCPCS

## 2022-08-17 PROCEDURE — 99203 OFFICE O/P NEW LOW 30 MIN: CPT | Performed by: INTERNAL MEDICINE

## 2022-08-17 PROCEDURE — 87205 SMEAR GRAM STAIN: CPT | Performed by: NURSE PRACTITIONER

## 2022-08-17 NOTE — PROGRESS NOTES
New Patient   2015 PT REF BY DR YENI MCCALL PRE OP CLEARANCE-OPEN WOUND STOPPING BLOOD THINNER REFERRAL LIST   Subjective:        [facility]  Cardiology Consult Note    Patient Identification:  Name: Aria Fernando  Age: 89 y.o.  Sex: female  :  1930  MRN: [id number]             CC  New pt  Knee surg clearance    History of Present Illness:   89-year-old female last seen several years ago with known history of atrial fibrillation, hypertension,, needs a clearance for the knee surgery, denies any chest pains or tightness in the chest    Comorbid cardiac risk factors:     Past Medical History:  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)    • On anticoagulant therapy    • Osteoarthritis    • Polycythemia    • PVD (peripheral vascular disease) (HCC)      Past Surgical History:  Past Surgical History:   Procedure Laterality Date   • APPENDECTOMY     • CAROTID ENDARTERECTOMY Left    • COLONOSCOPY N/A 3/3/2018    Procedure:  Colonoscopy to Terminal ileum with APC treatment for 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 and cold biopsy;  Surgeon: Terrie Carroll MD;  Location: [facility] ENDOSCOPY;  Service:    • ENDOSCOPY N/A 3/3/2018    Procedure: EGD with biopsy;  Surgeon: Terrie Carroll MD;  Location: [facility] ENDOSCOPY;  Service:       Allergies:  Allergies   Allergen Reactions   • Clinoril [Sulindac] Itching   • Codeine Itching   • Coumadin [Warfarin Sodium] GI Bleeding   • Ibuprofen-Oxycodone [Oxycodone-Ibuprofen] Itching   • Keflex [Cephalexin] Itching   • Mydriacyl [Tropicamide] Angioedema   • Don-Synephrine [Phenylephrine] Angioedema   • Penicillins Angioedema   •  Phenylephrine Hcl Angioedema   • Sulfa Antibiotics Angioedema   • Zantac [Ranitidine Hcl] Nausea And Vomiting     Home Meds:  (Not in a hospital admission)    Current Meds:   [unfilled]  Social History:   Social History     Tobacco Use   • Smoking status: Never Smoker   • Smokeless tobacco: Never Used   Substance Use Topics   • Alcohol use: No      Family History:  History reviewed. No pertinent family history.     Review of Systems    Constitutional: No weakness,fatigue, fever, rigors, chills   Eyes: No vision changes, eye pain   ENT/oropharynx: No difficulty swallowing, sore throat, epistaxis, changes in hearing   Cardiovascular: No chest pain, chest tightness, palpitations, paroxysmal nocturnal dyspnea, orthopnea, diaphoresis, dizziness / syncopal episode   Respiratory: No shortness of breath, dyspnea on exertion, cough, wheezing hemoptysis   Gastrointestinal: No abdominal pain, nausea, vomiting, diarrhea, bloody stools   Genitourinary: No hematuria, dysuria   Neurological: No headache, tremors, numbness,  one-sided weakness    Musculoskeletal: No cramps, myalgias,  joint pain, joint swelling   Integument: No rash, edema           Constitutional:  Heart Rate:  [72] 72  BP: (156)/(72) 156/72    Physical Exam   General:  Appears in no acute distress  Eyes: PERTL,  HEENT:  No JVD. Thyroid not visibly enlarged. No mucosal pallor or cyanosis  Respiratory: Respirations regular and unlabored at rest. BBS with good air entry in all fields. No crackles, rubs or wheezes auscultated  Cardiovascular: S1S2 Regular rate and rhythm. No murmur, rub or gallop auscultated. No carotid bruits. DP/PT pulses    . No pretibial pitting edema  Gastrointestinal: Abdomen soft, flat, non tender. Bowel sounds present. No hepatosplenomegaly. No ascites  Musculoskeletal: NUNES x4. No abnormal movements  Extremities: No digital clubbing or cyanosis  Skin: Color pink. Skin warm and dry to touch. 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 Paroxysmal atrial fibrillation (HCC)  I48.0 427.31   2. H/O amiodarone therapy  Z92.29 V87.49   3. Anticoagulated  Z79.01 V58.61   4. Essential hypertension, benign  I10 401.1   5. Preop cardiovascular exam  Z01.810 V72.81     1. Paroxysmal atrial fibrillation (HCC)  Normal sinus rhythm    2. H/O amiodarone therapy  Aria Fernando IS ON AMIODARONE,   Significant side effects associated with this medication has been explained     Pros and cons of the medications has been discussed, decision has been to continue the medication   6 months to yearly checkup for eye examination, thyroid function test, chest x-ray and liver function test has been advised    Patient understands well      3. Anticoagulated  Pros and cons as well as indication of the anticoagulation has been explained to the patient in detail    There are no obvious complications at this stage    Risk of  the bleedings has been explained    Need for the regular blood workup and adjust the dose has been explained    Need for proper follow-up on anticoagulation also has been explained      4. Essential hypertension, benign  Continue current treatment    5. Preop cardiovascular exam  Considering the patient's symptoms as well as clinical situation and  EKG findings, along with cardiac risk factors, ischemic workup is necessary to rule out  ischemic cardiomyopathy, stress induced arrhythmias, and functional capacity for diagnosis as well as prognostic consideration    Considering patient's medical condition as well as the risk factors, patient will require echocardiogram for further evaluation for the LV function, four-chamber evaluation, including the pressures, valvular function and  pericardial disease and pericardial effusion       [place]andy, echo  [place]  Follow up  To clear    Labs/tests ordered for am      Chaz Nelson MD  8/17/2022, 14:27 EDT      EMR Dragon/Transcription:   Dictated utilizing Dragon dictation

## 2022-08-19 ENCOUNTER — TRANSCRIBE ORDERS (OUTPATIENT)
Dept: ADMINISTRATIVE | Facility: HOSPITAL | Age: 87
End: 2022-08-19

## 2022-08-19 DIAGNOSIS — S80.01XA CONTUSION OF RIGHT KNEE, INITIAL ENCOUNTER: Primary | ICD-10-CM

## 2022-08-20 LAB
BACTERIA SPEC AEROBE CULT: NORMAL
GRAM STN SPEC: NORMAL
GRAM STN SPEC: NORMAL

## 2022-08-23 ENCOUNTER — HOSPITAL ENCOUNTER (OUTPATIENT)
Dept: MRI IMAGING | Facility: HOSPITAL | Age: 87
Discharge: HOME | End: 2022-08-23
Admitting: NURSE PRACTITIONER

## 2022-08-23 DIAGNOSIS — S80.01XA CONTUSION OF RIGHT KNEE, INITIAL ENCOUNTER: ICD-10-CM

## 2022-08-23 PROCEDURE — 73721 MRI JNT OF LWR EXTRE W/O DYE: CPT

## 2022-08-24 ENCOUNTER — APPOINTMENT (OUTPATIENT)
Dept: WOUND CARE | Facility: HOSPITAL | Age: 87
End: 2022-08-24

## 2022-08-30 ENCOUNTER — OFFICE VISIT (OUTPATIENT)
Dept: ORTHOPEDIC SURGERY | Facility: CLINIC | Age: 87
End: 2022-08-30

## 2022-08-30 VITALS — WEIGHT: 175 LBS | BODY MASS INDEX: 33.04 KG/M2 | HEIGHT: 61 IN

## 2022-08-30 DIAGNOSIS — S82.112A DISPLACED FRACTURE OF LEFT TIBIAL SPINE, INITIAL ENCOUNTER FOR CLOSED FRACTURE: Primary | ICD-10-CM

## 2022-08-30 PROCEDURE — 99212 OFFICE O/P EST SF 10 MIN: CPT | Performed by: ORTHOPAEDIC SURGERY

## 2022-08-30 RX ORDER — MORPHINE SULFATE 15 MG/1
TABLET ORAL
COMMUNITY
Start: 2022-08-27 | End: 2022-11-10 | Stop reason: HOSPADM

## 2022-08-30 RX ORDER — VANCOMYCIN HYDROCHLORIDE 1 G/20ML
INJECTION, POWDER, LYOPHILIZED, FOR SOLUTION INTRAVENOUS
COMMUNITY
Start: 2022-08-29 | End: 2022-11-10 | Stop reason: HOSPADM

## 2022-08-30 NOTE — PROGRESS NOTES
Subjective: Right tibial spine fracture, right lower leg wound     Patient ID: Aria Fernando is a 89 y.o. female.    Chief Complaint:    History of Present Illness patient follow-up to the tibial spine fracture.  He is under the care of the wound care center and Dr. Chaves for treatment and debridement of the soft tissue infection.  As far as the knee is concerned the tibial spine fracture has healed and she states she is able to ambulate with minimal pain.       Social History     Occupational History   • Not on file   Tobacco Use   • Smoking status: Never Smoker   • Smokeless tobacco: Never Used   Vaping Use   • Vaping Use: Never used   Substance and Sexual Activity   • Alcohol use: No   • Drug use: No   • Sexual activity: Never      Review of Systems      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)    • On anticoagulant therapy    • Osteoarthritis    • Polycythemia    • PVD (peripheral vascular disease) (HCC)      Past Surgical History:   Procedure Laterality Date   • APPENDECTOMY     • CAROTID ENDARTERECTOMY Left    • COLONOSCOPY N/A 3/3/2018    Procedure:  Colonoscopy to Terminal ileum with APC treatment for 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      Objective:  There were no vitals filed for this visit.      08/30/22  0854   Weight: 79.4 kg (175 lb)     Body mass index is 33.07 kg/m².        Ortho Exam   AP lateral of the knee from the nursing  home showed the tibial spine fracture healed.  She does have a soft dressing to the leg.    Assessment:        1. Displaced fracture of right tibial spine, initial encounter for closed fracture           Plan: The fractures are healed and she is ambulating without significant pain or discomfort so she will return to see me as needed.  Care now is under the direction of the wound care center and Dr. Chaves.  Discussed with the patient and family.  They are in agreement.  Return to see me.            Work Status:    LUCIA query complete.    Orders:  No orders of the defined types were placed in this encounter.      Medications:  No orders of the defined types were placed in this encounter.      Followup:  Return if symptoms worsen or fail to improve.          Dictated utilizing Dragon dictation

## 2022-08-31 ENCOUNTER — HOSPITAL ENCOUNTER (OUTPATIENT)
Dept: NUCLEAR MEDICINE | Facility: HOSPITAL | Age: 87
Discharge: HOME | End: 2022-08-31

## 2022-08-31 ENCOUNTER — APPOINTMENT (OUTPATIENT)
Dept: WOUND CARE | Facility: HOSPITAL | Age: 87
End: 2022-08-31

## 2022-08-31 ENCOUNTER — HOSPITAL ENCOUNTER (OUTPATIENT)
Dept: CARDIOLOGY | Facility: HOSPITAL | Age: 87
Discharge: HOME | End: 2022-08-31

## 2022-08-31 ENCOUNTER — APPOINTMENT (OUTPATIENT)
Dept: CARDIOLOGY | Facility: HOSPITAL | Age: 87
End: 2022-08-31

## 2022-08-31 ENCOUNTER — TELEPHONE (OUTPATIENT)
Dept: SURGERY | Facility: CLINIC | Age: 87
End: 2022-08-31

## 2022-08-31 PROCEDURE — A9500 TC99M SESTAMIBI: HCPCS | Performed by: INTERNAL MEDICINE

## 2022-08-31 PROCEDURE — 0 TECHNETIUM SESTAMIBI: Performed by: INTERNAL MEDICINE

## 2022-08-31 PROCEDURE — 78452 HT MUSCLE IMAGE SPECT MULT: CPT

## 2022-08-31 PROCEDURE — G0463 HOSPITAL OUTPT CLINIC VISIT: HCPCS

## 2022-08-31 PROCEDURE — 93018 CV STRESS TEST I&R ONLY: CPT | Performed by: INTERNAL MEDICINE

## 2022-08-31 PROCEDURE — 78452 HT MUSCLE IMAGE SPECT MULT: CPT | Performed by: INTERNAL MEDICINE

## 2022-08-31 PROCEDURE — 93017 CV STRESS TEST TRACING ONLY: CPT

## 2022-08-31 PROCEDURE — 25010000002 REGADENOSON 0.4 MG/5ML SOLUTION: Performed by: INTERNAL MEDICINE

## 2022-08-31 RX ADMIN — REGADENOSON 0.4 MG: 0.08 INJECTION, SOLUTION INTRAVENOUS at 10:16

## 2022-08-31 RX ADMIN — TECHNETIUM TC 99M SESTAMIBI 1 DOSE: 1 INJECTION INTRAVENOUS at 08:23

## 2022-08-31 RX ADMIN — TECHNETIUM TC 99M SESTAMIBI 1 DOSE: 1 INJECTION INTRAVENOUS at 10:18

## 2022-09-01 NOTE — TELEPHONE ENCOUNTER
Left message with cardiology office. Patient still requires further testing. They will call me back when this has been done.

## 2022-09-06 LAB
BH CV REST NUCLEAR ISOTOPE DOSE: 11.2 MCI
BH CV STRESS BP STAGE 1: NORMAL
BH CV STRESS COMMENTS STAGE 1: NORMAL
BH CV STRESS DOSE REGADENOSON STAGE 1: 0.4
BH CV STRESS DURATION MIN STAGE 1: 3
BH CV STRESS DURATION SEC STAGE 1: 0
BH CV STRESS GRADE STAGE 1: 10
BH CV STRESS HR STAGE 1: 57
BH CV STRESS METS STAGE 1: 5
BH CV STRESS NUCLEAR ISOTOPE DOSE: 35.9 MCI
BH CV STRESS O2 STAGE 1: 94
BH CV STRESS PROTOCOL 1: NORMAL
BH CV STRESS RECOVERY BP: NORMAL MMHG
BH CV STRESS RECOVERY HR: 65 BPM
BH CV STRESS RECOVERY O2: 96 %
BH CV STRESS SPEED STAGE 1: 1.7
BH CV STRESS STAGE 1: 1
LV EF NUC BP: 50 %
MAXIMAL PREDICTED HEART RATE: 129 BPM
PERCENT MAX PREDICTED HR: 56.59 %
STRESS BASELINE BP: NORMAL MMHG
STRESS BASELINE HR: 62 BPM
STRESS O2 SAT REST: 94 %
STRESS PERCENT HR: 67 %
STRESS POST ESTIMATED WORKLOAD: 1 METS
STRESS POST EXERCISE DUR SEC: 30 SEC
STRESS POST O2 SAT PEAK: 96 %
STRESS POST PEAK BP: NORMAL MMHG
STRESS POST PEAK HR: 73 BPM
STRESS TARGET HR: 110 BPM

## 2022-09-07 ENCOUNTER — APPOINTMENT (OUTPATIENT)
Dept: WOUND CARE | Facility: HOSPITAL | Age: 87
End: 2022-09-07

## 2022-09-08 ENCOUNTER — TELEPHONE (OUTPATIENT)
Dept: CARDIOLOGY | Facility: CLINIC | Age: 87
End: 2022-09-08

## 2022-09-08 NOTE — TELEPHONE ENCOUNTER
----- Message from Katty Machuca sent at 9/6/2022 11:55 AM EDT -----  Regarding: WHEN FOR ECHO?  PT'S STRESS IS ABN, INS DENIED ECHO.  I WOULD HAVE TO DO AN APPEAL WHICH WOULD TAKE UP TO 30 DAYS OR IF DR ROBERTSON PLANS ON CATH SHE CAN HAVE DONE IN HOSPITAL??? ADVISE    Will discuss with Dr. Nelson   Per Dr. Nelson From a cardiac stand point patient is cleared for surgery and xarelto for 3 days prior to surgery with a small risk.     Patient POA informed and letter has been sent

## 2022-09-13 NOTE — TELEPHONE ENCOUNTER
Received clearance from cardiology. (see letters tab). Patient is scheduled to follow up with Dr. Chaves in office tomorrow.

## 2022-09-14 ENCOUNTER — OFFICE VISIT (OUTPATIENT)
Dept: SURGERY | Facility: CLINIC | Age: 87
End: 2022-09-14

## 2022-09-14 ENCOUNTER — APPOINTMENT (OUTPATIENT)
Dept: WOUND CARE | Facility: HOSPITAL | Age: 87
End: 2022-09-14

## 2022-09-14 DIAGNOSIS — T14.8XXA OPEN WOUND: Primary | ICD-10-CM

## 2022-09-14 PROCEDURE — 99212 OFFICE O/P EST SF 10 MIN: CPT | Performed by: SURGERY

## 2022-09-14 PROCEDURE — G0463 HOSPITAL OUTPT CLINIC VISIT: HCPCS

## 2022-09-14 NOTE — PROGRESS NOTES
PATIENT INFORMATION  Aria Fernando       - 1930    CHIEF COMPLAINT  Chief Complaint   Patient presents with   • Follow-up     Open wound right leg   Patient presents for fu to right leg wound. States the bandage is changed BID with dakins wet to dry. No complaints    HISTORY OF PRESENT ILLNESS  HPI she is here today for follow-up on her open wound on her right knee.  She says she thinks the area is improving.  She is on IV vancomycin for this wound.  Her MRI showed no evidence of osteomyelitis.  She has received cardiac clearance for possible surgery.        REVIEW OF SYSTEMS  Review of Systems   Constitutional: Negative for activity change, chills, fever and unexpected weight change.   HENT: Negative for congestion.    Eyes: Negative for visual disturbance.   Respiratory: Negative for shortness of breath.    Cardiovascular: Negative for chest pain and palpitations.   Gastrointestinal: Negative for abdominal pain and blood in stool.   Endocrine: Negative for cold intolerance and heat intolerance.   Genitourinary: Negative for hematuria.   Musculoskeletal: Negative for gait problem.   Skin: Negative for color change.   Allergic/Immunologic: Negative for immunocompromised state.   Neurological: Negative for weakness and light-headedness.   Hematological: Negative for adenopathy.   Psychiatric/Behavioral: Negative for sleep disturbance. The patient is not nervous/anxious.          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]          PAST MEDICAL HISTORY  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)    • On anticoagulant therapy    • Osteoarthritis    • Polycythemia    • PVD (peripheral vascular disease) (HCC)          SURGICAL HISTORY  Past Surgical History:   Procedure Laterality Date   • APPENDECTOMY     • CAROTID ENDARTERECTOMY Left    • COLONOSCOPY N/A 3/3/2018    Procedure:  Colonoscopy to Terminal ileum with APC treatment for 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 No pertinent family history.      SOCIAL HISTORY  Social History     Occupational History   • Not on file   Tobacco Use   • Smoking status: Never Smoker   • Smokeless tobacco: Never Used   Vaping Use   • Vaping Use: Never used   Substance and Sexual Activity   • Alcohol use: No   • Drug use: No   • Sexual activity: Never         CURRENT MEDICATIONS    Current Outpatient Medications:   •  acetaminophen (TYLENOL) 325 MG tablet, Take 2 tablets by mouth Every 4 (Four) Hours As Needed for Mild Pain ., Disp: , Rfl:   •  alendronate (FOSAMAX) 70 MG tablet, Take 70 mg by mouth Every 7 (Seven) Days., Disp: , Rfl:   •  amiodarone (PACERONE) 200 MG tablet, Take 200 mg by mouth Daily., Disp: , Rfl:   •  amLODIPine (NORVASC) 5 MG tablet, Take 5 mg by mouth Daily., Disp: , Rfl:   •  aspirin 81 MG chewable tablet, Chew 81 mg Daily., Disp: , Rfl:   •   Calcium Carb-Cholecalciferol (OYSTER SHELL CALCIUM/VITAMIN D) 250-125 MG-UNIT tablet tablet, Take 2 tablets by mouth 2 (Two) Times a Day., Disp: , Rfl:   •  cholecalciferol (VITAMIN D3) 400 units tablet, Take 400 Units by mouth 2 (Two) Times a Day., Disp: , Rfl:   •  docusate sodium (COLACE) 100 MG capsule, Take 100 mg by mouth 2 (Two) Times a Day., Disp: , Rfl:   •  ferrous sulfate 325 (65 FE) MG tablet, Take 325 mg by mouth Daily With Breakfast., Disp: , Rfl:   •  gabapentin (NEURONTIN) 100 MG capsule, Take 100 mg by mouth 2 (Two) Times a Day., Disp: , Rfl:   •  metoprolol tartrate (LOPRESSOR) 25 MG tablet, Take 12.5 mg by mouth 2 (Two) Times a Day., Disp: , Rfl:   •  mirtazapine (REMERON) 15 MG tablet, Take 15 mg by mouth Every Night., Disp: , Rfl:   •  Morphine (MSIR) 15 MG tablet, , Disp: , Rfl:   •  omeprazole (priLOSEC) 20 MG capsule, Take 20 mg by mouth Daily., Disp: , Rfl:   •  ondansetron (ZOFRAN) 4 MG tablet, Take 4 mg by mouth Every 4 (Four) Hours As Needed for Nausea or Vomiting., Disp: , Rfl:   •  rosuvastatin (CRESTOR) 5 MG tablet, Take 5 mg by mouth Daily., Disp: , Rfl:   •  vancomycin (VANCOCIN) 1 g injection, , Disp: , Rfl:   •  venlafaxine (EFFEXOR) 75 MG tablet, , Disp: , Rfl:   •  Xarelto 20 MG tablet, , Disp: , Rfl:   •  doxycycline 100 mg in sodium chloride 0.9 % 100 mL IVPB, Infuse 100 mg into a venous catheter 2 (Two) Times a Day., Disp: , Rfl:     ALLERGIES  Clinoril [sulindac], Codeine, Coumadin [warfarin sodium], Ibuprofen-oxycodone [oxycodone-ibuprofen], Keflex [cephalexin], Mydriacyl [tropicamide], Don-synephrine [phenylephrine], Penicillins, Phenylephrine hcl, Sulfa antibiotics, and Zantac [ranitidine hcl]    VITALS  There were no vitals filed for this visit.  PHYSICAL EXAM  Debilities/Disabilities Identified: None  Emotional Behavior: Appropriate  Physical Exam elderly white female in a wheelchair in no active distress.  Her right knee wound has markedly improved.  The wound now is  edilia down and granulating.  The adherent necrotic exudate is nearly totally resolved.    ASSESSMENT  Open wound      PLAN  I do not think that surgical intervention is necessary at this point.  Discontinue her IV vancomycin I discussed this with the nurse practitioner that is seeing her Nadege Simeon.  Continue her wet-to-dry dressings with quarter strength Dakin's and follow-up with the wound care clinic.  I will see her back as needed

## 2022-09-14 NOTE — PROGRESS NOTES
Patient presents for fu to right leg wound. States the bandage is changed BID with dakins wet to dry. No complaints.

## 2022-09-21 ENCOUNTER — APPOINTMENT (OUTPATIENT)
Dept: WOUND CARE | Facility: HOSPITAL | Age: 87
End: 2022-09-21

## 2022-09-28 ENCOUNTER — APPOINTMENT (OUTPATIENT)
Dept: WOUND CARE | Facility: HOSPITAL | Age: 87
End: 2022-09-28

## 2022-10-05 ENCOUNTER — APPOINTMENT (OUTPATIENT)
Dept: WOUND CARE | Facility: HOSPITAL | Age: 87
End: 2022-10-05

## 2022-10-07 ENCOUNTER — APPOINTMENT (OUTPATIENT)
Dept: WOUND CARE | Facility: HOSPITAL | Age: 87
End: 2022-10-07

## 2022-10-07 PROCEDURE — 97607 NEG PRS WND THR NDME<=50SQCM: CPT

## 2022-10-12 ENCOUNTER — APPOINTMENT (OUTPATIENT)
Dept: WOUND CARE | Facility: HOSPITAL | Age: 87
End: 2022-10-12

## 2022-10-12 PROCEDURE — 97607 NEG PRS WND THR NDME<=50SQCM: CPT

## 2022-10-14 ENCOUNTER — APPOINTMENT (OUTPATIENT)
Dept: WOUND CARE | Facility: HOSPITAL | Age: 87
End: 2022-10-14

## 2022-10-14 PROCEDURE — 97607 NEG PRS WND THR NDME<=50SQCM: CPT

## 2022-10-19 ENCOUNTER — APPOINTMENT (OUTPATIENT)
Dept: WOUND CARE | Facility: HOSPITAL | Age: 87
End: 2022-10-19

## 2022-10-19 PROCEDURE — 97607 NEG PRS WND THR NDME<=50SQCM: CPT

## 2022-10-21 ENCOUNTER — APPOINTMENT (OUTPATIENT)
Dept: WOUND CARE | Facility: HOSPITAL | Age: 87
End: 2022-10-21

## 2022-10-21 PROCEDURE — 97607 NEG PRS WND THR NDME<=50SQCM: CPT

## 2022-10-26 ENCOUNTER — APPOINTMENT (OUTPATIENT)
Dept: WOUND CARE | Facility: HOSPITAL | Age: 87
End: 2022-10-26

## 2022-10-26 PROCEDURE — 97607 NEG PRS WND THR NDME<=50SQCM: CPT

## 2022-11-02 ENCOUNTER — APPOINTMENT (OUTPATIENT)
Dept: WOUND CARE | Facility: HOSPITAL | Age: 87
End: 2022-11-02

## 2022-11-02 PROCEDURE — 97607 NEG PRS WND THR NDME<=50SQCM: CPT

## 2022-11-07 ENCOUNTER — LAB REQUISITION (OUTPATIENT)
Dept: LAB | Facility: HOSPITAL | Age: 87
End: 2022-11-07

## 2022-11-07 DIAGNOSIS — D51.9 VITAMIN B12 DEFICIENCY ANEMIA, UNSPECIFIED: ICD-10-CM

## 2022-11-07 DIAGNOSIS — Z13.29 ENCOUNTER FOR SCREENING FOR OTHER SUSPECTED ENDOCRINE DISORDER: ICD-10-CM

## 2022-11-07 DIAGNOSIS — I50.20 UNSPECIFIED SYSTOLIC (CONGESTIVE) HEART FAILURE: ICD-10-CM

## 2022-11-07 DIAGNOSIS — I25.700 ATHEROSCLEROSIS OF CORONARY ARTERY BYPASS GRAFT(S), UNSPECIFIED, WITH UNSTABLE ANGINA PECTORIS: ICD-10-CM

## 2022-11-07 DIAGNOSIS — E55.9 VITAMIN D DEFICIENCY, UNSPECIFIED: ICD-10-CM

## 2022-11-07 DIAGNOSIS — I10 ESSENTIAL (PRIMARY) HYPERTENSION: ICD-10-CM

## 2022-11-07 DIAGNOSIS — R53.1 WEAKNESS: ICD-10-CM

## 2022-11-07 DIAGNOSIS — E78.5 HYPERLIPIDEMIA, UNSPECIFIED: ICD-10-CM

## 2022-11-07 DIAGNOSIS — R53.83 OTHER FATIGUE: ICD-10-CM

## 2022-11-07 LAB
ALBUMIN SERPL-MCNC: 4.1 G/DL (ref 3.5–5.2)
ALBUMIN/GLOB SERPL: 1.8 G/DL
ALP SERPL-CCNC: 96 U/L (ref 39–117)
ALT SERPL W P-5'-P-CCNC: 6 U/L (ref 1–33)
ANION GAP SERPL CALCULATED.3IONS-SCNC: 12.4 MMOL/L (ref 5–15)
AST SERPL-CCNC: 14 U/L (ref 1–32)
BILIRUB SERPL-MCNC: 0.3 MG/DL (ref 0–1.2)
BUN SERPL-MCNC: 40 MG/DL (ref 8–23)
BUN/CREAT SERPL: 18.7 (ref 7–25)
CALCIUM SPEC-SCNC: 8.8 MG/DL (ref 8.2–9.6)
CHLORIDE SERPL-SCNC: 102 MMOL/L (ref 98–107)
CHOLEST SERPL-MCNC: 125 MG/DL (ref 0–200)
CO2 SERPL-SCNC: 26.6 MMOL/L (ref 22–29)
CREAT SERPL-MCNC: 2.14 MG/DL (ref 0.57–1)
EGFRCR SERPLBLD CKD-EPI 2021: 21.3 ML/MIN/1.73
FOLATE SERPL-MCNC: 11.1 NG/ML (ref 4.78–24.2)
GLOBULIN UR ELPH-MCNC: 2.3 GM/DL
GLUCOSE SERPL-MCNC: 89 MG/DL (ref 65–99)
HDLC SERPL-MCNC: 49 MG/DL (ref 40–60)
LDLC SERPL CALC-MCNC: 54 MG/DL (ref 0–100)
LDLC/HDLC SERPL: 1.04 {RATIO}
MAGNESIUM SERPL-MCNC: 2.4 MG/DL (ref 1.7–2.3)
NT-PROBNP SERPL-MCNC: 659.7 PG/ML (ref 0–1800)
POTASSIUM SERPL-SCNC: 4.4 MMOL/L (ref 3.5–5.2)
PROT SERPL-MCNC: 6.4 G/DL (ref 6–8.5)
SODIUM SERPL-SCNC: 141 MMOL/L (ref 136–145)
TRIGL SERPL-MCNC: 126 MG/DL (ref 0–150)
TSH SERPL DL<=0.05 MIU/L-ACNC: 4.81 UIU/ML (ref 0.27–4.2)
VIT B12 BLD-MCNC: 301 PG/ML (ref 211–946)
VLDLC SERPL-MCNC: 22 MG/DL (ref 5–40)

## 2022-11-07 PROCEDURE — 80061 LIPID PANEL: CPT | Performed by: NURSE PRACTITIONER

## 2022-11-07 PROCEDURE — 82607 VITAMIN B-12: CPT | Performed by: NURSE PRACTITIONER

## 2022-11-07 PROCEDURE — 85007 BL SMEAR W/DIFF WBC COUNT: CPT | Performed by: NURSE PRACTITIONER

## 2022-11-07 PROCEDURE — 83880 ASSAY OF NATRIURETIC PEPTIDE: CPT | Performed by: NURSE PRACTITIONER

## 2022-11-07 PROCEDURE — 85025 COMPLETE CBC W/AUTO DIFF WBC: CPT | Performed by: NURSE PRACTITIONER

## 2022-11-07 PROCEDURE — 84443 ASSAY THYROID STIM HORMONE: CPT | Performed by: NURSE PRACTITIONER

## 2022-11-07 PROCEDURE — 83735 ASSAY OF MAGNESIUM: CPT | Performed by: NURSE PRACTITIONER

## 2022-11-07 PROCEDURE — 82746 ASSAY OF FOLIC ACID SERUM: CPT | Performed by: NURSE PRACTITIONER

## 2022-11-07 PROCEDURE — 80053 COMPREHEN METABOLIC PANEL: CPT | Performed by: NURSE PRACTITIONER

## 2022-11-08 ENCOUNTER — HOSPITAL ENCOUNTER (OUTPATIENT)
Facility: HOSPITAL | Age: 87
Setting detail: OBSERVATION
Discharge: HOME HEALTH | End: 2022-11-10
Attending: EMERGENCY MEDICINE | Admitting: HOSPITALIST

## 2022-11-08 ENCOUNTER — APPOINTMENT (OUTPATIENT)
Dept: GENERAL RADIOLOGY | Facility: HOSPITAL | Age: 87
End: 2022-11-08

## 2022-11-08 DIAGNOSIS — R19.5 OCCULT GI BLEEDING: Primary | ICD-10-CM

## 2022-11-08 DIAGNOSIS — D64.9 SYMPTOMATIC ANEMIA: ICD-10-CM

## 2022-11-08 LAB
ABO GROUP BLD: NORMAL
ALBUMIN SERPL-MCNC: 4 G/DL (ref 3.5–5.2)
ALBUMIN/GLOB SERPL: 1.9 G/DL
ALP SERPL-CCNC: 90 U/L (ref 39–117)
ALT SERPL W P-5'-P-CCNC: 7 U/L (ref 1–33)
ANION GAP SERPL CALCULATED.3IONS-SCNC: 8.9 MMOL/L (ref 5–15)
ANISOCYTOSIS BLD QL: NORMAL
ANISOCYTOSIS BLD QL: NORMAL
APTT PPP: 59.7 SECONDS (ref 24.3–38.1)
AST SERPL-CCNC: 13 U/L (ref 1–32)
BACTERIA UR QL AUTO: ABNORMAL /HPF
BASOPHILS # BLD AUTO: 0.03 10*3/MM3 (ref 0–0.2)
BASOPHILS # BLD AUTO: 0.03 10*3/MM3 (ref 0–0.2)
BASOPHILS NFR BLD AUTO: 0.3 % (ref 0–1.5)
BASOPHILS NFR BLD AUTO: 0.4 % (ref 0–1.5)
BILIRUB SERPL-MCNC: 0.3 MG/DL (ref 0–1.2)
BILIRUB UR QL STRIP: NEGATIVE
BLD GP AB SCN SERPL QL: NEGATIVE
BUN SERPL-MCNC: 36 MG/DL (ref 8–23)
BUN/CREAT SERPL: 17.2 (ref 7–25)
CALCIUM SPEC-SCNC: 8.8 MG/DL (ref 8.2–9.6)
CHLORIDE SERPL-SCNC: 105 MMOL/L (ref 98–107)
CLARITY UR: ABNORMAL
CO2 SERPL-SCNC: 28.1 MMOL/L (ref 22–29)
COLOR UR: ABNORMAL
CREAT SERPL-MCNC: 2.09 MG/DL (ref 0.57–1)
DEPRECATED RDW RBC AUTO: 78.5 FL (ref 37–54)
DEPRECATED RDW RBC AUTO: 80.2 FL (ref 37–54)
EGFRCR SERPLBLD CKD-EPI 2021: 21.9 ML/MIN/1.73
EOSINOPHIL # BLD AUTO: 0.2 10*3/MM3 (ref 0–0.4)
EOSINOPHIL # BLD AUTO: 0.24 10*3/MM3 (ref 0–0.4)
EOSINOPHIL NFR BLD AUTO: 2.2 % (ref 0.3–6.2)
EOSINOPHIL NFR BLD AUTO: 2.9 % (ref 0.3–6.2)
ERYTHROCYTE [DISTWIDTH] IN BLOOD BY AUTOMATED COUNT: 19.4 % (ref 12.3–15.4)
ERYTHROCYTE [DISTWIDTH] IN BLOOD BY AUTOMATED COUNT: 19.6 % (ref 12.3–15.4)
FERRITIN SERPL-MCNC: 84 NG/ML (ref 13–150)
GLOBULIN UR ELPH-MCNC: 2.1 GM/DL
GLUCOSE SERPL-MCNC: 102 MG/DL (ref 65–99)
GLUCOSE UR STRIP-MCNC: NEGATIVE MG/DL
HCT VFR BLD AUTO: 21.9 % (ref 34–46.6)
HCT VFR BLD AUTO: 23.1 % (ref 34–46.6)
HCT VFR BLD AUTO: 27.1 % (ref 34–46.6)
HGB BLD-MCNC: 6.6 G/DL (ref 12–15.9)
HGB BLD-MCNC: 6.7 G/DL (ref 12–15.9)
HGB BLD-MCNC: 8.2 G/DL (ref 12–15.9)
HGB UR QL STRIP.AUTO: ABNORMAL
HOLD SPECIMEN: NORMAL
HYALINE CASTS UR QL AUTO: ABNORMAL /LPF
HYPOCHROMIA BLD QL: NORMAL
IMM GRANULOCYTES # BLD AUTO: 0.02 10*3/MM3 (ref 0–0.05)
IMM GRANULOCYTES # BLD AUTO: 0.02 10*3/MM3 (ref 0–0.05)
IMM GRANULOCYTES NFR BLD AUTO: 0.2 % (ref 0–0.5)
IMM GRANULOCYTES NFR BLD AUTO: 0.2 % (ref 0–0.5)
INR PPP: 2.52 (ref 0.9–1.1)
IRON 24H UR-MRATE: 30 MCG/DL (ref 37–145)
IRON SATN MFR SERPL: 9 % (ref 20–50)
KETONES UR QL STRIP: NEGATIVE
LEUKOCYTE ESTERASE UR QL STRIP.AUTO: ABNORMAL
LYMPHOCYTES # BLD AUTO: 2.15 10*3/MM3 (ref 0.7–3.1)
LYMPHOCYTES # BLD AUTO: 2.9 10*3/MM3 (ref 0.7–3.1)
LYMPHOCYTES NFR BLD AUTO: 23.4 % (ref 19.6–45.3)
LYMPHOCYTES NFR BLD AUTO: 35.2 % (ref 19.6–45.3)
MACROCYTES BLD QL SMEAR: NORMAL
MACROCYTES BLD QL SMEAR: NORMAL
MCH RBC QN AUTO: 32.5 PG (ref 26.6–33)
MCH RBC QN AUTO: 33 PG (ref 26.6–33)
MCHC RBC AUTO-ENTMCNC: 29 G/DL (ref 31.5–35.7)
MCHC RBC AUTO-ENTMCNC: 30.1 G/DL (ref 31.5–35.7)
MCV RBC AUTO: 109.5 FL (ref 79–97)
MCV RBC AUTO: 112.1 FL (ref 79–97)
MONOCYTES # BLD AUTO: 0.92 10*3/MM3 (ref 0.1–0.9)
MONOCYTES # BLD AUTO: 0.94 10*3/MM3 (ref 0.1–0.9)
MONOCYTES NFR BLD AUTO: 10.2 % (ref 5–12)
MONOCYTES NFR BLD AUTO: 11.2 % (ref 5–12)
NEUTROPHILS NFR BLD AUTO: 4.13 10*3/MM3 (ref 1.7–7)
NEUTROPHILS NFR BLD AUTO: 5.84 10*3/MM3 (ref 1.7–7)
NEUTROPHILS NFR BLD AUTO: 50.1 % (ref 42.7–76)
NEUTROPHILS NFR BLD AUTO: 63.7 % (ref 42.7–76)
NITRITE UR QL STRIP: NEGATIVE
PH UR STRIP.AUTO: 6.5 [PH] (ref 4.5–8)
PLAT MORPH BLD: NORMAL
PLATELET # BLD AUTO: 298 10*3/MM3 (ref 140–450)
PLATELET # BLD AUTO: 313 10*3/MM3 (ref 140–450)
PMV BLD AUTO: 10.6 FL (ref 6–12)
PMV BLD AUTO: 11 FL (ref 6–12)
POLYCHROMASIA BLD QL SMEAR: NORMAL
POLYCHROMASIA BLD QL SMEAR: NORMAL
POTASSIUM SERPL-SCNC: 4.6 MMOL/L (ref 3.5–5.2)
PROT SERPL-MCNC: 6.1 G/DL (ref 6–8.5)
PROT UR QL STRIP: NEGATIVE
PROTHROMBIN TIME: 27.7 SECONDS (ref 12.1–15)
RBC # BLD AUTO: 2 10*6/MM3 (ref 3.77–5.28)
RBC # BLD AUTO: 2.06 10*6/MM3 (ref 3.77–5.28)
RBC # UR STRIP: ABNORMAL /HPF
REF LAB TEST METHOD: ABNORMAL
RETICS # AUTO: 0.27 10*6/MM3 (ref 0.02–0.13)
RETICS/RBC NFR AUTO: 13.53 % (ref 0.7–1.9)
RH BLD: NEGATIVE
SMALL PLATELETS BLD QL SMEAR: ADEQUATE
SODIUM SERPL-SCNC: 142 MMOL/L (ref 136–145)
SP GR UR STRIP: 1.01 (ref 1–1.03)
SQUAMOUS #/AREA URNS HPF: ABNORMAL /HPF
T&S EXPIRATION DATE: NORMAL
TIBC SERPL-MCNC: 344 MCG/DL (ref 298–536)
TSH SERPL DL<=0.05 MIU/L-ACNC: 5.51 UIU/ML (ref 0.27–4.2)
UIBC SERPL-MCNC: 314 MCG/DL (ref 112–346)
UROBILINOGEN UR QL STRIP: ABNORMAL
WBC # UR STRIP: ABNORMAL /HPF
WBC MORPH BLD: NORMAL
WBC MORPH BLD: NORMAL
WBC NRBC COR # BLD: 8.24 10*3/MM3 (ref 3.4–10.8)
WBC NRBC COR # BLD: 9.18 10*3/MM3 (ref 3.4–10.8)
WHOLE BLOOD HOLD COAG: NORMAL
WHOLE BLOOD HOLD SPECIMEN: NORMAL

## 2022-11-08 PROCEDURE — 86900 BLOOD TYPING SEROLOGIC ABO: CPT

## 2022-11-08 PROCEDURE — 25010000002 CYANOCOBALAMIN PER 1000 MCG: Performed by: INTERNAL MEDICINE

## 2022-11-08 PROCEDURE — 96376 TX/PRO/DX INJ SAME DRUG ADON: CPT

## 2022-11-08 PROCEDURE — 96372 THER/PROPH/DIAG INJ SC/IM: CPT

## 2022-11-08 PROCEDURE — 84443 ASSAY THYROID STIM HORMONE: CPT | Performed by: NURSE PRACTITIONER

## 2022-11-08 PROCEDURE — 96375 TX/PRO/DX INJ NEW DRUG ADDON: CPT

## 2022-11-08 PROCEDURE — G0378 HOSPITAL OBSERVATION PER HR: HCPCS

## 2022-11-08 PROCEDURE — 85025 COMPLETE CBC W/AUTO DIFF WBC: CPT

## 2022-11-08 PROCEDURE — 96366 THER/PROPH/DIAG IV INF ADDON: CPT

## 2022-11-08 PROCEDURE — 86850 RBC ANTIBODY SCREEN: CPT | Performed by: EMERGENCY MEDICINE

## 2022-11-08 PROCEDURE — 86920 COMPATIBILITY TEST SPIN: CPT

## 2022-11-08 PROCEDURE — 96365 THER/PROPH/DIAG IV INF INIT: CPT

## 2022-11-08 PROCEDURE — 99284 EMERGENCY DEPT VISIT MOD MDM: CPT

## 2022-11-08 PROCEDURE — 71046 X-RAY EXAM CHEST 2 VIEWS: CPT

## 2022-11-08 PROCEDURE — 85730 THROMBOPLASTIN TIME PARTIAL: CPT | Performed by: EMERGENCY MEDICINE

## 2022-11-08 PROCEDURE — 96368 THER/DIAG CONCURRENT INF: CPT

## 2022-11-08 PROCEDURE — 99220 PR INITIAL OBSERVATION CARE/DAY 70 MINUTES: CPT | Performed by: NURSE PRACTITIONER

## 2022-11-08 PROCEDURE — 86901 BLOOD TYPING SEROLOGIC RH(D): CPT | Performed by: EMERGENCY MEDICINE

## 2022-11-08 PROCEDURE — 80053 COMPREHEN METABOLIC PANEL: CPT

## 2022-11-08 PROCEDURE — 85045 AUTOMATED RETICULOCYTE COUNT: CPT | Performed by: INTERNAL MEDICINE

## 2022-11-08 PROCEDURE — 36430 TRANSFUSION BLD/BLD COMPNT: CPT

## 2022-11-08 PROCEDURE — 85610 PROTHROMBIN TIME: CPT | Performed by: EMERGENCY MEDICINE

## 2022-11-08 PROCEDURE — 85007 BL SMEAR W/DIFF WBC COUNT: CPT | Performed by: EMERGENCY MEDICINE

## 2022-11-08 PROCEDURE — 86900 BLOOD TYPING SEROLOGIC ABO: CPT | Performed by: EMERGENCY MEDICINE

## 2022-11-08 PROCEDURE — 81001 URINALYSIS AUTO W/SCOPE: CPT | Performed by: EMERGENCY MEDICINE

## 2022-11-08 PROCEDURE — 85018 HEMOGLOBIN: CPT | Performed by: INTERNAL MEDICINE

## 2022-11-08 PROCEDURE — 25010000002 NA FERRIC GLUC CPLX PER 12.5 MG: Performed by: INTERNAL MEDICINE

## 2022-11-08 PROCEDURE — 82728 ASSAY OF FERRITIN: CPT | Performed by: NURSE PRACTITIONER

## 2022-11-08 PROCEDURE — 83550 IRON BINDING TEST: CPT | Performed by: NURSE PRACTITIONER

## 2022-11-08 PROCEDURE — 99204 OFFICE O/P NEW MOD 45 MIN: CPT | Performed by: INTERNAL MEDICINE

## 2022-11-08 PROCEDURE — P9016 RBC LEUKOCYTES REDUCED: HCPCS

## 2022-11-08 PROCEDURE — 83540 ASSAY OF IRON: CPT | Performed by: NURSE PRACTITIONER

## 2022-11-08 PROCEDURE — 85014 HEMATOCRIT: CPT | Performed by: INTERNAL MEDICINE

## 2022-11-08 RX ORDER — ONDANSETRON 2 MG/ML
4 INJECTION INTRAMUSCULAR; INTRAVENOUS EVERY 6 HOURS PRN
Status: DISCONTINUED | OUTPATIENT
Start: 2022-11-08 | End: 2022-11-10 | Stop reason: HOSPADM

## 2022-11-08 RX ORDER — SODIUM CHLORIDE 9 MG/ML
INJECTION, SOLUTION INTRAVENOUS
Status: COMPLETED
Start: 2022-11-08 | End: 2022-11-08

## 2022-11-08 RX ORDER — SODIUM CHLORIDE 0.9 % (FLUSH) 0.9 %
10 SYRINGE (ML) INJECTION EVERY 12 HOURS SCHEDULED
Status: DISCONTINUED | OUTPATIENT
Start: 2022-11-08 | End: 2022-11-10 | Stop reason: HOSPADM

## 2022-11-08 RX ORDER — PANTOPRAZOLE SODIUM 40 MG/10ML
40 INJECTION, POWDER, LYOPHILIZED, FOR SOLUTION INTRAVENOUS ONCE
Status: COMPLETED | OUTPATIENT
Start: 2022-11-08 | End: 2022-11-08

## 2022-11-08 RX ORDER — CYANOCOBALAMIN 1000 UG/ML
1000 INJECTION, SOLUTION INTRAMUSCULAR; SUBCUTANEOUS DAILY
Status: COMPLETED | OUTPATIENT
Start: 2022-11-08 | End: 2022-11-10

## 2022-11-08 RX ORDER — ONDANSETRON 4 MG/1
4 TABLET, FILM COATED ORAL EVERY 6 HOURS PRN
Status: DISCONTINUED | OUTPATIENT
Start: 2022-11-08 | End: 2022-11-10 | Stop reason: HOSPADM

## 2022-11-08 RX ORDER — CHOLECALCIFEROL (VITAMIN D3) 125 MCG
5 CAPSULE ORAL NIGHTLY PRN
Status: DISCONTINUED | OUTPATIENT
Start: 2022-11-08 | End: 2022-11-10 | Stop reason: HOSPADM

## 2022-11-08 RX ORDER — NITROGLYCERIN 0.4 MG/1
0.4 TABLET SUBLINGUAL
Status: DISCONTINUED | OUTPATIENT
Start: 2022-11-08 | End: 2022-11-10

## 2022-11-08 RX ORDER — ACETAMINOPHEN 650 MG/1
650 SUPPOSITORY RECTAL EVERY 4 HOURS PRN
Status: DISCONTINUED | OUTPATIENT
Start: 2022-11-08 | End: 2022-11-10 | Stop reason: HOSPADM

## 2022-11-08 RX ORDER — SODIUM CHLORIDE 0.9 % (FLUSH) 0.9 %
10 SYRINGE (ML) INJECTION AS NEEDED
Status: DISCONTINUED | OUTPATIENT
Start: 2022-11-08 | End: 2022-11-10 | Stop reason: HOSPADM

## 2022-11-08 RX ORDER — ACETAMINOPHEN 325 MG/1
650 TABLET ORAL EVERY 4 HOURS PRN
Status: DISCONTINUED | OUTPATIENT
Start: 2022-11-08 | End: 2022-11-10 | Stop reason: HOSPADM

## 2022-11-08 RX ORDER — SODIUM CHLORIDE 9 MG/ML
40 INJECTION, SOLUTION INTRAVENOUS AS NEEDED
Status: DISCONTINUED | OUTPATIENT
Start: 2022-11-08 | End: 2022-11-10 | Stop reason: HOSPADM

## 2022-11-08 RX ORDER — ACETAMINOPHEN 160 MG/5ML
650 SOLUTION ORAL EVERY 4 HOURS PRN
Status: DISCONTINUED | OUTPATIENT
Start: 2022-11-08 | End: 2022-11-10 | Stop reason: HOSPADM

## 2022-11-08 RX ADMIN — ACETAMINOPHEN 650 MG: 325 TABLET ORAL at 23:29

## 2022-11-08 RX ADMIN — SODIUM CHLORIDE 500 ML: 9 INJECTION, SOLUTION INTRAVENOUS at 13:15

## 2022-11-08 RX ADMIN — PANTOPRAZOLE SODIUM 40 MG: 40 INJECTION, POWDER, FOR SOLUTION INTRAVENOUS at 11:52

## 2022-11-08 RX ADMIN — Medication 5 MG: at 23:29

## 2022-11-08 RX ADMIN — Medication 10 ML: at 13:15

## 2022-11-08 RX ADMIN — SODIUM CHLORIDE 125 MG: 9 INJECTION, SOLUTION INTRAVENOUS at 18:33

## 2022-11-08 RX ADMIN — Medication 10 ML: at 20:31

## 2022-11-08 RX ADMIN — MICONAZOLE NITRATE: 20 POWDER TOPICAL at 20:31

## 2022-11-08 RX ADMIN — SODIUM CHLORIDE 8 MG/HR: 900 INJECTION INTRAVENOUS at 20:31

## 2022-11-08 RX ADMIN — SODIUM CHLORIDE 8 MG/HR: 900 INJECTION INTRAVENOUS at 15:49

## 2022-11-08 RX ADMIN — CYANOCOBALAMIN 1000 MCG: 1000 INJECTION, SOLUTION INTRAMUSCULAR; SUBCUTANEOUS at 18:34

## 2022-11-08 NOTE — ED TRIAGE NOTES
Pt to ED via PV. Pt c/o generalized weakness and SOB that started 10/26. Pt states labs drawn yesterday and pt with low hgb. Pt told to come to ED for eval. Pt appears pale. Pt on xarelto.

## 2022-11-08 NOTE — CASE MANAGEMENT/SOCIAL WORK
Continued Stay Note  KALA Lu     Patient Name: Aria Fernando  MRN: [id number]  Today's Date: 11/8/2022    Admit Date: 11/8/2022        Discharge Plan     Row Name 11/08/22 1439       Plan    Plan Comments Call received from Roxie with [facility] and she states they are current with patient for . CM will follow.               Discharge Codes    No documentation.                     Lucy Barry RN

## 2022-11-08 NOTE — NURSING NOTE
CWON consult received. Patient admitted to observation 2° to gi bleed. She is receiving blood at time of my visit.  She is currently being followed in our wound care center for a chronic right knee wound that originally occurred due to trauma back in May of this year. She is seen weekly in the [facility] with her next appointment scheduled for tomorrow. Most recent treatment to right knee wound includesHydrofera Blue followed by disposable NPWT. She arrived to hosp with dressing intact but not connected to the small box/pump. She said it stopped working yesterday so she disconnected the tubing.     CWON recommends removing dressing and placing normal saline moistened gauze to wound bed and securing with a dry dressing. To remain on schedule, we will plan to place a new MACI npwt dressing tomorrow but will discuss with NESSA Lopez in the [facility] in am prior to treatment. I did attempt to walk down to the wound center to discuss pt in more detail, but they had left for the day.    Update provided to NESSA Alcantara.    Will continue to follow.

## 2022-11-08 NOTE — NURSING NOTE
Spoke with Leena at wound clinic. Notified that patient was here inpatient. Stated she would notified the nurses and take her off the schedule for tomorrow. Stated to notify the office when she is discharged so they can put her back on the schedule.

## 2022-11-08 NOTE — H&P
[facility] HOSPITALIST     Jimena Aj MD    CHIEF COMPLAINT: generalized weakness x 2 weeks    HISTORY OF PRESENT ILLNESS:  The patient is a 89-year-old female who presented to the emergency department with her daughter from assisted living, secondary to 2 weeks of generalized weakness, mild shortness of air with exertion.  She notes having blood drawn yesterday noting she was anemic and was told to come to ER today.  She notes stools have been dark however attributed this to taking iron. She denies bloody stools and notes she has a great appetite and no pain concerns. She reports that she was doing extremely well, continues with right knee area wound VAC managed by wound clinic at this facility.  Daughter reports she was nearly to be released from wound care and patient reports absolutely no sick symptoms whatsoever recently.    Diagnostics in ER showed grossly positive Hemoccult stool, creatinine 2.09, hemoglobin 6.7.  She was given IV fluid bolus and single dose of Protonix.  1 unit PRBCs has been ordered.    She has a history of Atrial fibrillation, DVT on xarelto h/o dCHF, CAD/HLD, HTN, CKD, mesenteric thrombosis, colitis, PVD, OA, GERD, chronic right knee pain with gait instability, GIB on warfarin and xarelto in past, severe left ICA stenosis s/p left CEA, right leg hematoma.    She otherwise denies f/c/headache/rhinorrhea/nasal congestion/lightheadedness/syncopal sensation/cough/n/v/d/chest pain/abdominal pain/recent illness/sick exposures/change in bowel or bladder habits/no weight change/bloody emesis or bloody stools/change in medications or any other new concerns.    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)    • On anticoagulant therapy    • Osteoarthritis    • Polycythemia    • PVD (peripheral vascular disease) (HCC)      Past Surgical History:   Procedure Laterality Date   • APPENDECTOMY     • CAROTID ENDARTERECTOMY Left    • COLONOSCOPY N/A 03/03/2018    Procedure:  Colonoscopy to Terminal ileum with APC treatment for 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 and cold biopsy;  Surgeon: Terrie Carroll MD;  Location: [facility] ENDOSCOPY;  Service:    • ENDOSCOPY N/A 03/03/2018    Procedure: EGD with biopsy;  Surgeon: Terrie Carroll MD;  Location: [facility] ENDOSCOPY;  Service:    • KNEE ARTHROSCOPY Right      History reviewed. No pertinent family history.  Social History     Tobacco Use   • Smoking status: Never   • Smokeless tobacco: Never   Vaping Use   • Vaping Use: Never used   Substance Use Topics   • Alcohol use: No   • Drug use: No     Medications Prior to Admission   Medication Sig Dispense Refill Last Dose   • alendronate (FOSAMAX) 70 MG tablet Take 70 mg by mouth Every 7 (Seven) Days.   Past Week   • amiodarone (PACERONE) 200 MG tablet Take 200 mg by mouth Daily.   11/8/2022   • amLODIPine (NORVASC) 5 MG tablet Take 5 mg by mouth Daily.   11/8/2022   • aspirin 81 MG chewable tablet Chew 81 mg Daily.   11/8/2022   • Calcium Carb-Cholecalciferol (OYSTER SHELL CALCIUM/VITAMIN D) 250-125 MG-UNIT tablet tablet Take 2 tablets by mouth 2 (Two) Times a Day.   11/8/2022   • cholecalciferol (VITAMIN D3) 400 units tablet Take 400 Units by mouth 2 (Two) Times a Day.   11/8/2022   • docusate sodium (COLACE) 100 MG capsule Take 1 capsule by mouth 2 (Two) Times a Day As Needed.   Patient Taking Differently   • ferrous sulfate 325 (65 FE) MG tablet Take 325 mg by mouth Daily With Breakfast.   11/8/2022   • gabapentin (NEURONTIN) 100 MG capsule Take 100 mg by mouth 2 (Two) Times a Day.   11/8/2022   • metoprolol tartrate (LOPRESSOR) 25 MG tablet Take 12.5 mg by mouth 2 (Two)  "Times a Day.   11/8/2022   • mirtazapine (REMERON) 15 MG tablet Take 15 mg by mouth Every Night.   11/7/2022   • omeprazole (priLOSEC) 20 MG capsule Take 20 mg by mouth Daily.   11/8/2022   • rosuvastatin (CRESTOR) 5 MG tablet Take 5 mg by mouth Daily.   11/8/2022   • acetaminophen (TYLENOL) 325 MG tablet Take 2 tablets by mouth Every 4 (Four) Hours As Needed for Mild Pain .      • doxycycline 100 mg in sodium chloride 0.9 % 100 mL IVPB Infuse 100 mg into a venous catheter 2 (Two) Times a Day. (Patient not taking: Reported on 11/8/2022)   Not Taking   • Morphine (MSIR) 15 MG tablet  (Patient not taking: Reported on 11/8/2022)   Not Taking   • ondansetron (ZOFRAN) 4 MG tablet Take 4 mg by mouth Every 4 (Four) Hours As Needed for Nausea or Vomiting.      • vancomycin (VANCOCIN) 1 g injection       • venlafaxine (EFFEXOR) 75 MG tablet  (Patient not taking: Reported on 11/8/2022)   Not Taking   • Xarelto 20 MG tablet 1 tablet Daily.        Allergies:  Clinoril [sulindac], Codeine, Coumadin [warfarin sodium], Ibuprofen-oxycodone [oxycodone-ibuprofen], Keflex [cephalexin], Mydriacyl [tropicamide], Don-synephrine [phenylephrine], Penicillins, Phenylephrine hcl, Sulfa antibiotics, and Zantac [ranitidine hcl]    Immunization History   Administered Date(s) Administered   • COVID-19 (PFIZER) PURPLE CAP 01/06/2021, 01/27/2021, 12/09/2021, 07/14/2022   • Influenza, Unspecified 10/31/2017   • Tdap 11/11/2021       REVIEW OF SYSTEMS:  Please see the above history of present illness for pertinent positives and negatives.  The remainder of the patient's systems have been reviewed and are negative.     Vital Signs  Temp:  [97.6 °F (36.4 °C)-97.8 °F (36.6 °C)] 97.8 °F (36.6 °C)  Heart Rate:  [60-65] 65  Resp:  [18] 18  BP: ()/(45-85) 106/49   Body mass index is 31.52 kg/m².    Flowsheet Rows    Flowsheet Row First Filed Value   Admission Height 154.9 cm (61\") Documented at 11/08/2022 1027   Admission Weight 79.9 kg (176 lb 1.6 " oz) Documented at 11/08/2022 1027           Physical Exam  Vitals reviewed.   Constitutional:       General: She is not in acute distress.     Appearance: She is obese.      Comments: Pale, elderly   HENT:      Head: Normocephalic and atraumatic.      Mouth/Throat:      Mouth: Mucous membranes are moist.      Comments: Edentulous  Eyes:      Extraocular Movements: Extraocular movements intact.      Pupils: Pupils are equal, round, and reactive to light.   Cardiovascular:      Rate and Rhythm: Normal rate and regular rhythm.   Pulmonary:      Effort: Pulmonary effort is normal. No respiratory distress.      Breath sounds: Normal breath sounds. No wheezing or rales.   Abdominal:      General: Abdomen is flat. Bowel sounds are normal. There is no distension.      Palpations: Abdomen is soft.      Tenderness: There is no abdominal tenderness. There is no guarding.   Musculoskeletal:      Comments: Right knee with wound VAC dressing in place, healing ecchymosis surrounding knee area   Skin:     General: Skin is warm and dry.      Capillary Refill: Capillary refill takes less than 2 seconds.      Coloration: Skin is pale.      Findings: No erythema.   Neurological:      General: No focal deficit present.      Mental Status: She is alert and oriented to person, place, and time.   Psychiatric:         Mood and Affect: Mood normal.         Behavior: Behavior normal.       Emotional Behavior:    Judgement and Insight: Good   Mental Status:  Alertness alert   Memory: Good   Mood and Affect:         Depression none               Anxiety none    Debilities:   Physical Weakness secondary to right knee hematoma/wound VAC and blood loss   Handicaps  unknown   Disabilities  unknown   Agitation  none     Results Review:    I reviewed the patient's new clinical results.  Lab Results (most recent)     Procedure Component Value Units Date/Time    aPTT [[id number]]  (Abnormal) Collected: 11/08/22 1040    Specimen: Blood Updated: 11/08/22  1131     PTT 59.7 seconds     Narrative:      PTT = The equivalent PTT values for the therapeutic range of heparin levels at 0.1 to 0.7 U/ml are 53 to 110 seconds.      Protime-INR [[id number]]  (Abnormal) Collected: 11/08/22 1040    Specimen: Blood Updated: 11/08/22 1131     Protime 27.7 Seconds      INR 2.52    Narrative:      Therapeutic Ranges for INR: 2.0-3.0 (PT 20-30)                              2.5-3.5 (PT 25-34)    Comprehensive Metabolic Panel [[id number]]  (Abnormal) Collected: 11/08/22 1040    Specimen: Blood Updated: 11/08/22 1113     Glucose 102 mg/dL      BUN 36 mg/dL      Creatinine 2.09 mg/dL      Sodium 142 mmol/L      Potassium 4.6 mmol/L      Chloride 105 mmol/L      CO2 28.1 mmol/L      Calcium 8.8 mg/dL      Total Protein 6.1 g/dL      Albumin 4.00 g/dL      ALT (SGPT) 7 U/L      AST (SGOT) 13 U/L      Alkaline Phosphatase 90 U/L      Total Bilirubin 0.3 mg/dL      Globulin 2.1 gm/dL      A/G Ratio 1.9 g/dL      BUN/Creatinine Ratio 17.2     Anion Gap 8.9 mmol/L      eGFR 21.9 mL/min/1.73      Comment: National Kidney Foundation and American Society of Nephrology (ASN) Task Force recommended calculation based on the Chronic Kidney Disease Epidemiology Collaboration (CKD-EPI) equation refit without adjustment for race.       Narrative:      GFR Normal >60  Chronic Kidney Disease <60  Kidney Failure <15    The GFR formula is only valid for adults with stable renal function between ages 18 and 70.    CBC & Differential [[id number]]  (Abnormal) Collected: 11/08/22 1040    Specimen: Blood Updated: 11/08/22 1054    Narrative:      The following orders were created for panel order CBC & Differential.  Procedure                               Abnormality         Status                     ---------                               -----------         ------                     CBC Auto Differential[[id number]]        Abnormal            Final result               Scan Slide[[id number]]                                        In process                   Please view results for these tests on the individual orders.    CBC Auto Differential [[id number]]  (Abnormal) Collected: 11/08/22 1040    Specimen: Blood Updated: 11/08/22 1054     WBC 9.18 10*3/mm3      RBC 2.06 10*6/mm3      Hemoglobin 6.7 g/dL      Hematocrit 23.1 %      .1 fL      MCH 32.5 pg      MCHC 29.0 g/dL      RDW 19.4 %      RDW-SD 80.2 fl      MPV 10.6 fL      Platelets 298 10*3/mm3      Neutrophil % 63.7 %      Lymphocyte % 23.4 %      Monocyte % 10.2 %      Eosinophil % 2.2 %      Basophil % 0.3 %      Immature Grans % 0.2 %      Neutrophils, Absolute 5.84 10*3/mm3      Lymphocytes, Absolute 2.15 10*3/mm3      Monocytes, Absolute 0.94 10*3/mm3      Eosinophils, Absolute 0.20 10*3/mm3      Basophils, Absolute 0.03 10*3/mm3      Immature Grans, Absolute 0.02 10*3/mm3     Scan Slide [[id number]] Collected: 11/08/22 1040    Specimen: Blood Updated: 11/08/22 1048    Green Top (Gel) [[id number]] Collected: 11/08/22 1040    Specimen: Blood Updated: 11/08/22 1045    Light Blue Top [[id number]] Collected: 11/08/22 1040    Specimen: Blood Updated: 11/08/22 1045    [facility] Draw [[id number]] Collected: 11/08/22 1040    Specimen: Blood Updated: 11/08/22 1045    Narrative:      The following orders were created for panel order [facility] Draw.  Procedure                               Abnormality         Status                     ---------                               -----------         ------                     Green Top (Gel)[[id number]]                                  In process                 Lavender Top[[id number]]                                     In process                 Gold Top - SST[[id number]]                                                              Light Blue Top[[id number]]                                   In process                   Please view results for these tests on the individual orders.    Lavender Top [[id number]] Collected: 11/08/22 1040     Specimen: Blood Updated: 11/08/22 1045          Imaging Results (Most Recent)     Procedure Component Value Units Date/Time    XR Chest 2 View [[id number]] Collected: 11/08/22 1159     Updated: 11/08/22 1204    Narrative:      CHEST X-RAY, 11/8/2022         HISTORY:  89-year-old female in the ED complaining of one to two week history of  fatigue, weakness. Low hemoglobin.     TECHNIQUE:  PA and lateral upright chest series.     COMPARISON:  *  Chest x-ray, 5/26/2022.     FINDINGS:  Mild cardiomegaly is stable. Pulmonary vascularity is normal. Mild  basilar atelectasis. No visible airspace consolidation. Lateral image  shows likely tiny posterior pleural effusions.       Impression:      1. Stable mild cardiomegaly.  2. Suspected tiny posterior pleural effusions and mild basilar  atelectasis. Lungs otherwise clear.     This report was finalized on 11/8/2022 12:02 PM by Dr. Jose Alba MD.           reviewed    ECG/EMG Results (most recent)     None        Pending     Assessment & Plan    Acute GIB on xarelto  H/O colonic AVMs:  GERD: Consult GI  Allow clears  Transfuse PRBCs, likely 2 units  Hold Xarelto  Check anemia panels  Monitor    MARTA on CKD stage 3:   Previous baseline creatinine approximately 1.4, currently 2.09, yesterday 2.14  Recheck in a.m.    Right knee wound VAC:  Requested staff contact wound clinic to advise regarding need for wound VAC while here    Permanent a-fib:  CAD/HLD:  HTN:  ?h/O CHF:   Last echo with normal EF, generally unremarkable  Currently NSR on my exam, HRRR  BP at goal  Add home metoprolol tartrate as BP will allow  Add home amiodarone, check TSH  Hold home amlodipine    Severe left ICA stenosis s/p left CEA  PVD:  H/O mesenteric thrombosis:  No current acute issues    H/O DVT: last venous duplex 5/2022 negative for DVT  No current acute issues    OA:   Chronic right knee pain with chronic gait instability:  Right knee with wound VAC, as above  Falls precautions    I  "discussed the patient's findings and my recommendations with patient and daughter and staff.     Heidi MARINASierra Call, APRN  11/08/22  13:53 EST    \"Dictated utilizing Dragon dictation\"    Note disclaimer: At [facility], we believe that sharing information builds trust and better relationships. You are receiving this note because you recently visited [facility]. It is possible you will see health information before a provider has talked with you about it. This kind of information can be easy to misunderstand. To help you fully understand what it means for your health, we urge you to discuss this note with your provider.        "

## 2022-11-08 NOTE — CONSULTS
Patient Care Team:  Jimena Aj MD as PCP - General (Family Medicine)    CHIEF COMPLAINT: Anemia    HISTORY OF PRESENT ILLNESS:  89 yo presents due to symptomatic anemia, she has history of DVT and LICS/CEA and is maintained on AC, she also has an active wound that was ind=fected and bleeding recently but has been improving with active wound care. She denies seeing any active bleeding but has had this presentation before and was actually scoped in 2018 by Dr Carroll but the findings were minimal. Presently she has a low iron saturation but also a low B12 and elevated MCV (112). She has received a unit today and will await her repeat labs but feel this 3 gram drop from her baseline of 9 is subacute/ multifactorial. With out an obvious active bleed would avoid putting her through endoscopy.     As per Taylor...The patient is a 89-year-old female who presented to the emergency department with her daughter from assisted living, secondary to 2 weeks of generalized weakness, mild shortness of air with exertion.  She notes having blood drawn yesterday noting she was anemic and was told to come to ER today.  She notes stools have been dark however attributed this to taking iron. She denies bloody stools and notes she has a great appetite and no pain concerns. She reports that she was doing extremely well, continues with right knee area wound VAC managed by wound clinic at this facility.  Daughter reports she was nearly to be released from wound care and patient reports absolutely no sick symptoms whatsoever recently.     Diagnostics in ER showed grossly positive Hemoccult stool, creatinine 2.09, hemoglobin 6.7.  She was given IV fluid bolus and single dose of Protonix.  1 unit PRBCs has been ordered.     She has a history of Atrial fibrillation, DVT on xarelto h/o dCHF, CAD/HLD, HTN, CKD, mesenteric thrombosis, colitis, PVD, OA, GERD, chronic right knee pain with gait instability, GIB on warfarin and xarelto in past,  severe left ICA stenosis s/p left CEA, right leg hematoma..    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)    • On anticoagulant therapy    • Osteoarthritis    • Polycythemia    • PVD (peripheral vascular disease) (HCC)      Past Surgical History:   Procedure Laterality Date   • APPENDECTOMY     • CAROTID ENDARTERECTOMY Left    • COLONOSCOPY N/A 03/03/2018    Procedure:  Colonoscopy to Terminal ileum with APC treatment for 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 and cold biopsy;  Surgeon: Terrie Carroll MD;  Location: [facility] ENDOSCOPY;  Service:    • ENDOSCOPY N/A 03/03/2018    Procedure: EGD with biopsy;  Surgeon: Terrie Carroll MD;  Location: [facility] ENDOSCOPY;  Service:    • KNEE ARTHROSCOPY Right      History reviewed. No pertinent family history.  Social History     Tobacco Use   • Smoking status: Never   • Smokeless tobacco: Never   Vaping Use   • Vaping Use: Never used   Substance Use Topics   • Alcohol use: No   • Drug use: No     Medications Prior to Admission   Medication Sig Dispense Refill Last Dose   • alendronate (FOSAMAX) 70 MG tablet Take 70 mg by mouth Every 7 (Seven) Days.   Past Week   • amiodarone (PACERONE) 200 MG tablet Take 200 mg by mouth Daily.   11/8/2022   • amLODIPine (NORVASC) 5 MG tablet Take 5 mg by mouth Daily.   11/8/2022   • aspirin 81 MG chewable tablet Chew 81 mg Daily.   11/8/2022   • Calcium Carb-Cholecalciferol (OYSTER SHELL CALCIUM/VITAMIN D) 250-125 MG-UNIT tablet tablet Take 2 tablets by mouth 2 (Two) Times a Day.   11/8/2022   • cholecalciferol (VITAMIN D3) 400 units tablet Take 400 Units by mouth 2 (Two) Times a Day.   11/8/2022   • docusate sodium  (COLACE) 100 MG capsule Take 1 capsule by mouth 2 (Two) Times a Day As Needed.   Patient Taking Differently   • ferrous sulfate 325 (65 FE) MG tablet Take 325 mg by mouth Daily With Breakfast.   11/8/2022   • gabapentin (NEURONTIN) 100 MG capsule Take 100 mg by mouth 2 (Two) Times a Day.   11/8/2022   • metoprolol tartrate (LOPRESSOR) 25 MG tablet Take 12.5 mg by mouth 2 (Two) Times a Day.   11/8/2022   • mirtazapine (REMERON) 15 MG tablet Take 15 mg by mouth Every Night.   11/7/2022   • omeprazole (priLOSEC) 20 MG capsule Take 20 mg by mouth Daily.   11/8/2022   • rosuvastatin (CRESTOR) 5 MG tablet Take 5 mg by mouth Daily.   11/8/2022   • acetaminophen (TYLENOL) 325 MG tablet Take 2 tablets by mouth Every 4 (Four) Hours As Needed for Mild Pain .      • doxycycline 100 mg in sodium chloride 0.9 % 100 mL IVPB Infuse 100 mg into a venous catheter 2 (Two) Times a Day. (Patient not taking: Reported on 11/8/2022)   Not Taking   • Morphine (MSIR) 15 MG tablet  (Patient not taking: Reported on 11/8/2022)   Not Taking   • ondansetron (ZOFRAN) 4 MG tablet Take 4 mg by mouth Every 4 (Four) Hours As Needed for Nausea or Vomiting.      • vancomycin (VANCOCIN) 1 g injection       • venlafaxine (EFFEXOR) 75 MG tablet  (Patient not taking: Reported on 11/8/2022)   Not Taking   • Xarelto 20 MG tablet 1 tablet Daily.        Allergies:  Clinoril [sulindac], Codeine, Coumadin [warfarin sodium], Ibuprofen-oxycodone [oxycodone-ibuprofen], Keflex [cephalexin], Mydriacyl [tropicamide], Don-synephrine [phenylephrine], Penicillins, Phenylephrine hcl, Sulfa antibiotics, and Zantac [ranitidine hcl]    REVIEW OF SYSTEMS:  Please see the above history of present illness for pertinent positives and negatives.  The remainder of the patient's systems have been reviewed and are negative.     Vital Signs  Temp:  [97.1 °F (36.2 °C)-97.8 °F (36.6 °C)] 97.1 °F (36.2 °C)  Heart Rate:  [59-65] 60  Resp:  [18] 18  BP: ()/(45-85) 108/48    Flowsheet  "Rows    Flowsheet Row First Filed Value   Admission Height 154.9 cm (61\") Documented at 11/08/2022 1027   Admission Weight 79.9 kg (176 lb 1.6 oz) Documented at 11/08/2022 1027           Physical Exam:  Physical Exam   Constitutional: Patient appears well-developed and well-nourished and in no acute distress   HEENT:   Head: Normocephalic and atraumatic.   Eyes:  Pupils are equal, round, and reactive to light. EOM are intact. Sclerae are anicteric and non-injected.  Mouth and Throat: Patient has moist mucous membranes. Oropharynx is clear of any erythema or exudate.     Neck: Neck supple. No JVD present. No thyromegaly present. No lymphadenopathy present.  Cardiovascular: Regular rate, regular rhythm, S1 normal and S2 normal.  Exam reveals no gallop and no friction rub.  No murmur heard.  Pulmonary/Chest: Lungs are clear to auscultation bilaterally. No respiratory distress. No wheezes. No rhonchi. No rales.   Abdominal: Soft. Bowel sounds are normal. No distension and no mass. There is no hepatosplenomegaly. There is NO tenderness.   Musculoskeletal: Normal Muscle tone  Extremities: No edema. Pulses are palpable in all 4 extremities.  Neurological: Patient is alert and oriented to person, place, and time. Cranial nerves II-XII are grossly intact with no focal deficits.  Skin: Skin is warm. No rash noted. Nails show no clubbing.  No cyanosis or erythema.    Debilities/Disabilities Identified: None  Emotional Behavior: Appropriate     Results Review:   I reviewed the patient's new clinical results.    Lab Results (most recent)     Procedure Component Value Units Date/Time    TSH [[id number]]  (Abnormal) Collected: 11/08/22 1040    Specimen: Blood Updated: 11/08/22 1609     TSH 5.510 uIU/mL     Iron Profile [[id number]]  (Abnormal) Collected: 11/08/22 1040    Specimen: Blood Updated: 11/08/22 1420     Iron 30 mcg/dL      Iron Saturation 9 %      TIBC 344 mcg/dL      UIBC 314 mcg/dL     Ferritin [[id number]]  (Normal) " Collected: 11/08/22 1040    Specimen: Blood Updated: 11/08/22 1420     Ferritin 84.00 ng/mL     Narrative:      Results may be falsely decreased if patient taking Biotin.      Urinalysis, Microscopic Only - Urine, Clean Catch [[id number]]  (Abnormal) Collected: 11/08/22 1159    Specimen: Urine, Clean Catch Updated: 11/08/22 1325     RBC, UA None Seen /HPF      WBC, UA 3-5 /HPF      Bacteria, UA 3+ /HPF      Squamous Epithelial Cells, UA 0-2 /HPF      Hyaline Casts, UA None Seen /LPF      Methodology Manual Light Microscopy    Urinalysis With Microscopic If Indicated (No Culture) - Urine, Clean Catch [[id number]]  (Abnormal) Collected: 11/08/22 1159    Specimen: Urine, Clean Catch Updated: 11/08/22 1319     Color, UA Other     Appearance, UA Slightly Cloudy     pH, UA 6.5     Specific Gravity, UA 1.010     Glucose, UA Negative     Ketones, UA Negative     Bilirubin, UA Negative     Blood, UA Trace     Protein, UA Negative     Leuk Esterase, UA Small (1+)     Nitrite, UA Negative     Urobilinogen, UA 0.2 E.U./dL    CBC & Differential [[id number]]  (Abnormal) Collected: 11/08/22 1040    Specimen: Blood Updated: 11/08/22 1208    Narrative:      The following orders were created for panel order CBC & Differential.  Procedure                               Abnormality         Status                     ---------                               -----------         ------                     CBC Auto Differential[[id number]]        Abnormal            Final result               Scan Slide[[id number]]                                       Final result                 Please view results for these tests on the individual orders.    Scan Slide [[id number]] Collected: 11/08/22 1040    Specimen: Blood Updated: 11/08/22 1208     Anisocytosis Slight/1+     Macrocytes Slight/1+     Polychromasia Slight/1+     WBC Morphology Normal     Platelet Morphology Normal    [place] Draw [[id number]] Collected: 11/08/22 1040    Specimen: Blood Updated:  11/08/22 1145    Narrative:      The following orders were created for panel order [place] Draw.  Procedure                               Abnormality         Status                     ---------                               -----------         ------                     Green Top (Gel)[[id number]]                                  Final result               Lavender Top[[id number]]                                     Final result               Gold Top - SST[[id number]]                                                              Light Blue Top[[id number]]                                   Final result                 Please view results for these tests on the individual orders.    Green Top (Gel) [[id number]] Collected: 11/08/22 1040    Specimen: Blood Updated: 11/08/22 1145     Extra Tube Hold for add-ons.     Comment: Auto resulted.       Light Blue Top [[id number]] Collected: 11/08/22 1040    Specimen: Blood Updated: 11/08/22 1145     Extra Tube Hold for add-ons.     Comment: Auto resulted       Lavender Top [[id number]] Collected: 11/08/22 1040    Specimen: Blood Updated: 11/08/22 1145     Extra Tube hold for add-on     Comment: Auto resulted       aPTT [[id number]]  (Abnormal) Collected: 11/08/22 1040    Specimen: Blood Updated: 11/08/22 1131     PTT 59.7 seconds     Narrative:      PTT = The equivalent PTT values for the therapeutic range of heparin levels at 0.1 to 0.7 U/ml are 53 to 110 seconds.      Protime-INR [[id number]]  (Abnormal) Collected: 11/08/22 1040    Specimen: Blood Updated: 11/08/22 1131     Protime 27.7 Seconds      INR 2.52    Narrative:      Therapeutic Ranges for INR: 2.0-3.0 (PT 20-30)                              2.5-3.5 (PT 25-34)    Comprehensive Metabolic Panel [[id number]]  (Abnormal) Collected: 11/08/22 1040    Specimen: Blood Updated: 11/08/22 1113     Glucose 102 mg/dL      BUN 36 mg/dL      Creatinine 2.09 mg/dL      Sodium 142 mmol/L      Potassium 4.6 mmol/L      Chloride 105 mmol/L       CO2 28.1 mmol/L      Calcium 8.8 mg/dL      Total Protein 6.1 g/dL      Albumin 4.00 g/dL      ALT (SGPT) 7 U/L      AST (SGOT) 13 U/L      Alkaline Phosphatase 90 U/L      Total Bilirubin 0.3 mg/dL      Globulin 2.1 gm/dL      A/G Ratio 1.9 g/dL      BUN/Creatinine Ratio 17.2     Anion Gap 8.9 mmol/L      eGFR 21.9 mL/min/1.73      Comment: National Kidney Foundation and American Society of Nephrology (ASN) Task Force recommended calculation based on the Chronic Kidney Disease Epidemiology Collaboration (CKD-EPI) equation refit without adjustment for race.       Narrative:      GFR Normal >60  Chronic Kidney Disease <60  Kidney Failure <15    The GFR formula is only valid for adults with stable renal function between ages 18 and 70.    CBC Auto Differential [[id number]]  (Abnormal) Collected: 11/08/22 1040    Specimen: Blood Updated: 11/08/22 1054     WBC 9.18 10*3/mm3      RBC 2.06 10*6/mm3      Hemoglobin 6.7 g/dL      Hematocrit 23.1 %      .1 fL      MCH 32.5 pg      MCHC 29.0 g/dL      RDW 19.4 %      RDW-SD 80.2 fl      MPV 10.6 fL      Platelets 298 10*3/mm3      Neutrophil % 63.7 %      Lymphocyte % 23.4 %      Monocyte % 10.2 %      Eosinophil % 2.2 %      Basophil % 0.3 %      Immature Grans % 0.2 %      Neutrophils, Absolute 5.84 10*3/mm3      Lymphocytes, Absolute 2.15 10*3/mm3      Monocytes, Absolute 0.94 10*3/mm3      Eosinophils, Absolute 0.20 10*3/mm3      Basophils, Absolute 0.03 10*3/mm3      Immature Grans, Absolute 0.02 10*3/mm3           Imaging Results (Most Recent)     Procedure Component Value Units Date/Time    XR Chest 2 View [[id number]] Collected: 11/08/22 1159     Updated: 11/08/22 1204    Narrative:      CHEST X-RAY, 11/8/2022         HISTORY:  89-year-old female in the ED complaining of one to two week history of  fatigue, weakness. Low hemoglobin.     TECHNIQUE:  PA and lateral upright chest series.     COMPARISON:  *  Chest x-ray, 5/26/2022.     FINDINGS:  Mild cardiomegaly  is stable. Pulmonary vascularity is normal. Mild  basilar atelectasis. No visible airspace consolidation. Lateral image  shows likely tiny posterior pleural effusions.       Impression:      1. Stable mild cardiomegaly.  2. Suspected tiny posterior pleural effusions and mild basilar  atelectasis. Lungs otherwise clear.     This report was finalized on 11/8/2022 12:02 PM by Dr. Jose Alba MD.           reviewed    ECG/EMG Results (most recent)     None        reviewed    Assessment & Plan   Anemia   Coagulopathy  RLE wound  Heme positive Stool  B12 deficiency  Iron Deficiency  PAD  AFIB/DVT    DUe to multiple comorbidities and her apparent multifactorial anemia will replace Iron , B12 and hold AC for now but do not feel the risk of sedation and endoscopy outweighs the risk at this time. WIll subsequently increase her diet and follow along.        I discussed the patient's findings and my recommendations with patient.     Emiliano Rodriguez MD  11/08/22  17:20 EST    Time: 10 min prior to procedure.

## 2022-11-08 NOTE — ED PROVIDER NOTES
Subjective   History of Present Illness  Aria Fernando is a 89-year-old white female who present secondary to generalized weakness x2 weeks.  Patient received flu shot 2 weeks ago.  She began feeling weak the next day.  This was initially attributed to the flu shot.  However symptoms have continued.  Patient comes out of breath walking from room to the dining room in assisted living.  Simple tasks become burdensome secondary to this weakness.  Patient NP saw her yesterday and obtained blood work.  Hemoglobin was 6.6.  Nurse practitioner called the patient and recommended she come to the ED for evaluation and treatment.  Thus patient presents for evaluation.    Patient's daughter is at bedside.  She provides significant history in addition to patient.    Patient is anticoagulated secondary to DVT earlier this year.    History provided by:  Patient and relative      Review of Systems   Constitutional: Negative for fever.   HENT: Negative.  Negative for rhinorrhea.    Eyes: Negative.  Negative for redness.   Respiratory: Negative for cough.    Cardiovascular: Negative for chest pain.   Gastrointestinal: Positive for blood in stool (Dark blood x1 year). Negative for abdominal pain.   Endocrine: Negative.    Genitourinary: Negative.  Negative for difficulty urinating.   Musculoskeletal: Negative.  Negative for back pain.   Skin: Negative.  Negative for color change.   Neurological: Positive for weakness. Negative for syncope.   Hematological: Negative.    Psychiatric/Behavioral: Negative.    All other systems reviewed and are negative.      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)    • On anticoagulant therapy    • Osteoarthritis    • Polycythemia    • PVD (peripheral vascular disease) (HCC)        Allergies   Allergen Reactions   • Clinoril [Sulindac] Itching   • Codeine Itching   • Coumadin [Warfarin Sodium] GI Bleeding   • Ibuprofen-Oxycodone [Oxycodone-Ibuprofen] Itching   • Keflex [Cephalexin] Itching   • Mydriacyl [Tropicamide] Angioedema   • Don-Synephrine [Phenylephrine] Angioedema   • Penicillins Angioedema   • Phenylephrine Hcl Angioedema   • Sulfa Antibiotics Angioedema   • Zantac [Ranitidine Hcl] Nausea And Vomiting       Past Surgical History:   Procedure Laterality Date   • APPENDECTOMY     • CAROTID ENDARTERECTOMY Left    • COLONOSCOPY N/A 03/03/2018    Procedure:  Colonoscopy to Terminal ileum with APC treatment for 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 and cold biopsy;  Surgeon: Terrie Carroll MD;  Location: [facility] ENDOSCOPY;  Service:    • ENDOSCOPY N/A 03/03/2018    Procedure: EGD with biopsy;  Surgeon: Terrie Carroll MD;  Location: [facility] ENDOSCOPY;  Service:    • KNEE ARTHROSCOPY Right        History reviewed. No pertinent family history.    Social History     Socioeconomic History   • Marital status: Single   Tobacco Use   • Smoking status: Never   • Smokeless tobacco: Never   Vaping Use   • Vaping Use: Never used   Substance and Sexual Activity   • Alcohol use: No   • Drug use: No   • Sexual activity: Never           Objective   Physical Exam  Vitals and nursing note reviewed.   Constitutional:       General: She is not in acute distress.     Appearance: Normal appearance. She is well-developed. She is not ill-appearing, toxic-appearing or diaphoretic.      Comments: 89-year-old white female lying in bed.  Patient appears younger than stated age.  She appears in good health for age.  Patient is a bit overweight.  Vital signs unremarkable.  Patient friendly and cooperative.  Daughter is at bedside.   HENT:      Head: Normocephalic and atraumatic.      Right Ear: Tympanic  membrane, ear canal and external ear normal.      Left Ear: Tympanic membrane, ear canal and external ear normal.      Nose: Nose normal.      Mouth/Throat:      Mouth: Mucous membranes are moist.      Pharynx: Oropharynx is clear.   Eyes:      Extraocular Movements: Extraocular movements intact.      Conjunctiva/sclera: Conjunctivae normal.      Pupils: Pupils are equal, round, and reactive to light.   Cardiovascular:      Rate and Rhythm: Normal rate and regular rhythm.      Pulses: Normal pulses.      Heart sounds: Normal heart sounds. No murmur heard.    No friction rub. No gallop.   Pulmonary:      Effort: Pulmonary effort is normal. Bradypneic: .edddxgibleed.      Breath sounds: Normal breath sounds.   Abdominal:      General: Bowel sounds are normal. There is no distension.      Palpations: Abdomen is soft. There is no mass.      Tenderness: There is no abdominal tenderness. There is no guarding or rebound.      Hernia: No hernia is present.   Genitourinary:     Rectum: Guaiac result positive.      Comments: Black stool.  Strongly Hemoccult positive.  Musculoskeletal:         General: Normal range of motion.      Cervical back: Normal range of motion and neck supple.   Skin:     General: Skin is warm and dry.      Capillary Refill: Capillary refill takes less than 2 seconds.      Coloration: Skin is pale.   Neurological:      General: No focal deficit present.      Mental Status: She is alert and oriented to person, place, and time.      Deep Tendon Reflexes: Reflexes are normal and symmetric.   Psychiatric:         Mood and Affect: Mood normal.         Behavior: Behavior normal.         Procedures           ED Course  ED Course as of 11/15/22 0259   Tue Nov 08, 2022   1132 Patient pale.  Strongly Hemoccult positive.  CBC shows H&H of 6.7 and 23.2.  This is down notably from 2 months ago with hemoglobin 12.3 and hematocrit 39.8.  CMP shows BUN of 36 creatinine 2.09.  INR elevated at 2.52.  PTT 27.7.  PTT  59.7.  Ordering packed red cells.  Starting Protonix.  Calling the hospitalist. [SS]   8288 I discussed this case with Dr. RT Krishnan-hospitalist.  He will admit the patient for further care    Discussed at length with patient and her daughter indication for hospitalization. [SS]      ED Course User Index  [SS] Sergei Leslie MD      Labs Reviewed   COMPREHENSIVE METABOLIC PANEL - Abnormal; Notable for the following components:       Result Value    Glucose 102 (*)     BUN 36 (*)     Creatinine 2.09 (*)     eGFR 21.9 (*)     All other components within normal limits    Narrative:     GFR Normal >60  Chronic Kidney Disease <60  Kidney Failure <15    The GFR formula is only valid for adults with stable renal function between ages 18 and 70.   CBC WITH AUTO DIFFERENTIAL - Abnormal; Notable for the following components:    RBC 2.06 (*)     Hemoglobin 6.7 (*)     Hematocrit 23.1 (*)     .1 (*)     MCHC 29.0 (*)     RDW 19.4 (*)     RDW-SD 80.2 (*)     Monocytes, Absolute 0.94 (*)     All other components within normal limits   PROTIME-INR - Abnormal; Notable for the following components:    Protime 27.7 (*)     INR 2.52 (*)     All other components within normal limits    Narrative:     Therapeutic Ranges for INR: 2.0-3.0 (PT 20-30)                              2.5-3.5 (PT 25-34)   APTT - Abnormal; Notable for the following components:    PTT 59.7 (*)     All other components within normal limits    Narrative:     PTT = The equivalent PTT values for the therapeutic range of heparin levels at 0.1 to 0.7 U/ml are 53 to 110 seconds.     URINALYSIS W/ MICROSCOPIC IF INDICATED (NO CULTURE) - Abnormal; Notable for the following components:    Color, UA Other (*)     Appearance, UA Slightly Cloudy (*)     Blood, UA Trace (*)     Leuk Esterase, UA Small (1+) (*)     All other components within normal limits   URINALYSIS, MICROSCOPIC ONLY - Abnormal; Notable for the following components:    WBC, UA 3-5 (*)     Bacteria,  UA 3+ (*)     All other components within normal limits   TSH - Abnormal; Notable for the following components:    TSH 5.510 (*)     All other components within normal limits   IRON PROFILE - Abnormal; Notable for the following components:    Iron 30 (*)     Iron Saturation 9 (*)     All other components within normal limits   RETICULOCYTES - Abnormal; Notable for the following components:    Reticulocyte % 13.53 (*)     Reticulocyte Absolute 0.2733 (*)     All other components within normal limits   HEMOGLOBIN AND HEMATOCRIT, BLOOD - Abnormal; Notable for the following components:    Hemoglobin 8.2 (*)     Hematocrit 27.1 (*)     All other components within normal limits   BASIC METABOLIC PANEL - Abnormal; Notable for the following components:    BUN 34 (*)     Creatinine 1.85 (*)     eGFR 25.3 (*)     All other components within normal limits    Narrative:     GFR Normal >60  Chronic Kidney Disease <60  Kidney Failure <15    The GFR formula is only valid for adults with stable renal function between ages 18 and 70.   CBC WITH AUTO DIFFERENTIAL - Abnormal; Notable for the following components:    RBC 2.37 (*)     Hemoglobin 7.7 (*)     Hematocrit 25.5 (*)     .6 (*)     MCHC 30.2 (*)     RDW 21.4 (*)     RDW-SD 82.5 (*)     All other components within normal limits   BASIC METABOLIC PANEL - Abnormal; Notable for the following components:    Creatinine 1.48 (*)     eGFR 33.1 (*)     All other components within normal limits    Narrative:     GFR Normal >60  Chronic Kidney Disease <60  Kidney Failure <15    The GFR formula is only valid for adults with stable renal function between ages 18 and 70.   CBC WITH AUTO DIFFERENTIAL - Abnormal; Notable for the following components:    RBC 3.12 (*)     Hemoglobin 9.7 (*)     Hematocrit 31.4 (*)     .6 (*)     MCHC 30.9 (*)     RDW 23.3 (*)     RDW-SD 83.9 (*)     All other components within normal limits   FERRITIN - Normal    Narrative:     Results may be  falsely decreased if patient taking Biotin.     T4, FREE - Normal    Narrative:     Results may be falsely increased if patient taking Biotin.     RAINBOW DRAW    Narrative:     The following orders were created for panel order [facility] Draw.  Procedure                               Abnormality         Status                     ---------                               -----------         ------                     Green Top (Gel)[[id number]]                                  Final result               Lavender Top[[id number]]                                     Final result               Gold Top - SST[[id number]]                                                              Light Blue Top[[id number]]                                   Final result                 Please view results for these tests on the individual orders.   SCAN SLIDE   SCAN SLIDE   TYPE AND SCREEN   PREPARE RBC   PREPARE RBC   CBC AND DIFFERENTIAL    Narrative:     The following orders were created for panel order CBC & Differential.  Procedure                               Abnormality         Status                     ---------                               -----------         ------                     CBC Auto Differential[[id number]]        Abnormal            Final result               Scan Slide[[id number]]                                       Final result                 Please view results for these tests on the individual orders.   GREEN TOP   LAVENDER TOP   LIGHT BLUE TOP   CBC AND DIFFERENTIAL    Narrative:     The following orders were created for panel order CBC & Differential.  Procedure                               Abnormality         Status                     ---------                               -----------         ------                     CBC Auto Differential[[id number]]        Abnormal            Final result               Scan Slide[[id number]]                                       Final result                 Please view  results for these tests on the individual orders.   CBC AND DIFFERENTIAL    Narrative:     The following orders were created for panel order CBC & Differential.  Procedure                               Abnormality         Status                     ---------                               -----------         ------                     CBC Auto Differential[[id number]]        Abnormal            Final result                 Please view results for these tests on the individual orders.       My differential diagnosis includes but is not limited to generalized weakness, electrolyte abnormality, CVA, TIA, Bell's palsy, acute MI, GI bleed, urinary tract infection, systemic infections including sepsis, alcohol abuse, drug abuse including prescription and street drug.    My differential diagnosis for dyspnea includes but is not limited to:  Asthma, COPD, COVID-19, pneumonia, pulmonary embolus, acute respiratory distress syndrome, RSV, pneumothorax, pleural effusion, pulmonary fibrosis, congestive heart failure, myocardial infarction, DKA, uremia, acidosis, sepsis, anemia, drug related, hyperventilation, CNS disease                                       MDM    Final diagnoses:   Occult GI bleeding   Symptomatic anemia       ED Disposition  ED Disposition     ED Disposition   Decision to Admit    Condition   --    Comment   Level of Care: Telemetry [5]   Admitting Physician: HANH SCHUSTER [[id number]]   Attending Physician: HANH SCHUSTER [[id number]]   Patient Class: Observation [104]               [address] [address] [phone]        Jimena Aj MD  [address] [place] [address]  [phone]      1 week         Medication List      New Prescriptions    miconazole 2 % powder  Commonly known as: MICOTIN  Apply  topically to the appropriate area as directed Every 12 (Twelve) Hours.     pantoprazole 40 MG EC tablet  Commonly known as:  Protonix  Take 1 tablet by mouth Daily.        Stop    alendronate 70 MG tablet  Commonly known as: FOSAMAX     amLODIPine 5 MG tablet  Commonly known as: NORVASC     aspirin 81 MG chewable tablet     doxycycline 100 mg in sodium chloride 0.9 % 100 mL IVPB     metoprolol tartrate 25 MG tablet  Commonly known as: LOPRESSOR     Morphine 15 MG tablet  Commonly known as: MSIR     omeprazole 20 MG capsule  Commonly known as: priLOSEC     ondansetron 4 MG tablet  Commonly known as: ZOFRAN     vancomycin 1 g injection  Commonly known as: VANCOCIN     venlafaxine 75 MG tablet  Commonly known as: EFFEXOR     Xarelto 20 MG tablet  Generic drug: rivaroxaban           Where to Get Your Medications      These medications were sent to [facility] [id number] - [place] - [address] [facility]  & [facility] - [phone] [facility] [phone] [address] [place] [address]    Phone: [phone]   · miconazole 2 % powder  · pantoprazole 40 MG EC tablet          Sergei Leslie MD  11/15/22 0253

## 2022-11-09 ENCOUNTER — APPOINTMENT (OUTPATIENT)
Dept: WOUND CARE | Facility: HOSPITAL | Age: 87
End: 2022-11-09

## 2022-11-09 LAB
ANION GAP SERPL CALCULATED.3IONS-SCNC: 10.2 MMOL/L (ref 5–15)
ANISOCYTOSIS BLD QL: NORMAL
BASOPHILS # BLD AUTO: 0.02 10*3/MM3 (ref 0–0.2)
BASOPHILS NFR BLD AUTO: 0.3 % (ref 0–1.5)
BH BB BLOOD EXPIRATION DATE: NORMAL
BH BB BLOOD TYPE BARCODE: 9500
BH BB DISPENSE STATUS: NORMAL
BH BB PRODUCT CODE: NORMAL
BH BB UNIT NUMBER: NORMAL
BUN SERPL-MCNC: 34 MG/DL (ref 8–23)
BUN/CREAT SERPL: 18.4 (ref 7–25)
CALCIUM SPEC-SCNC: 8.4 MG/DL (ref 8.2–9.6)
CHLORIDE SERPL-SCNC: 105 MMOL/L (ref 98–107)
CO2 SERPL-SCNC: 25.8 MMOL/L (ref 22–29)
CREAT SERPL-MCNC: 1.85 MG/DL (ref 0.57–1)
CROSSMATCH INTERPRETATION: NORMAL
DEPRECATED RDW RBC AUTO: 82.5 FL (ref 37–54)
EGFRCR SERPLBLD CKD-EPI 2021: 25.3 ML/MIN/1.73
EOSINOPHIL # BLD AUTO: 0.2 10*3/MM3 (ref 0–0.4)
EOSINOPHIL NFR BLD AUTO: 2.9 % (ref 0.3–6.2)
ERYTHROCYTE [DISTWIDTH] IN BLOOD BY AUTOMATED COUNT: 21.4 % (ref 12.3–15.4)
GLUCOSE SERPL-MCNC: 90 MG/DL (ref 65–99)
HCT VFR BLD AUTO: 25.5 % (ref 34–46.6)
HGB BLD-MCNC: 7.7 G/DL (ref 12–15.9)
IMM GRANULOCYTES # BLD AUTO: 0.01 10*3/MM3 (ref 0–0.05)
IMM GRANULOCYTES NFR BLD AUTO: 0.1 % (ref 0–0.5)
LYMPHOCYTES # BLD AUTO: 2.1 10*3/MM3 (ref 0.7–3.1)
LYMPHOCYTES NFR BLD AUTO: 30.3 % (ref 19.6–45.3)
MACROCYTES BLD QL SMEAR: NORMAL
MCH RBC QN AUTO: 32.5 PG (ref 26.6–33)
MCHC RBC AUTO-ENTMCNC: 30.2 G/DL (ref 31.5–35.7)
MCV RBC AUTO: 107.6 FL (ref 79–97)
MONOCYTES # BLD AUTO: 0.72 10*3/MM3 (ref 0.1–0.9)
MONOCYTES NFR BLD AUTO: 10.4 % (ref 5–12)
NEUTROPHILS NFR BLD AUTO: 3.87 10*3/MM3 (ref 1.7–7)
NEUTROPHILS NFR BLD AUTO: 56 % (ref 42.7–76)
NRBC BLD AUTO-RTO: 0 /100 WBC (ref 0–0.2)
PLAT MORPH BLD: NORMAL
PLATELET # BLD AUTO: 239 10*3/MM3 (ref 140–450)
PMV BLD AUTO: 10.8 FL (ref 6–12)
POTASSIUM SERPL-SCNC: 4 MMOL/L (ref 3.5–5.2)
RBC # BLD AUTO: 2.37 10*6/MM3 (ref 3.77–5.28)
SODIUM SERPL-SCNC: 141 MMOL/L (ref 136–145)
UNIT  ABO: NORMAL
UNIT  RH: NORMAL
WBC MORPH BLD: NORMAL
WBC NRBC COR # BLD: 6.92 10*3/MM3 (ref 3.4–10.8)

## 2022-11-09 PROCEDURE — P9016 RBC LEUKOCYTES REDUCED: HCPCS

## 2022-11-09 PROCEDURE — 96372 THER/PROPH/DIAG INJ SC/IM: CPT

## 2022-11-09 PROCEDURE — 80048 BASIC METABOLIC PNL TOTAL CA: CPT | Performed by: NURSE PRACTITIONER

## 2022-11-09 PROCEDURE — G0378 HOSPITAL OBSERVATION PER HR: HCPCS

## 2022-11-09 PROCEDURE — 94799 UNLISTED PULMONARY SVC/PX: CPT

## 2022-11-09 PROCEDURE — 85007 BL SMEAR W/DIFF WBC COUNT: CPT | Performed by: NURSE PRACTITIONER

## 2022-11-09 PROCEDURE — 86900 BLOOD TYPING SEROLOGIC ABO: CPT

## 2022-11-09 PROCEDURE — 96366 THER/PROPH/DIAG IV INF ADDON: CPT

## 2022-11-09 PROCEDURE — 96368 THER/DIAG CONCURRENT INF: CPT

## 2022-11-09 PROCEDURE — 25010000002 CYANOCOBALAMIN PER 1000 MCG: Performed by: INTERNAL MEDICINE

## 2022-11-09 PROCEDURE — 85025 COMPLETE CBC W/AUTO DIFF WBC: CPT | Performed by: NURSE PRACTITIONER

## 2022-11-09 PROCEDURE — 97605 NEG PRS WND THER DME<=50SQCM: CPT

## 2022-11-09 PROCEDURE — 99225 PR SBSQ OBSERVATION CARE/DAY 25 MINUTES: CPT | Performed by: NURSE PRACTITIONER

## 2022-11-09 PROCEDURE — 99214 OFFICE O/P EST MOD 30 MIN: CPT | Performed by: INTERNAL MEDICINE

## 2022-11-09 PROCEDURE — 36430 TRANSFUSION BLD/BLD COMPNT: CPT

## 2022-11-09 PROCEDURE — 25010000002 NA FERRIC GLUC CPLX PER 12.5 MG: Performed by: INTERNAL MEDICINE

## 2022-11-09 RX ADMIN — Medication 5 MG: at 22:09

## 2022-11-09 RX ADMIN — SODIUM CHLORIDE 8 MG/HR: 900 INJECTION INTRAVENOUS at 01:08

## 2022-11-09 RX ADMIN — MICONAZOLE NITRATE: 20 POWDER TOPICAL at 20:31

## 2022-11-09 RX ADMIN — MICONAZOLE NITRATE: 20 POWDER TOPICAL at 10:10

## 2022-11-09 RX ADMIN — SODIUM CHLORIDE 125 MG: 9 INJECTION, SOLUTION INTRAVENOUS at 10:10

## 2022-11-09 RX ADMIN — SODIUM CHLORIDE 40 ML: 9 INJECTION, SOLUTION INTRAVENOUS at 13:48

## 2022-11-09 RX ADMIN — ACETAMINOPHEN 650 MG: 325 TABLET ORAL at 10:09

## 2022-11-09 RX ADMIN — CYANOCOBALAMIN 1000 MCG: 1000 INJECTION, SOLUTION INTRAMUSCULAR; SUBCUTANEOUS at 10:09

## 2022-11-09 RX ADMIN — Medication 10 ML: at 20:31

## 2022-11-09 RX ADMIN — SODIUM CHLORIDE 8 MG/HR: 900 INJECTION INTRAVENOUS at 23:42

## 2022-11-09 RX ADMIN — Medication 10 ML: at 10:09

## 2022-11-09 RX ADMIN — SODIUM CHLORIDE 8 MG/HR: 900 INJECTION INTRAVENOUS at 07:14

## 2022-11-09 RX ADMIN — ACETAMINOPHEN 650 MG: 325 TABLET ORAL at 22:10

## 2022-11-09 NOTE — PLAN OF CARE
Goal Outcome Evaluation:  Plan of Care Reviewed With: patient        Progress: improving  Outcome Evaluation: New patient from the ED this afternoon. A&Ox4. Room air. Cardiac monitoring in place. Redness to perineal area. Wound to R knee, sees wound care here. Wound consult placed. Dressing changed, C/D/I. No complaints of pain. IV to R AC and L FA. 1 unit of RBCs given today. Will continue to monitor.

## 2022-11-09 NOTE — PROGRESS NOTES
"SERVICE: [facility] HOSPITALIST    CONSULTANTS: GI    CHIEF COMPLAINT: Follow-up GI bleed, acute blood loss anemia    SUBJECTIVE: Patient reports she is feeling well, tolerating diet, having bowel movements and no concerns overnight.  She is anxious to go home however understands that she will receive another unit of blood today.  She voices no new concerns at this time.    OBJECTIVE:    /53 (BP Location: Left arm, Patient Position: Lying)   Pulse 64   Temp 98.1 °F (36.7 °C) (Oral)   Resp 18   Ht 154.9 cm (61\")   Wt 75.7 kg (166 lb 12.8 oz)   SpO2 92%   BMI 31.52 kg/m²     MEDS/LABS REVIEWED AND ORDERED    cyanocobalamin, 1,000 mcg, Intramuscular, Daily  ferric gluconate, 125 mg, Intravenous, Daily  miconazole, , Topical, Q12H  sodium chloride, 10 mL, Intravenous, Q12H      Physical Exam  Vitals reviewed.   Constitutional:       General: She is not in acute distress.     Comments: Pale, elderly   HENT:      Head: Normocephalic and atraumatic.      Mouth/Throat:      Mouth: Mucous membranes are moist.      Comments: edentulous  Eyes:      Extraocular Movements: Extraocular movements intact.      Pupils: Pupils are equal, round, and reactive to light.   Cardiovascular:      Rate and Rhythm: Normal rate and regular rhythm.   Pulmonary:      Effort: Pulmonary effort is normal. No respiratory distress.      Breath sounds: Normal breath sounds. No wheezing or rales.   Abdominal:      General: Abdomen is flat. Bowel sounds are normal. There is no distension.      Palpations: Abdomen is soft.      Tenderness: There is no abdominal tenderness. There is no guarding.   Skin:     General: Skin is warm and dry.      Capillary Refill: Capillary refill takes less than 2 seconds.      Findings: No erythema.      Comments: Right knee dressing intact   Neurological:      General: No focal deficit present.      Mental Status: She is alert and oriented to person, place, and time.   Psychiatric:         " Mood and Affect: Mood normal.       LAB/DIAGNOSTICS:    Lab Results (last 24 hours)     Procedure Component Value Units Date/Time    CBC & Differential [[id number]]  (Abnormal) Collected: 11/09/22 0638    Specimen: Blood Updated: 11/09/22 0806    Narrative:      The following orders were created for panel order CBC & Differential.  Procedure                               Abnormality         Status                     ---------                               -----------         ------                     CBC Auto Differential[[id number]]        Abnormal            Final result               Scan Slide[[id number]]                                       Final result                 Please view results for these tests on the individual orders.    Scan Slide [[id number]] Collected: 11/09/22 0638    Specimen: Blood Updated: 11/09/22 0806     Anisocytosis Slight/1+     Macrocytes Slight/1+     WBC Morphology Normal     Platelet Morphology Normal    Basic Metabolic Panel [[id number]]  (Abnormal) Collected: 11/09/22 0638    Specimen: Blood Updated: 11/09/22 0712     Glucose 90 mg/dL      BUN 34 mg/dL      Creatinine 1.85 mg/dL      Sodium 141 mmol/L      Potassium 4.0 mmol/L      Chloride 105 mmol/L      CO2 25.8 mmol/L      Calcium 8.4 mg/dL      BUN/Creatinine Ratio 18.4     Anion Gap 10.2 mmol/L      eGFR 25.3 mL/min/1.73      Comment: National Kidney Foundation and American Society of Nephrology (ASN) Task Force recommended calculation based on the Chronic Kidney Disease Epidemiology Collaboration (CKD-EPI) equation refit without adjustment for race.       Narrative:      GFR Normal >60  Chronic Kidney Disease <60  Kidney Failure <15    The GFR formula is only valid for adults with stable renal function between ages 18 and 70.    CBC Auto Differential [[id number]]  (Abnormal) Collected: 11/09/22 0638    Specimen: Blood Updated: 11/09/22 0651     WBC 6.92 10*3/mm3      RBC 2.37 10*6/mm3      Hemoglobin 7.7 g/dL      Hematocrit  25.5 %      .6 fL      MCH 32.5 pg      MCHC 30.2 g/dL      RDW 21.4 %      RDW-SD 82.5 fl      MPV 10.8 fL      Platelets 239 10*3/mm3      Neutrophil % 56.0 %      Lymphocyte % 30.3 %      Monocyte % 10.4 %      Eosinophil % 2.9 %      Basophil % 0.3 %      Immature Grans % 0.1 %      Neutrophils, Absolute 3.87 10*3/mm3      Lymphocytes, Absolute 2.10 10*3/mm3      Monocytes, Absolute 0.72 10*3/mm3      Eosinophils, Absolute 0.20 10*3/mm3      Basophils, Absolute 0.02 10*3/mm3      Immature Grans, Absolute 0.01 10*3/mm3      nRBC 0.0 /100 WBC     Hemoglobin & Hematocrit, Blood [[id number]]  (Abnormal) Collected: 11/08/22 2212    Specimen: Blood Updated: 11/08/22 2217     Hemoglobin 8.2 g/dL      Hematocrit 27.1 %     Reticulocytes [[id number]]  (Abnormal) Collected: 11/08/22 1040    Specimen: Blood Updated: 11/08/22 1744     Reticulocyte % 13.53 %      Reticulocyte Absolute 0.2733 10*6/mm3     TSH [[id number]]  (Abnormal) Collected: 11/08/22 1040    Specimen: Blood Updated: 11/08/22 1609     TSH 5.510 uIU/mL     Iron Profile [[id number]]  (Abnormal) Collected: 11/08/22 1040    Specimen: Blood Updated: 11/08/22 1420     Iron 30 mcg/dL      Iron Saturation 9 %      TIBC 344 mcg/dL      UIBC 314 mcg/dL     Ferritin [[id number]]  (Normal) Collected: 11/08/22 1040    Specimen: Blood Updated: 11/08/22 1420     Ferritin 84.00 ng/mL     Narrative:      Results may be falsely decreased if patient taking Biotin.      Urinalysis, Microscopic Only - Urine, Clean Catch [[id number]]  (Abnormal) Collected: 11/08/22 1159    Specimen: Urine, Clean Catch Updated: 11/08/22 1325     RBC, UA None Seen /HPF      WBC, UA 3-5 /HPF      Bacteria, UA 3+ /HPF      Squamous Epithelial Cells, UA 0-2 /HPF      Hyaline Casts, UA None Seen /LPF      Methodology Manual Light Microscopy    Urinalysis With Microscopic If Indicated (No Culture) - Urine, Clean Catch [[id number]]  (Abnormal) Collected: 11/08/22 1159    Specimen: Urine, Clean Catch  Updated: 11/08/22 1319     Color, UA Other     Appearance, UA Slightly Cloudy     pH, UA 6.5     Specific Gravity, UA 1.010     Glucose, UA Negative     Ketones, UA Negative     Bilirubin, UA Negative     Blood, UA Trace     Protein, UA Negative     Leuk Esterase, UA Small (1+)     Nitrite, UA Negative     Urobilinogen, UA 0.2 E.U./dL    CBC & Differential [[id number]]  (Abnormal) Collected: 11/08/22 1040    Specimen: Blood Updated: 11/08/22 1208    Narrative:      The following orders were created for panel order CBC & Differential.  Procedure                               Abnormality         Status                     ---------                               -----------         ------                     CBC Auto Differential[[id number]]        Abnormal            Final result               Scan Slide[[id number]]                                       Final result                 Please view results for these tests on the individual orders.    Scan Slide [[id number]] Collected: 11/08/22 1040    Specimen: Blood Updated: 11/08/22 1208     Anisocytosis Slight/1+     Macrocytes Slight/1+     Polychromasia Slight/1+     WBC Morphology Normal     Platelet Morphology Normal    [place] Draw [[id number]] Collected: 11/08/22 1040    Specimen: Blood Updated: 11/08/22 1145    Narrative:      The following orders were created for panel order [place] Draw.  Procedure                               Abnormality         Status                     ---------                               -----------         ------                     Green Top (Gel)[[id number]]                                  Final result               Lavender Top[[id number]]                                     Final result               Gold Top - SST[[id number]]                                                              Light Blue Top[[id number]]                                   Final result                 Please view results for these tests on the individual orders.     Green Top (Gel) [[id number]] Collected: 11/08/22 1040    Specimen: Blood Updated: 11/08/22 1145     Extra Tube Hold for add-ons.     Comment: Auto resulted.       Light Blue Top [[id number]] Collected: 11/08/22 1040    Specimen: Blood Updated: 11/08/22 1145     Extra Tube Hold for add-ons.     Comment: Auto resulted       Lavender Top [[id number]] Collected: 11/08/22 1040    Specimen: Blood Updated: 11/08/22 1145     Extra Tube hold for add-on     Comment: Auto resulted       aPTT [[id number]]  (Abnormal) Collected: 11/08/22 1040    Specimen: Blood Updated: 11/08/22 1131     PTT 59.7 seconds     Narrative:      PTT = The equivalent PTT values for the therapeutic range of heparin levels at 0.1 to 0.7 U/ml are 53 to 110 seconds.      Protime-INR [[id number]]  (Abnormal) Collected: 11/08/22 1040    Specimen: Blood Updated: 11/08/22 1131     Protime 27.7 Seconds      INR 2.52    Narrative:      Therapeutic Ranges for INR: 2.0-3.0 (PT 20-30)                              2.5-3.5 (PT 25-34)    Comprehensive Metabolic Panel [[id number]]  (Abnormal) Collected: 11/08/22 1040    Specimen: Blood Updated: 11/08/22 1113     Glucose 102 mg/dL      BUN 36 mg/dL      Creatinine 2.09 mg/dL      Sodium 142 mmol/L      Potassium 4.6 mmol/L      Chloride 105 mmol/L      CO2 28.1 mmol/L      Calcium 8.8 mg/dL      Total Protein 6.1 g/dL      Albumin 4.00 g/dL      ALT (SGPT) 7 U/L      AST (SGOT) 13 U/L      Alkaline Phosphatase 90 U/L      Total Bilirubin 0.3 mg/dL      Globulin 2.1 gm/dL      A/G Ratio 1.9 g/dL      BUN/Creatinine Ratio 17.2     Anion Gap 8.9 mmol/L      eGFR 21.9 mL/min/1.73      Comment: National Kidney Foundation and American Society of Nephrology (ASN) Task Force recommended calculation based on the Chronic Kidney Disease Epidemiology Collaboration (CKD-EPI) equation refit without adjustment for race.       Narrative:      GFR Normal >60  Chronic Kidney Disease <60  Kidney Failure <15    The GFR formula is only valid for  adults with stable renal function between ages 18 and 70.    CBC Auto Differential [[id number]]  (Abnormal) Collected: 11/08/22 1040    Specimen: Blood Updated: 11/08/22 1054     WBC 9.18 10*3/mm3      RBC 2.06 10*6/mm3      Hemoglobin 6.7 g/dL      Hematocrit 23.1 %      .1 fL      MCH 32.5 pg      MCHC 29.0 g/dL      RDW 19.4 %      RDW-SD 80.2 fl      MPV 10.6 fL      Platelets 298 10*3/mm3      Neutrophil % 63.7 %      Lymphocyte % 23.4 %      Monocyte % 10.2 %      Eosinophil % 2.2 %      Basophil % 0.3 %      Immature Grans % 0.2 %      Neutrophils, Absolute 5.84 10*3/mm3      Lymphocytes, Absolute 2.15 10*3/mm3      Monocytes, Absolute 0.94 10*3/mm3      Eosinophils, Absolute 0.20 10*3/mm3      Basophils, Absolute 0.03 10*3/mm3      Immature Grans, Absolute 0.02 10*3/mm3         No orders to display     Results for orders placed during the hospital encounter of 02/28/18    Adult Transthoracic Echo Complete W/ Cont if Necessary Per Protocol    Interpretation Summary  · Left atrial cavity size is mildly dilated.  · There is calcification of the aortic valve.  · Mild-to-moderate mitral valve regurgitation is present  · Mild tricuspid valve regurgitation is present.  · Left Ventricle: Calculated EF = 62.9%.  · There is no evidence of pericardial effusion    XR Chest 2 View    Result Date: 11/8/2022  1. Stable mild cardiomegaly. 2. Suspected tiny posterior pleural effusions and mild basilar atelectasis. Lungs otherwise clear.  This report was finalized on 11/8/2022 12:02 PM by Dr. Jose Alba MD.      ASSESSMENT/PLAN:  Acute blood loss anemia secondary to GI bleed on Xarelto:  GERD: GI following  Transfusing second unit PRBCs, continues Protonix drip, Ferrlecit daily  If hemoglobin stable tomorrow, anticipate discharge home at that time    MARTA on CKD stage III:  Baseline creatinine approximately 1.4, high as 2.14, currently down to 1.85  Recheck in a.m.    Right knee wound with wound VAC: Wound RN  "managing    Permanent A. Fib:  CAD/HLD:  Hypertension:  Possible history of D CHF:  Rate and rhythm remain at goal, BP low goal  Blood pressure at goal on home metoprolol, amiodarone  Continue to hold amlodipine  Monitor    Severe left ICA stenosis status post left CEA:  PVD:  History of mesenteric thrombosis:  Nothing acute currently    OA:  Chronic right knee pain, chronic gait instability: As above wound RN managing  Continue falls precautions    PLAN FOR DISPOSITION: Anticipate discharge home tomorrow    CANDELARIO Montes  Hospitalist, [facility]  11/09/22  09:22 EST    Note Disclaimer: At [facility], we believe that sharing information builds trust and better relationships. You are receiving this note because you recently visited [facility]. It is possible you will see health information before a provider has talked with you about it. This kind of information can be easy to misunderstand. To help you fully understand what it means for your health, we urge you to discuss this note with your provider.     \"Dictated utilizing Dragon dictation\"      "

## 2022-11-09 NOTE — PLAN OF CARE
Goal Outcome Evaluation:  Plan of Care Reviewed With: patient        Progress: improving  Outcome Evaluation: Pt VS improving, am labs pending. Pt continues room air, continues tele, SR, HR 60's. Pt reports headache improved with current regimen, see emar, flowsheets. Pt with dressing to R knee, c/d/I; pt to see wound rn. Pt s/p 1 unit blood given on day shift with hgb increase from 6.7 to 8.2; pt continues protonix infusion. Pt resting at this time.

## 2022-11-09 NOTE — CASE MANAGEMENT/SOCIAL WORK
Continued Stay Note  KALA Lu     Patient Name: Aria Fernando  MRN: [id number]  Today's Date: 11/9/2022    Admit Date: 11/8/2022    Plan: plan return to [facility]/ current with [facility]   Discharge Plan     Row Name 11/09/22 1553       Plan    Plan plan return to [facility]/ current with [facility]    Patient/Family in Agreement with Plan yes    Plan Comments Spoke with patient at bedside. Face sheet verified. Address listed on face sheet is her daughters address. Patient is a resident at [facility] in [place]. She is fairly independent at the [facility] and only udes a rollator for mobility. She is current with [facility]. She has been to [facility] in the past. She has a living will . She sees Dr Jimena Aj as PCP and uses [facility] pharmacy [place]. There are no issues obtaining medications. Patient plans to return to [facility] with [facility] to follow at [place]. No additional needs voiced. CM # placed on white board, will continue to follow.               Discharge Codes    No documentation.                     Robert Henriquez RN

## 2022-11-09 NOTE — PROGRESS NOTES
GI Daily Progress Note    Assessment/Plan:      Occult GI bleeding       LOS: 0 days     Aria Fernando is a 89 y.o. female who was admitted with Occult GI bleeding. She reports the symptoms are improving with treatment. She was up eating dinner and has had a BM today and denies Melena. Received another unit today will await her AM labs and she has received B12/Iron.    Subjective:    Patient expresses No GI complaints  Patient denies abdominal pain, diarrhea, bloody stools, difficulty swallowing and loss of appetite    Objective:    Vital signs in last 24 hours:  Temp:  [96.5 °F (35.8 °C)-98.1 °F (36.7 °C)] 97.8 °F (36.6 °C)  Heart Rate:  [62-72] 66  Resp:  [16-18] 16  BP: ()/(48-71) 117/70    Intake/Output last 3 shifts:  I/O last 3 completed shifts:  In: 420 [P.O.:120; Blood:300]  Out: 2350 [Urine:2350]  Intake/Output this shift:  I/O this shift:  In: 537.5 [P.O.:120; Blood:417.5]  Out: 150 [Urine:150]    Physical Exam:Abdomen  Sounds Normal Active Bowel Sounds   Distension Soft   Tenderness Nontender     Imaging Results (Last 72 Hours)     Procedure Component Value Units Date/Time    XR Chest 2 View [[id number]] Collected: 11/08/22 1159     Updated: 11/08/22 1204    Narrative:      CHEST X-RAY, 11/8/2022         HISTORY:  89-year-old female in the ED complaining of one to two week history of  fatigue, weakness. Low hemoglobin.     TECHNIQUE:  PA and lateral upright chest series.     COMPARISON:  *  Chest x-ray, 5/26/2022.     FINDINGS:  Mild cardiomegaly is stable. Pulmonary vascularity is normal. Mild  basilar atelectasis. No visible airspace consolidation. Lateral image  shows likely tiny posterior pleural effusions.       Impression:      1. Stable mild cardiomegaly.  2. Suspected tiny posterior pleural effusions and mild basilar  atelectasis. Lungs otherwise clear.     This report was finalized on 11/8/2022 12:02 PM by Dr. Jose Alba MD.             WBC   Date Value Ref Range Status    11/09/2022 6.92 3.40 - 10.80 10*3/mm3 Final     RBC   Date Value Ref Range Status   11/09/2022 2.37 (L) 3.77 - 5.28 10*6/mm3 Final     Hemoglobin   Date Value Ref Range Status   11/09/2022 7.7 (L) 12.0 - 15.9 g/dL Final     Hematocrit   Date Value Ref Range Status   11/09/2022 25.5 (L) 34.0 - 46.6 % Final     MCV   Date Value Ref Range Status   11/09/2022 107.6 (H) 79.0 - 97.0 fL Final     MCH   Date Value Ref Range Status   11/09/2022 32.5 26.6 - 33.0 pg Final     MCHC   Date Value Ref Range Status   11/09/2022 30.2 (L) 31.5 - 35.7 g/dL Final     RDW   Date Value Ref Range Status   11/09/2022 21.4 (H) 12.3 - 15.4 % Final     RDW-SD   Date Value Ref Range Status   11/09/2022 82.5 (H) 37.0 - 54.0 fl Final     MPV   Date Value Ref Range Status   11/09/2022 10.8 6.0 - 12.0 fL Final     Platelets   Date Value Ref Range Status   11/09/2022 239 140 - 450 10*3/mm3 Final     Neutrophil %   Date Value Ref Range Status   11/09/2022 56.0 42.7 - 76.0 % Final     Lymphocyte %   Date Value Ref Range Status   11/09/2022 30.3 19.6 - 45.3 % Final     Monocyte %   Date Value Ref Range Status   11/09/2022 10.4 5.0 - 12.0 % Final     Eosinophil %   Date Value Ref Range Status   11/09/2022 2.9 0.3 - 6.2 % Final     Basophil %   Date Value Ref Range Status   11/09/2022 0.3 0.0 - 1.5 % Final     Immature Grans %   Date Value Ref Range Status   11/09/2022 0.1 0.0 - 0.5 % Final     Neutrophils, Absolute   Date Value Ref Range Status   11/09/2022 3.87 1.70 - 7.00 10*3/mm3 Final     Lymphocytes, Absolute   Date Value Ref Range Status   11/09/2022 2.10 0.70 - 3.10 10*3/mm3 Final     Monocytes, Absolute   Date Value Ref Range Status   11/09/2022 0.72 0.10 - 0.90 10*3/mm3 Final     Eosinophils, Absolute   Date Value Ref Range Status   11/09/2022 0.20 0.00 - 0.40 10*3/mm3 Final     Basophils, Absolute   Date Value Ref Range Status   11/09/2022 0.02 0.00 - 0.20 10*3/mm3 Final     Immature Grans, Absolute   Date Value Ref Range Status    11/09/2022 0.01 0.00 - 0.05 10*3/mm3 Final     nRBC   Date Value Ref Range Status   11/09/2022 0.0 0.0 - 0.2 /100 WBC Final       Glucose   Date Value Ref Range Status   11/09/2022 90 65 - 99 mg/dL Final     Sodium   Date Value Ref Range Status   11/09/2022 141 136 - 145 mmol/L Final     Potassium   Date Value Ref Range Status   11/09/2022 4.0 3.5 - 5.2 mmol/L Final     CO2   Date Value Ref Range Status   11/09/2022 25.8 22.0 - 29.0 mmol/L Final     Chloride   Date Value Ref Range Status   11/09/2022 105 98 - 107 mmol/L Final     Anion Gap   Date Value Ref Range Status   11/09/2022 10.2 5.0 - 15.0 mmol/L Final     Creatinine   Date Value Ref Range Status   11/09/2022 1.85 (H) 0.57 - 1.00 mg/dL Final     BUN   Date Value Ref Range Status   11/09/2022 34 (H) 8 - 23 mg/dL Final     BUN/Creatinine Ratio   Date Value Ref Range Status   11/09/2022 18.4 7.0 - 25.0 Final     Calcium   Date Value Ref Range Status   11/09/2022 8.4 8.2 - 9.6 mg/dL Final     Alkaline Phosphatase   Date Value Ref Range Status   11/08/2022 90 39 - 117 U/L Final     Total Protein   Date Value Ref Range Status   11/08/2022 6.1 6.0 - 8.5 g/dL Final     ALT (SGPT)   Date Value Ref Range Status   11/08/2022 7 1 - 33 U/L Final     AST (SGOT)   Date Value Ref Range Status   11/08/2022 13 1 - 32 U/L Final     Total Bilirubin   Date Value Ref Range Status   11/08/2022 0.3 0.0 - 1.2 mg/dL Final     Albumin   Date Value Ref Range Status   11/08/2022 4.00 3.50 - 5.20 g/dL Final     Globulin   Date Value Ref Range Status   11/08/2022 2.1 gm/dL Final     Anemia   Coagulopathy  RLE wound  Heme positive Stool  B12 deficiency  Iron Deficiency  PAD  AFIB/DVT    Present plan to transfuse and replace her Iron/B12 appears to be working but will awaitn AM labs and possibly D/C tomorrow. She is aware that scopes are an option and would hold AC until her HGB is > 10.

## 2022-11-09 NOTE — NURSING NOTE
11/09/22 1625   Wound 11/08/22 1651 Right anterior knee   Placement Date/Time: 11/08/22 1651   Present on Hospital Admission: Yes  Side: Right  Orientation: anterior  Location: knee   Dressing Appearance dry;intact   Closure None   Base clean;slough;yellow;pink;moist   Periwound intact;dry   Periwound Temperature warm   Periwound Skin Turgor soft   Edges open   Wound Length (cm) 1 cm   Wound Width (cm) 0.5 cm   Wound Depth (cm) 0.4 cm   Wound Surface Area (cm^2) 0.5 cm^2   Wound Volume (cm^3) 0.2 cm^3   Drainage Characteristics/Odor serous   Drainage Amount scant   Care, Wound cleansed with;sterile normal saline;dressing removed;negative pressure wound therapy   Dressing Care dressing changed;other (see comments)  (maci npwt dsg applied)   Periwound Care dry periwound area maintained;other (see comments)  (skin barrier wipe applied)   NPWT (Negative Pressure Wound Therapy) 11/09/22 1625 right knee   Placement Date/Time: 11/09/22 1625   Location: right knee  Additional Comments: disposable MACI 7 day npwt dressing applied   Therapy Setting continuous therapy   Dressing other (see comments)  (maci dsg applied)   Pressure Setting other (see comments)   Sponges Inserted   (80)     Right knee wound stable and without s/s of infection. Edges have almost completely contracted inward with only a very small area that is actually open. Scant serous drainage noted on old dsg when removed and a very small amt of yellow non-viable tissue to base. Periwound skin dry and intact. No extending erythema/streaking present.     MACI npwt applied to right knee wound and a good seal obtained with pump functioning appropriately. She tolerated without complaints and plan for pt to follow up in our C next week for continued management of RLE wound. She verbalizes understanding.    Update provided to NESSA Boyle.    Please call with any questions.

## 2022-11-09 NOTE — NURSING NOTE
[facility] follow up. I spoke with NESSA Lopez in the wound care center this morning about Ms. Fernando. Jessica requested that her current wound care to right knee be continued while inpatient and pt to follow back up in [facility] in 1 week. Disposable npwt has been the most recent treatment modality with patient following up weekly for dressing changes.      She was admitted yesterday with her MACI dressing in place to right knee but not connected to pump. I advised that the dressing be removed and a saline wet to dry dressing be applied.    I spoke with CANDELARIO Vazquez and she is in agreement for me to manage wound while patient is inpatient. We will resume the MACI npwt today with plans for patient to follow up in [facility] next week.

## 2022-11-09 NOTE — PAYOR COMM NOTE
"Aria Fernando (89 y.o. Female)     ATTN: NURSE REVIEWER  RE: OBSERVATION ADMIT - AUTH REQUEST FOR PROCEDURES  REF#[id number]  PLS REPLY TO TEOFILO MURPHY [phone] FAX# [phone]      Date of Birth   09/27/1930    Social Security Number       Address   [facility] [address] [facility]    Home Phone   [phone]    MRN   [id number]       [place]   [facility]    Marital Status   Single                            Admission Date   11/8/22    Admission Type   Emergency    Admitting Provider   Fredi Krishnan MD    Attending Provider   Fredi Krishnan MD    Department, Room/Bed   [facility] SURG, 1411/1       Discharge Date       Discharge Disposition       Discharge Destination                               Attending Provider: Fredi Krishnan MD    Allergies: Clinoril [Sulindac], Codeine, Coumadin [Warfarin Sodium], Ibuprofen-oxycodone [Oxycodone-ibuprofen], Keflex [Cephalexin], Mydriacyl [Tropicamide], Don-synephrine [Phenylephrine], Penicillins, Phenylephrine Hcl, Sulfa Antibiotics, Zantac [Ranitidine Hcl]    Isolation: None   Infection: None   Code Status: CPR    Ht: 154.9 cm (61\")   Wt: 75.7 kg (166 lb 12.8 oz)    Admission Cmt: None   Principal Problem: Occult GI bleeding [R19.5]                 Active Insurance as of 11/8/2022     Primary Coverage     Payor Plan Insurance Group Employer/Plan Group    [facility] MEDICARE REPLACEMENT [employer] MEDICARE REPLACEMENT      Payor Plan Address Payor Plan Phone Number Payor Plan Fax Number Effective Dates    [address] [address] [phone]  5/25/2022 - None Entered    [facility] [id number]       Subscriber Name Subscriber Birth Date Member ID       ARIA FERNANDO 9/27/1930 [id number]           Secondary Coverage     Payor Plan Insurance Group Employer/Plan Group    [place]      Payor Plan Address Payor Plan Phone Number Payor Plan Fax Number Effective Dates    [address] [phone]  5/1/2022 - None " Entered    [facility] [id number]       Subscriber Name Subscriber Birth Date Member ID       VETO CONNELL 9/27/1930 [id number]                 Emergency Contacts      (Rel.) Home Phone Work Phone Mobile Phone    Ina Goodman (Power of ) -- -- [phone]    Zayra Amado (Power of ) [phone] -- [phone]               History & Physical      Heidi Call APRN at 11/08/22 1137     Attestation signed by Fredi Krishnan MD at 11/08/22 1442    I have reviewed this documentation and agree with the plan as documented.                  [facility] HOSPITALIST     Jimena Aj MD    CHIEF COMPLAINT: generalized weakness x 2 weeks    HISTORY OF PRESENT ILLNESS:  The patient is a 89-year-old female who presented to the emergency department with her daughter from assisted living, secondary to 2 weeks of generalized weakness, mild shortness of air with exertion.  She notes having blood drawn yesterday noting she was anemic and was told to come to ER today.  She notes stools have been dark however attributed this to taking iron. She denies bloody stools and notes she has a great appetite and no pain concerns. She reports that she was doing extremely well, continues with right knee area wound VAC managed by wound clinic at this facility.  Daughter reports she was nearly to be released from wound care and patient reports absolutely no sick symptoms whatsoever recently.    Diagnostics in ER showed grossly positive Hemoccult stool, creatinine 2.09, hemoglobin 6.7.  She was given IV fluid bolus and single dose of Protonix.  1 unit PRBCs has been ordered.    She has a history of Atrial fibrillation, DVT on xarelto h/o dCHF, CAD/HLD, HTN, CKD, mesenteric thrombosis, colitis, PVD, OA, GERD, chronic right knee pain with gait instability, GIB on warfarin and xarelto in past, severe left ICA stenosis s/p left CEA, right leg hematoma.    She otherwise denies  f/c/headache/rhinorrhea/nasal congestion/lightheadedness/syncopal sensation/cough/n/v/d/chest pain/abdominal pain/recent illness/sick exposures/change in bowel or bladder habits/no weight change/bloody emesis or bloody stools/change in medications or any other new concerns.    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)    • On anticoagulant therapy    • Osteoarthritis    • Polycythemia    • PVD (peripheral vascular disease) (HCC)      Past Surgical History:   Procedure Laterality Date   • APPENDECTOMY     • CAROTID ENDARTERECTOMY Left    • COLONOSCOPY N/A 03/03/2018    Procedure:  Colonoscopy to Terminal ileum with APC treatment for 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 and cold biopsy;  Surgeon: Terrie Carroll MD;  Location: [facility] ENDOSCOPY;  Service:    • ENDOSCOPY N/A 03/03/2018    Procedure: EGD with biopsy;  Surgeon: Terrie Carroll MD;  Location: [facility] ENDOSCOPY;  Service:    • KNEE ARTHROSCOPY Right      History reviewed. No pertinent family history.  Social History     Tobacco Use   • Smoking status: Never   • Smokeless tobacco: Never   Vaping Use   • Vaping Use: Never used   Substance Use Topics   • Alcohol use: No   • Drug use: No     Medications Prior to Admission   Medication Sig Dispense Refill Last Dose   • alendronate (FOSAMAX) 70 MG tablet Take 70 mg by mouth Every 7 (Seven) Days.   Past Week   • amiodarone (PACERONE) 200 MG tablet Take 200 mg by mouth Daily.   11/8/2022   • amLODIPine (NORVASC) 5 MG tablet Take 5 mg by mouth Daily.   11/8/2022   • aspirin 81 MG chewable tablet Chew 81 mg Daily.   11/8/2022   • Calcium Carb-Cholecalciferol (OYSTER SHELL CALCIUM/VITAMIN D)  250-125 MG-UNIT tablet tablet Take 2 tablets by mouth 2 (Two) Times a Day.   11/8/2022   • cholecalciferol (VITAMIN D3) 400 units tablet Take 400 Units by mouth 2 (Two) Times a Day.   11/8/2022   • docusate sodium (COLACE) 100 MG capsule Take 1 capsule by mouth 2 (Two) Times a Day As Needed.   Patient Taking Differently   • ferrous sulfate 325 (65 FE) MG tablet Take 325 mg by mouth Daily With Breakfast.   11/8/2022   • gabapentin (NEURONTIN) 100 MG capsule Take 100 mg by mouth 2 (Two) Times a Day.   11/8/2022   • metoprolol tartrate (LOPRESSOR) 25 MG tablet Take 12.5 mg by mouth 2 (Two) Times a Day.   11/8/2022   • mirtazapine (REMERON) 15 MG tablet Take 15 mg by mouth Every Night.   11/7/2022   • omeprazole (priLOSEC) 20 MG capsule Take 20 mg by mouth Daily.   11/8/2022   • rosuvastatin (CRESTOR) 5 MG tablet Take 5 mg by mouth Daily.   11/8/2022   • acetaminophen (TYLENOL) 325 MG tablet Take 2 tablets by mouth Every 4 (Four) Hours As Needed for Mild Pain .      • doxycycline 100 mg in sodium chloride 0.9 % 100 mL IVPB Infuse 100 mg into a venous catheter 2 (Two) Times a Day. (Patient not taking: Reported on 11/8/2022)   Not Taking   • Morphine (MSIR) 15 MG tablet  (Patient not taking: Reported on 11/8/2022)   Not Taking   • ondansetron (ZOFRAN) 4 MG tablet Take 4 mg by mouth Every 4 (Four) Hours As Needed for Nausea or Vomiting.      • vancomycin (VANCOCIN) 1 g injection       • venlafaxine (EFFEXOR) 75 MG tablet  (Patient not taking: Reported on 11/8/2022)   Not Taking   • Xarelto 20 MG tablet 1 tablet Daily.        Allergies:  Clinoril [sulindac], Codeine, Coumadin [warfarin sodium], Ibuprofen-oxycodone [oxycodone-ibuprofen], Keflex [cephalexin], Mydriacyl [tropicamide], Don-synephrine [phenylephrine], Penicillins, Phenylephrine hcl, Sulfa antibiotics, and Zantac [ranitidine hcl]    Immunization History   Administered Date(s) Administered   • COVID-19 (PFIZER) PURPLE CAP 01/06/2021, 01/27/2021, 12/09/2021,  "07/14/2022   • Influenza, Unspecified 10/31/2017   • Tdap 11/11/2021       REVIEW OF SYSTEMS:  Please see the above history of present illness for pertinent positives and negatives.  The remainder of the patient's systems have been reviewed and are negative.     Vital Signs  Temp:  [97.6 °F (36.4 °C)-97.8 °F (36.6 °C)] 97.8 °F (36.6 °C)  Heart Rate:  [60-65] 65  Resp:  [18] 18  BP: ()/(45-85) 106/49   Body mass index is 31.52 kg/m².    Flowsheet Rows    Flowsheet Row First Filed Value   Admission Height 154.9 cm (61\") Documented at 11/08/2022 1027   Admission Weight 79.9 kg (176 lb 1.6 oz) Documented at 11/08/2022 1027           Physical Exam  Vitals reviewed.   Constitutional:       General: She is not in acute distress.     Appearance: She is obese.      Comments: Pale, elderly   HENT:      Head: Normocephalic and atraumatic.      Mouth/Throat:      Mouth: Mucous membranes are moist.      Comments: Edentulous  Eyes:      Extraocular Movements: Extraocular movements intact.      Pupils: Pupils are equal, round, and reactive to light.   Cardiovascular:      Rate and Rhythm: Normal rate and regular rhythm.   Pulmonary:      Effort: Pulmonary effort is normal. No respiratory distress.      Breath sounds: Normal breath sounds. No wheezing or rales.   Abdominal:      General: Abdomen is flat. Bowel sounds are normal. There is no distension.      Palpations: Abdomen is soft.      Tenderness: There is no abdominal tenderness. There is no guarding.   Musculoskeletal:      Comments: Right knee with wound VAC dressing in place, healing ecchymosis surrounding knee area   Skin:     General: Skin is warm and dry.      Capillary Refill: Capillary refill takes less than 2 seconds.      Coloration: Skin is pale.      Findings: No erythema.   Neurological:      General: No focal deficit present.      Mental Status: She is alert and oriented to person, place, and time.   Psychiatric:         Mood and Affect: Mood normal.       "   Behavior: Behavior normal.       Emotional Behavior:    Judgement and Insight: Good   Mental Status:  Alertness alert   Memory: Good   Mood and Affect:         Depression none               Anxiety none    Debilities:   Physical Weakness secondary to right knee hematoma/wound VAC and blood loss   Handicaps  unknown   Disabilities  unknown   Agitation  none     Results Review:    I reviewed the patient's new clinical results.  Lab Results (most recent)     Procedure Component Value Units Date/Time    aPTT [[id number]]  (Abnormal) Collected: 11/08/22 1040    Specimen: Blood Updated: 11/08/22 1131     PTT 59.7 seconds     Narrative:      PTT = The equivalent PTT values for the therapeutic range of heparin levels at 0.1 to 0.7 U/ml are 53 to 110 seconds.      Protime-INR [[id number]]  (Abnormal) Collected: 11/08/22 1040    Specimen: Blood Updated: 11/08/22 1131     Protime 27.7 Seconds      INR 2.52    Narrative:      Therapeutic Ranges for INR: 2.0-3.0 (PT 20-30)                              2.5-3.5 (PT 25-34)    Comprehensive Metabolic Panel [[id number]]  (Abnormal) Collected: 11/08/22 1040    Specimen: Blood Updated: 11/08/22 1113     Glucose 102 mg/dL      BUN 36 mg/dL      Creatinine 2.09 mg/dL      Sodium 142 mmol/L      Potassium 4.6 mmol/L      Chloride 105 mmol/L      CO2 28.1 mmol/L      Calcium 8.8 mg/dL      Total Protein 6.1 g/dL      Albumin 4.00 g/dL      ALT (SGPT) 7 U/L      AST (SGOT) 13 U/L      Alkaline Phosphatase 90 U/L      Total Bilirubin 0.3 mg/dL      Globulin 2.1 gm/dL      A/G Ratio 1.9 g/dL      BUN/Creatinine Ratio 17.2     Anion Gap 8.9 mmol/L      eGFR 21.9 mL/min/1.73      Comment: National Kidney Foundation and American Society of Nephrology (ASN) Task Force recommended calculation based on the Chronic Kidney Disease Epidemiology Collaboration (CKD-EPI) equation refit without adjustment for race.       Narrative:      GFR Normal >60  Chronic Kidney Disease <60  Kidney Failure <15    The  GFR formula is only valid for adults with stable renal function between ages 18 and 70.    CBC & Differential [[id number]]  (Abnormal) Collected: 11/08/22 1040    Specimen: Blood Updated: 11/08/22 1054    Narrative:      The following orders were created for panel order CBC & Differential.  Procedure                               Abnormality         Status                     ---------                               -----------         ------                     CBC Auto Differential[[id number]]        Abnormal            Final result               Scan Slide[[id number]]                                       In process                   Please view results for these tests on the individual orders.    CBC Auto Differential [[id number]]  (Abnormal) Collected: 11/08/22 1040    Specimen: Blood Updated: 11/08/22 1054     WBC 9.18 10*3/mm3      RBC 2.06 10*6/mm3      Hemoglobin 6.7 g/dL      Hematocrit 23.1 %      .1 fL      MCH 32.5 pg      MCHC 29.0 g/dL      RDW 19.4 %      RDW-SD 80.2 fl      MPV 10.6 fL      Platelets 298 10*3/mm3      Neutrophil % 63.7 %      Lymphocyte % 23.4 %      Monocyte % 10.2 %      Eosinophil % 2.2 %      Basophil % 0.3 %      Immature Grans % 0.2 %      Neutrophils, Absolute 5.84 10*3/mm3      Lymphocytes, Absolute 2.15 10*3/mm3      Monocytes, Absolute 0.94 10*3/mm3      Eosinophils, Absolute 0.20 10*3/mm3      Basophils, Absolute 0.03 10*3/mm3      Immature Grans, Absolute 0.02 10*3/mm3     Scan Slide [[id number]] Collected: 11/08/22 1040    Specimen: Blood Updated: 11/08/22 1048    Green Top (Gel) [[id number]] Collected: 11/08/22 1040    Specimen: Blood Updated: 11/08/22 1045    Light Blue Top [[id number]] Collected: 11/08/22 1040    Specimen: Blood Updated: 11/08/22 1045    [place] Draw [[id number]] Collected: 11/08/22 1040    Specimen: Blood Updated: 11/08/22 1045    Narrative:      The following orders were created for panel order [place] Draw.  Procedure                                Abnormality         Status                     ---------                               -----------         ------                     Green Top (Gel)[[id number]]                                  In process                 Lavender Top[[id number]]                                     In process                 Gold Top - SST[[id number]]                                                              Light Blue Top[[id number]]                                   In process                   Please view results for these tests on the individual orders.    Lavender Top [[id number]] Collected: 11/08/22 1040    Specimen: Blood Updated: 11/08/22 1045          Imaging Results (Most Recent)     Procedure Component Value Units Date/Time    XR Chest 2 View [[id number]] Collected: 11/08/22 1159     Updated: 11/08/22 1204    Narrative:      CHEST X-RAY, 11/8/2022         HISTORY:  89-year-old female in the ED complaining of one to two week history of  fatigue, weakness. Low hemoglobin.     TECHNIQUE:  PA and lateral upright chest series.     COMPARISON:  *  Chest x-ray, 5/26/2022.     FINDINGS:  Mild cardiomegaly is stable. Pulmonary vascularity is normal. Mild  basilar atelectasis. No visible airspace consolidation. Lateral image  shows likely tiny posterior pleural effusions.       Impression:      1. Stable mild cardiomegaly.  2. Suspected tiny posterior pleural effusions and mild basilar  atelectasis. Lungs otherwise clear.     This report was finalized on 11/8/2022 12:02 PM by Dr. Jose Alba MD.           reviewed    ECG/EMG Results (most recent)     None        Pending     Assessment & Plan    Acute GIB on xarelto  H/O colonic AVMs:  GERD: Consult GI  Allow clears  Transfuse PRBCs, likely 2 units  Hold Xarelto  Check anemia panels  Monitor    MARTA on CKD stage 3:   Previous baseline creatinine approximately 1.4, currently 2.09, yesterday 2.14  Recheck in a.m.    Right knee wound VAC:  Requested staff contact wound clinic to  "advise regarding need for wound VAC while here    Permanent a-fib:  CAD/HLD:  HTN:  ?h/O CHF:   Last echo with normal EF, generally unremarkable  Currently NSR on my exam, HRRR  BP at goal  Add home metoprolol tartrate as BP will allow  Add home amiodarone, check TSH  Hold home amlodipine    Severe left ICA stenosis s/p left CEA  PVD:  H/O mesenteric thrombosis:  No current acute issues    H/O DVT: last venous duplex 5/2022 negative for DVT  No current acute issues    OA:   Chronic right knee pain with chronic gait instability:  Right knee with wound VAC, as above  Falls precautions    I discussed the patient's findings and my recommendations with patient and daughter and staff.     Heidi Call, APRN  11/08/22  13:53 EST    \"Dictated utilizing Dragon dictation\"    Note disclaimer: At [facility], we believe that sharing information builds trust and better relationships. You are receiving this note because you recently visited [facility]. It is possible you will see health information before a provider has talked with you about it. This kind of information can be easy to misunderstand. To help you fully understand what it means for your health, we urge you to discuss this note with your provider.          Electronically signed by Fredi Krishnan MD at 11/08/22 1449         Lab Results (last 24 hours)     Procedure Component Value Units Date/Time    CBC & Differential [[id number]]  (Abnormal) Collected: 11/09/22 0638    Specimen: Blood Updated: 11/09/22 0806    Narrative:      The following orders were created for panel order CBC & Differential.  Procedure                               Abnormality         Status                     ---------                               -----------         ------                     CBC Auto Differential[[id number]]        Abnormal            Final result               Scan Slide[[id number]]                                       Final result                 Please view " results for these tests on the individual orders.    Scan Slide [[id number]] Collected: 11/09/22 0638    Specimen: Blood Updated: 11/09/22 0806     Anisocytosis Slight/1+     Macrocytes Slight/1+     WBC Morphology Normal     Platelet Morphology Normal    Basic Metabolic Panel [[id number]]  (Abnormal) Collected: 11/09/22 0638    Specimen: Blood Updated: 11/09/22 0712     Glucose 90 mg/dL      BUN 34 mg/dL      Creatinine 1.85 mg/dL      Sodium 141 mmol/L      Potassium 4.0 mmol/L      Chloride 105 mmol/L      CO2 25.8 mmol/L      Calcium 8.4 mg/dL      BUN/Creatinine Ratio 18.4     Anion Gap 10.2 mmol/L      eGFR 25.3 mL/min/1.73      Comment: National Kidney Foundation and American Society of Nephrology (ASN) Task Force recommended calculation based on the Chronic Kidney Disease Epidemiology Collaboration (CKD-EPI) equation refit without adjustment for race.       Narrative:      GFR Normal >60  Chronic Kidney Disease <60  Kidney Failure <15    The GFR formula is only valid for adults with stable renal function between ages 18 and 70.    CBC Auto Differential [[id number]]  (Abnormal) Collected: 11/09/22 0638    Specimen: Blood Updated: 11/09/22 0651     WBC 6.92 10*3/mm3      RBC 2.37 10*6/mm3      Hemoglobin 7.7 g/dL      Hematocrit 25.5 %      .6 fL      MCH 32.5 pg      MCHC 30.2 g/dL      RDW 21.4 %      RDW-SD 82.5 fl      MPV 10.8 fL      Platelets 239 10*3/mm3      Neutrophil % 56.0 %      Lymphocyte % 30.3 %      Monocyte % 10.4 %      Eosinophil % 2.9 %      Basophil % 0.3 %      Immature Grans % 0.1 %      Neutrophils, Absolute 3.87 10*3/mm3      Lymphocytes, Absolute 2.10 10*3/mm3      Monocytes, Absolute 0.72 10*3/mm3      Eosinophils, Absolute 0.20 10*3/mm3      Basophils, Absolute 0.02 10*3/mm3      Immature Grans, Absolute 0.01 10*3/mm3      nRBC 0.0 /100 WBC     Hemoglobin & Hematocrit, Blood [[id number]]  (Abnormal) Collected: 11/08/22 2212    Specimen: Blood Updated: 11/08/22 2217      "Hemoglobin 8.2 g/dL      Hematocrit 27.1 %     Reticulocytes [[id number]]  (Abnormal) Collected: 11/08/22 1040    Specimen: Blood Updated: 11/08/22 1744     Reticulocyte % 13.53 %      Reticulocyte Absolute 0.2733 10*6/mm3     TSH [[id number]]  (Abnormal) Collected: 11/08/22 1040    Specimen: Blood Updated: 11/08/22 1609     TSH 5.510 uIU/mL         Imaging Results (Last 24 Hours)     ** No results found for the last 24 hours. **        ECG/EMG Results (last 24 hours)     ** No results found for the last 24 hours. **        Operative/Procedure Notes (last 24 hours)  Notes from 11/08/22 1455 through 11/09/22 1455   No notes of this type exist for this encounter.            Physician Progress Notes (last 24 hours)      Heidi Call APRN at 11/09/22 0922          SERVICE: [facility] HOSPITALIST    CONSULTANTS: GI    CHIEF COMPLAINT: Follow-up GI bleed, acute blood loss anemia    SUBJECTIVE: Patient reports she is feeling well, tolerating diet, having bowel movements and no concerns overnight.  She is anxious to go home however understands that she will receive another unit of blood today.  She voices no new concerns at this time.    OBJECTIVE:    /53 (BP Location: Left arm, Patient Position: Lying)   Pulse 64   Temp 98.1 °F (36.7 °C) (Oral)   Resp 18   Ht 154.9 cm (61\")   Wt 75.7 kg (166 lb 12.8 oz)   SpO2 92%   BMI 31.52 kg/m²     MEDS/LABS REVIEWED AND ORDERED    cyanocobalamin, 1,000 mcg, Intramuscular, Daily  ferric gluconate, 125 mg, Intravenous, Daily  miconazole, , Topical, Q12H  sodium chloride, 10 mL, Intravenous, Q12H      Physical Exam  Vitals reviewed.   Constitutional:       General: She is not in acute distress.     Comments: Pale, elderly   HENT:      Head: Normocephalic and atraumatic.      Mouth/Throat:      Mouth: Mucous membranes are moist.      Comments: edentulous  Eyes:      Extraocular Movements: Extraocular movements intact.      Pupils: Pupils are equal, " round, and reactive to light.   Cardiovascular:      Rate and Rhythm: Normal rate and regular rhythm.   Pulmonary:      Effort: Pulmonary effort is normal. No respiratory distress.      Breath sounds: Normal breath sounds. No wheezing or rales.   Abdominal:      General: Abdomen is flat. Bowel sounds are normal. There is no distension.      Palpations: Abdomen is soft.      Tenderness: There is no abdominal tenderness. There is no guarding.   Skin:     General: Skin is warm and dry.      Capillary Refill: Capillary refill takes less than 2 seconds.      Findings: No erythema.      Comments: Right knee dressing intact   Neurological:      General: No focal deficit present.      Mental Status: She is alert and oriented to person, place, and time.   Psychiatric:         Mood and Affect: Mood normal.       LAB/DIAGNOSTICS:    Lab Results (last 24 hours)     Procedure Component Value Units Date/Time    CBC & Differential [[id number]]  (Abnormal) Collected: 11/09/22 0638    Specimen: Blood Updated: 11/09/22 0806    Narrative:      The following orders were created for panel order CBC & Differential.  Procedure                               Abnormality         Status                     ---------                               -----------         ------                     CBC Auto Differential[[id number]]        Abnormal            Final result               Scan Slide[[id number]]                                       Final result                 Please view results for these tests on the individual orders.    Scan Slide [[id number]] Collected: 11/09/22 0638    Specimen: Blood Updated: 11/09/22 0806     Anisocytosis Slight/1+     Macrocytes Slight/1+     WBC Morphology Normal     Platelet Morphology Normal    Basic Metabolic Panel [[id number]]  (Abnormal) Collected: 11/09/22 0638    Specimen: Blood Updated: 11/09/22 0712     Glucose 90 mg/dL      BUN 34 mg/dL      Creatinine 1.85 mg/dL      Sodium 141 mmol/L      Potassium 4.0  mmol/L      Chloride 105 mmol/L      CO2 25.8 mmol/L      Calcium 8.4 mg/dL      BUN/Creatinine Ratio 18.4     Anion Gap 10.2 mmol/L      eGFR 25.3 mL/min/1.73      Comment: National Kidney Foundation and American Society of Nephrology (ASN) Task Force recommended calculation based on the Chronic Kidney Disease Epidemiology Collaboration (CKD-EPI) equation refit without adjustment for race.       Narrative:      GFR Normal >60  Chronic Kidney Disease <60  Kidney Failure <15    The GFR formula is only valid for adults with stable renal function between ages 18 and 70.    CBC Auto Differential [[id number]]  (Abnormal) Collected: 11/09/22 0638    Specimen: Blood Updated: 11/09/22 0651     WBC 6.92 10*3/mm3      RBC 2.37 10*6/mm3      Hemoglobin 7.7 g/dL      Hematocrit 25.5 %      .6 fL      MCH 32.5 pg      MCHC 30.2 g/dL      RDW 21.4 %      RDW-SD 82.5 fl      MPV 10.8 fL      Platelets 239 10*3/mm3      Neutrophil % 56.0 %      Lymphocyte % 30.3 %      Monocyte % 10.4 %      Eosinophil % 2.9 %      Basophil % 0.3 %      Immature Grans % 0.1 %      Neutrophils, Absolute 3.87 10*3/mm3      Lymphocytes, Absolute 2.10 10*3/mm3      Monocytes, Absolute 0.72 10*3/mm3      Eosinophils, Absolute 0.20 10*3/mm3      Basophils, Absolute 0.02 10*3/mm3      Immature Grans, Absolute 0.01 10*3/mm3      nRBC 0.0 /100 WBC     Hemoglobin & Hematocrit, Blood [[id number]]  (Abnormal) Collected: 11/08/22 2212    Specimen: Blood Updated: 11/08/22 2217     Hemoglobin 8.2 g/dL      Hematocrit 27.1 %     Reticulocytes [[id number]]  (Abnormal) Collected: 11/08/22 1040    Specimen: Blood Updated: 11/08/22 1744     Reticulocyte % 13.53 %      Reticulocyte Absolute 0.2733 10*6/mm3     TSH [[id number]]  (Abnormal) Collected: 11/08/22 1040    Specimen: Blood Updated: 11/08/22 1609     TSH 5.510 uIU/mL     Iron Profile [[id number]]  (Abnormal) Collected: 11/08/22 1040    Specimen: Blood Updated: 11/08/22 1420     Iron 30 mcg/dL      Iron  Saturation 9 %      TIBC 344 mcg/dL      UIBC 314 mcg/dL     Ferritin [[id number]]  (Normal) Collected: 11/08/22 1040    Specimen: Blood Updated: 11/08/22 1420     Ferritin 84.00 ng/mL     Narrative:      Results may be falsely decreased if patient taking Biotin.      Urinalysis, Microscopic Only - Urine, Clean Catch [[id number]]  (Abnormal) Collected: 11/08/22 1159    Specimen: Urine, Clean Catch Updated: 11/08/22 1325     RBC, UA None Seen /HPF      WBC, UA 3-5 /HPF      Bacteria, UA 3+ /HPF      Squamous Epithelial Cells, UA 0-2 /HPF      Hyaline Casts, UA None Seen /LPF      Methodology Manual Light Microscopy    Urinalysis With Microscopic If Indicated (No Culture) - Urine, Clean Catch [[id number]]  (Abnormal) Collected: 11/08/22 1159    Specimen: Urine, Clean Catch Updated: 11/08/22 1319     Color, UA Other     Appearance, UA Slightly Cloudy     pH, UA 6.5     Specific Gravity, UA 1.010     Glucose, UA Negative     Ketones, UA Negative     Bilirubin, UA Negative     Blood, UA Trace     Protein, UA Negative     Leuk Esterase, UA Small (1+)     Nitrite, UA Negative     Urobilinogen, UA 0.2 E.U./dL    CBC & Differential [[id number]]  (Abnormal) Collected: 11/08/22 1040    Specimen: Blood Updated: 11/08/22 1208    Narrative:      The following orders were created for panel order CBC & Differential.  Procedure                               Abnormality         Status                     ---------                               -----------         ------                     CBC Auto Differential[[id number]]        Abnormal            Final result               Scan Slide[[id number]]                                       Final result                 Please view results for these tests on the individual orders.    Scan Slide [[id number]] Collected: 11/08/22 1040    Specimen: Blood Updated: 11/08/22 1208     Anisocytosis Slight/1+     Macrocytes Slight/1+     Polychromasia Slight/1+     WBC Morphology Normal     Platelet  Morphology Normal    [place] Draw [[id number]] Collected: 11/08/22 1040    Specimen: Blood Updated: 11/08/22 1145    Narrative:      The following orders were created for panel order [place] Draw.  Procedure                               Abnormality         Status                     ---------                               -----------         ------                     Green Top (Gel)[[id number]]                                  Final result               Lavender Top[[id number]]                                     Final result               Gold Top - SST[[id number]]                                                              Light Blue Top[[id number]]                                   Final result                 Please view results for these tests on the individual orders.    Green Top (Gel) [[id number]] Collected: 11/08/22 1040    Specimen: Blood Updated: 11/08/22 1145     Extra Tube Hold for add-ons.     Comment: Auto resulted.       Light Blue Top [[id number]] Collected: 11/08/22 1040    Specimen: Blood Updated: 11/08/22 1145     Extra Tube Hold for add-ons.     Comment: Auto resulted       Lavender Top [[id number]] Collected: 11/08/22 1040    Specimen: Blood Updated: 11/08/22 1145     Extra Tube hold for add-on     Comment: Auto resulted       aPTT [[id number]]  (Abnormal) Collected: 11/08/22 1040    Specimen: Blood Updated: 11/08/22 1131     PTT 59.7 seconds     Narrative:      PTT = The equivalent PTT values for the therapeutic range of heparin levels at 0.1 to 0.7 U/ml are 53 to 110 seconds.      Protime-INR [[id number]]  (Abnormal) Collected: 11/08/22 1040    Specimen: Blood Updated: 11/08/22 1131     Protime 27.7 Seconds      INR 2.52    Narrative:      Therapeutic Ranges for INR: 2.0-3.0 (PT 20-30)                              2.5-3.5 (PT 25-34)    Comprehensive Metabolic Panel [[id number]]  (Abnormal) Collected: 11/08/22 1040    Specimen: Blood Updated: 11/08/22 1113     Glucose 102 mg/dL      BUN 36 mg/dL       Creatinine 2.09 mg/dL      Sodium 142 mmol/L      Potassium 4.6 mmol/L      Chloride 105 mmol/L      CO2 28.1 mmol/L      Calcium 8.8 mg/dL      Total Protein 6.1 g/dL      Albumin 4.00 g/dL      ALT (SGPT) 7 U/L      AST (SGOT) 13 U/L      Alkaline Phosphatase 90 U/L      Total Bilirubin 0.3 mg/dL      Globulin 2.1 gm/dL      A/G Ratio 1.9 g/dL      BUN/Creatinine Ratio 17.2     Anion Gap 8.9 mmol/L      eGFR 21.9 mL/min/1.73      Comment: National Kidney Foundation and American Society of Nephrology (ASN) Task Force recommended calculation based on the Chronic Kidney Disease Epidemiology Collaboration (CKD-EPI) equation refit without adjustment for race.       Narrative:      GFR Normal >60  Chronic Kidney Disease <60  Kidney Failure <15    The GFR formula is only valid for adults with stable renal function between ages 18 and 70.    CBC Auto Differential [[id number]]  (Abnormal) Collected: 11/08/22 1040    Specimen: Blood Updated: 11/08/22 1054     WBC 9.18 10*3/mm3      RBC 2.06 10*6/mm3      Hemoglobin 6.7 g/dL      Hematocrit 23.1 %      .1 fL      MCH 32.5 pg      MCHC 29.0 g/dL      RDW 19.4 %      RDW-SD 80.2 fl      MPV 10.6 fL      Platelets 298 10*3/mm3      Neutrophil % 63.7 %      Lymphocyte % 23.4 %      Monocyte % 10.2 %      Eosinophil % 2.2 %      Basophil % 0.3 %      Immature Grans % 0.2 %      Neutrophils, Absolute 5.84 10*3/mm3      Lymphocytes, Absolute 2.15 10*3/mm3      Monocytes, Absolute 0.94 10*3/mm3      Eosinophils, Absolute 0.20 10*3/mm3      Basophils, Absolute 0.03 10*3/mm3      Immature Grans, Absolute 0.02 10*3/mm3         No orders to display     Results for orders placed during the hospital encounter of 02/28/18    Adult Transthoracic Echo Complete W/ Cont if Necessary Per Protocol    Interpretation Summary  · Left atrial cavity size is mildly dilated.  · There is calcification of the aortic valve.  · Mild-to-moderate mitral valve regurgitation is present  · Mild tricuspid  "valve regurgitation is present.  · Left Ventricle: Calculated EF = 62.9%.  · There is no evidence of pericardial effusion    XR Chest 2 View    Result Date: 11/8/2022  1. Stable mild cardiomegaly. 2. Suspected tiny posterior pleural effusions and mild basilar atelectasis. Lungs otherwise clear.  This report was finalized on 11/8/2022 12:02 PM by Dr. Jose Alba MD.      ASSESSMENT/PLAN:  Acute blood loss anemia secondary to GI bleed on Xarelto:  GERD: GI following  Transfusing second unit PRBCs, continues Protonix drip, Ferrlecit daily  If hemoglobin stable tomorrow, anticipate discharge home at that time    MARTA on CKD stage III:  Baseline creatinine approximately 1.4, high as 2.14, currently down to 1.85  Recheck in a.m.    Right knee wound with wound VAC: Wound RN managing    Permanent A. Fib:  CAD/HLD:  Hypertension:  Possible history of D CHF:  Rate and rhythm remain at goal, BP low goal  Blood pressure at goal on home metoprolol, amiodarone  Continue to hold amlodipine  Monitor    Severe left ICA stenosis status post left CEA:  PVD:  History of mesenteric thrombosis:  Nothing acute currently    OA:  Chronic right knee pain, chronic gait instability: As above wound RN managing  Continue falls precautions    PLAN FOR DISPOSITION: Anticipate discharge home tomorrow    CANDELARIO Montes  Hospitalist, [facility]  11/09/22  09:22 EST    Note Disclaimer: At [facility], we believe that sharing information builds trust and better relationships. You are receiving this note because you recently visited [facility]. It is possible you will see health information before a provider has talked with you about it. This kind of information can be easy to misunderstand. To help you fully understand what it means for your health, we urge you to discuss this note with your provider.     \"Dictated utilizing Dragon dictation\"        Electronically signed by Heidi Call APRN at 11/09/22 1047   "        Consult Notes (last 24 hours)      Emiliano Rodriguez MD at 11/08/22 1720      Consult Orders    1. Inpatient Gastroenterology Consult [[id number]] ordered by Heidi Call APRN at 11/08/22 1140               Patient Care Team:  Jimena Aj MD as PCP - General (Family Medicine)    CHIEF COMPLAINT: Anemia    HISTORY OF PRESENT ILLNESS:  89 yo presents due to symptomatic anemia, she has history of DVT and LICS/CEA and is maintained on AC, she also has an active wound that was ind=fected and bleeding recently but has been improving with active wound care. She denies seeing any active bleeding but has had this presentation before and was actually scoped in 2018 by Dr Carroll but the findings were minimal. Presently she has a low iron saturation but also a low B12 and elevated MCV (112). She has received a unit today and will await her repeat labs but feel this 3 gram drop from her baseline of 9 is subacute/ multifactorial. With out an obvious active bleed would avoid putting her through endoscopy.     As per Taylor...The patient is a 89-year-old female who presented to the emergency department with her daughter from assisted living, secondary to 2 weeks of generalized weakness, mild shortness of air with exertion.  She notes having blood drawn yesterday noting she was anemic and was told to come to ER today.  She notes stools have been dark however attributed this to taking iron. She denies bloody stools and notes she has a great appetite and no pain concerns. She reports that she was doing extremely well, continues with right knee area wound VAC managed by wound clinic at this facility.  Daughter reports she was nearly to be released from wound care and patient reports absolutely no sick symptoms whatsoever recently.     Diagnostics in ER showed grossly positive Hemoccult stool, creatinine 2.09, hemoglobin 6.7.  She was given IV fluid bolus and single dose of Protonix.  1 unit PRBCs has been  ordered.     She has a history of Atrial fibrillation, DVT on xarelto h/o dCHF, CAD/HLD, HTN, CKD, mesenteric thrombosis, colitis, PVD, OA, GERD, chronic right knee pain with gait instability, GIB on warfarin and xarelto in past, severe left ICA stenosis s/p left CEA, right leg hematoma..    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)    • On anticoagulant therapy    • Osteoarthritis    • Polycythemia    • PVD (peripheral vascular disease) (HCC)      Past Surgical History:   Procedure Laterality Date   • APPENDECTOMY     • CAROTID ENDARTERECTOMY Left    • COLONOSCOPY N/A 03/03/2018    Procedure:  Colonoscopy to Terminal ileum with APC treatment for 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 and cold biopsy;  Surgeon: Terrie Carroll MD;  Location: [facility] ENDOSCOPY;  Service:    • ENDOSCOPY N/A 03/03/2018    Procedure: EGD with biopsy;  Surgeon: Terrie Carroll MD;  Location: [facility] ENDOSCOPY;  Service:    • KNEE ARTHROSCOPY Right      History reviewed. No pertinent family history.  Social History     Tobacco Use   • Smoking status: Never   • Smokeless tobacco: Never   Vaping Use   • Vaping Use: Never used   Substance Use Topics   • Alcohol use: No   • Drug use: No     Medications Prior to Admission   Medication Sig Dispense Refill Last Dose   • alendronate (FOSAMAX) 70 MG tablet Take 70 mg by mouth Every 7 (Seven) Days.   Past Week   • amiodarone (PACERONE) 200 MG tablet Take 200 mg by mouth Daily.   11/8/2022   • amLODIPine (NORVASC) 5 MG tablet Take 5 mg by mouth Daily.   11/8/2022   • aspirin 81 MG chewable tablet Chew 81 mg Daily.   11/8/2022   • Calcium Carb-Cholecalciferol (OYSTER SHELL  CALCIUM/VITAMIN D) 250-125 MG-UNIT tablet tablet Take 2 tablets by mouth 2 (Two) Times a Day.   11/8/2022   • cholecalciferol (VITAMIN D3) 400 units tablet Take 400 Units by mouth 2 (Two) Times a Day.   11/8/2022   • docusate sodium (COLACE) 100 MG capsule Take 1 capsule by mouth 2 (Two) Times a Day As Needed.   Patient Taking Differently   • ferrous sulfate 325 (65 FE) MG tablet Take 325 mg by mouth Daily With Breakfast.   11/8/2022   • gabapentin (NEURONTIN) 100 MG capsule Take 100 mg by mouth 2 (Two) Times a Day.   11/8/2022   • metoprolol tartrate (LOPRESSOR) 25 MG tablet Take 12.5 mg by mouth 2 (Two) Times a Day.   11/8/2022   • mirtazapine (REMERON) 15 MG tablet Take 15 mg by mouth Every Night.   11/7/2022   • omeprazole (priLOSEC) 20 MG capsule Take 20 mg by mouth Daily.   11/8/2022   • rosuvastatin (CRESTOR) 5 MG tablet Take 5 mg by mouth Daily.   11/8/2022   • acetaminophen (TYLENOL) 325 MG tablet Take 2 tablets by mouth Every 4 (Four) Hours As Needed for Mild Pain .      • doxycycline 100 mg in sodium chloride 0.9 % 100 mL IVPB Infuse 100 mg into a venous catheter 2 (Two) Times a Day. (Patient not taking: Reported on 11/8/2022)   Not Taking   • Morphine (MSIR) 15 MG tablet  (Patient not taking: Reported on 11/8/2022)   Not Taking   • ondansetron (ZOFRAN) 4 MG tablet Take 4 mg by mouth Every 4 (Four) Hours As Needed for Nausea or Vomiting.      • vancomycin (VANCOCIN) 1 g injection       • venlafaxine (EFFEXOR) 75 MG tablet  (Patient not taking: Reported on 11/8/2022)   Not Taking   • Xarelto 20 MG tablet 1 tablet Daily.        Allergies:  Clinoril [sulindac], Codeine, Coumadin [warfarin sodium], Ibuprofen-oxycodone [oxycodone-ibuprofen], Keflex [cephalexin], Mydriacyl [tropicamide], Don-synephrine [phenylephrine], Penicillins, Phenylephrine hcl, Sulfa antibiotics, and Zantac [ranitidine hcl]    REVIEW OF SYSTEMS:  Please see the above history of present illness for pertinent positives and negatives.  The  "remainder of the patient's systems have been reviewed and are negative.     Vital Signs  Temp:  [97.1 °F (36.2 °C)-97.8 °F (36.6 °C)] 97.1 °F (36.2 °C)  Heart Rate:  [59-65] 60  Resp:  [18] 18  BP: ()/(45-85) 108/48    Flowsheet Rows    Flowsheet Row First Filed Value   Admission Height 154.9 cm (61\") Documented at 11/08/2022 1027   Admission Weight 79.9 kg (176 lb 1.6 oz) Documented at 11/08/2022 1027           Physical Exam:  Physical Exam   Constitutional: Patient appears well-developed and well-nourished and in no acute distress   HEENT:   Head: Normocephalic and atraumatic.   Eyes:  Pupils are equal, round, and reactive to light. EOM are intact. Sclerae are anicteric and non-injected.  Mouth and Throat: Patient has moist mucous membranes. Oropharynx is clear of any erythema or exudate.     Neck: Neck supple. No JVD present. No thyromegaly present. No lymphadenopathy present.  Cardiovascular: Regular rate, regular rhythm, S1 normal and S2 normal.  Exam reveals no gallop and no friction rub.  No murmur heard.  Pulmonary/Chest: Lungs are clear to auscultation bilaterally. No respiratory distress. No wheezes. No rhonchi. No rales.   Abdominal: Soft. Bowel sounds are normal. No distension and no mass. There is no hepatosplenomegaly. There is NO tenderness.   Musculoskeletal: Normal Muscle tone  Extremities: No edema. Pulses are palpable in all 4 extremities.  Neurological: Patient is alert and oriented to person, place, and time. Cranial nerves II-XII are grossly intact with no focal deficits.  Skin: Skin is warm. No rash noted. Nails show no clubbing.  No cyanosis or erythema.    Debilities/Disabilities Identified: None  Emotional Behavior: Appropriate     Results Review:   I reviewed the patient's new clinical results.    Lab Results (most recent)     Procedure Component Value Units Date/Time    TSH [[id number]]  (Abnormal) Collected: 11/08/22 1040    Specimen: Blood Updated: 11/08/22 1609     TSH 5.510 " uIU/mL     Iron Profile [[id number]]  (Abnormal) Collected: 11/08/22 1040    Specimen: Blood Updated: 11/08/22 1420     Iron 30 mcg/dL      Iron Saturation 9 %      TIBC 344 mcg/dL      UIBC 314 mcg/dL     Ferritin [[id number]]  (Normal) Collected: 11/08/22 1040    Specimen: Blood Updated: 11/08/22 1420     Ferritin 84.00 ng/mL     Narrative:      Results may be falsely decreased if patient taking Biotin.      Urinalysis, Microscopic Only - Urine, Clean Catch [[id number]]  (Abnormal) Collected: 11/08/22 1159    Specimen: Urine, Clean Catch Updated: 11/08/22 1325     RBC, UA None Seen /HPF      WBC, UA 3-5 /HPF      Bacteria, UA 3+ /HPF      Squamous Epithelial Cells, UA 0-2 /HPF      Hyaline Casts, UA None Seen /LPF      Methodology Manual Light Microscopy    Urinalysis With Microscopic If Indicated (No Culture) - Urine, Clean Catch [[id number]]  (Abnormal) Collected: 11/08/22 1159    Specimen: Urine, Clean Catch Updated: 11/08/22 1319     Color, UA Other     Appearance, UA Slightly Cloudy     pH, UA 6.5     Specific Gravity, UA 1.010     Glucose, UA Negative     Ketones, UA Negative     Bilirubin, UA Negative     Blood, UA Trace     Protein, UA Negative     Leuk Esterase, UA Small (1+)     Nitrite, UA Negative     Urobilinogen, UA 0.2 E.U./dL    CBC & Differential [[id number]]  (Abnormal) Collected: 11/08/22 1040    Specimen: Blood Updated: 11/08/22 1208    Narrative:      The following orders were created for panel order CBC & Differential.  Procedure                               Abnormality         Status                     ---------                               -----------         ------                     CBC Auto Differential[[id number]]        Abnormal            Final result               Scan Slide[[id number]]                                       Final result                 Please view results for these tests on the individual orders.    Scan Slide [[id number]] Collected: 11/08/22 1040    Specimen: Blood  Updated: 11/08/22 1208     Anisocytosis Slight/1+     Macrocytes Slight/1+     Polychromasia Slight/1+     WBC Morphology Normal     Platelet Morphology Normal    [place] Draw [[id number]] Collected: 11/08/22 1040    Specimen: Blood Updated: 11/08/22 1145    Narrative:      The following orders were created for panel order [place] Draw.  Procedure                               Abnormality         Status                     ---------                               -----------         ------                     Green Top (Gel)[[id number]]                                  Final result               Lavender Top[[id number]]                                     Final result               Gold Top - SST[[id number]]                                                              Light Blue Top[[id number]]                                   Final result                 Please view results for these tests on the individual orders.    Green Top (Gel) [[id number]] Collected: 11/08/22 1040    Specimen: Blood Updated: 11/08/22 1145     Extra Tube Hold for add-ons.     Comment: Auto resulted.       Light Blue Top [[id number]] Collected: 11/08/22 1040    Specimen: Blood Updated: 11/08/22 1145     Extra Tube Hold for add-ons.     Comment: Auto resulted       Lavender Top [[id number]] Collected: 11/08/22 1040    Specimen: Blood Updated: 11/08/22 1145     Extra Tube hold for add-on     Comment: Auto resulted       aPTT [[id number]]  (Abnormal) Collected: 11/08/22 1040    Specimen: Blood Updated: 11/08/22 1131     PTT 59.7 seconds     Narrative:      PTT = The equivalent PTT values for the therapeutic range of heparin levels at 0.1 to 0.7 U/ml are 53 to 110 seconds.      Protime-INR [[id number]]  (Abnormal) Collected: 11/08/22 1040    Specimen: Blood Updated: 11/08/22 1131     Protime 27.7 Seconds      INR 2.52    Narrative:      Therapeutic Ranges for INR: 2.0-3.0 (PT 20-30)                              2.5-3.5 (PT 25-34)    Comprehensive Metabolic  Panel [[id number]]  (Abnormal) Collected: 11/08/22 1040    Specimen: Blood Updated: 11/08/22 1113     Glucose 102 mg/dL      BUN 36 mg/dL      Creatinine 2.09 mg/dL      Sodium 142 mmol/L      Potassium 4.6 mmol/L      Chloride 105 mmol/L      CO2 28.1 mmol/L      Calcium 8.8 mg/dL      Total Protein 6.1 g/dL      Albumin 4.00 g/dL      ALT (SGPT) 7 U/L      AST (SGOT) 13 U/L      Alkaline Phosphatase 90 U/L      Total Bilirubin 0.3 mg/dL      Globulin 2.1 gm/dL      A/G Ratio 1.9 g/dL      BUN/Creatinine Ratio 17.2     Anion Gap 8.9 mmol/L      eGFR 21.9 mL/min/1.73      Comment: National Kidney Foundation and American Society of Nephrology (ASN) Task Force recommended calculation based on the Chronic Kidney Disease Epidemiology Collaboration (CKD-EPI) equation refit without adjustment for race.       Narrative:      GFR Normal >60  Chronic Kidney Disease <60  Kidney Failure <15    The GFR formula is only valid for adults with stable renal function between ages 18 and 70.    CBC Auto Differential [[id number]]  (Abnormal) Collected: 11/08/22 1040    Specimen: Blood Updated: 11/08/22 1054     WBC 9.18 10*3/mm3      RBC 2.06 10*6/mm3      Hemoglobin 6.7 g/dL      Hematocrit 23.1 %      .1 fL      MCH 32.5 pg      MCHC 29.0 g/dL      RDW 19.4 %      RDW-SD 80.2 fl      MPV 10.6 fL      Platelets 298 10*3/mm3      Neutrophil % 63.7 %      Lymphocyte % 23.4 %      Monocyte % 10.2 %      Eosinophil % 2.2 %      Basophil % 0.3 %      Immature Grans % 0.2 %      Neutrophils, Absolute 5.84 10*3/mm3      Lymphocytes, Absolute 2.15 10*3/mm3      Monocytes, Absolute 0.94 10*3/mm3      Eosinophils, Absolute 0.20 10*3/mm3      Basophils, Absolute 0.03 10*3/mm3      Immature Grans, Absolute 0.02 10*3/mm3           Imaging Results (Most Recent)     Procedure Component Value Units Date/Time    XR Chest 2 View [[id number]] Collected: 11/08/22 1159     Updated: 11/08/22 1204    Narrative:      CHEST X-RAY, 11/8/2022          HISTORY:  89-year-old female in the ED complaining of one to two week history of  fatigue, weakness. Low hemoglobin.     TECHNIQUE:  PA and lateral upright chest series.     COMPARISON:  *  Chest x-ray, 5/26/2022.     FINDINGS:  Mild cardiomegaly is stable. Pulmonary vascularity is normal. Mild  basilar atelectasis. No visible airspace consolidation. Lateral image  shows likely tiny posterior pleural effusions.       Impression:      1. Stable mild cardiomegaly.  2. Suspected tiny posterior pleural effusions and mild basilar  atelectasis. Lungs otherwise clear.     This report was finalized on 11/8/2022 12:02 PM by Dr. Jose Alba MD.           reviewed    ECG/EMG Results (most recent)     None        reviewed    Assessment & Plan   Anemia   Coagulopathy  RLE wound  Heme positive Stool  B12 deficiency  Iron Deficiency  PAD  AFIB/DVT    DUe to multiple comorbidities and her apparent multifactorial anemia will replace Iron , B12 and hold AC for now but do not feel the risk of sedation and endoscopy outweighs the risk at this time. WIll subsequently increase her diet and follow along.        I discussed the patient's findings and my recommendations with patient.     Emiliano Rodriguez MD  11/08/22  17:20 EST    Time: 10 min prior to procedure.      Electronically signed by Emiliano Rodriguez MD at 11/08/22 2411

## 2022-11-09 NOTE — PLAN OF CARE
Goal Outcome Evaluation:              Outcome Evaluation: Pt in good spirits, ready to go home. Given 1u of blood today and tolerated well. Continues NSR on tele today. Stand by assist when ambulating, she uses walker at home. Wound nurse saw patient and applied Karen dressing this afternoon and will follow up with wound care after discharge. Pt continuing with Protonix drip.

## 2022-11-10 VITALS
SYSTOLIC BLOOD PRESSURE: 117 MMHG | RESPIRATION RATE: 16 BRPM | HEART RATE: 69 BPM | TEMPERATURE: 96.4 F | BODY MASS INDEX: 31.49 KG/M2 | DIASTOLIC BLOOD PRESSURE: 56 MMHG | HEIGHT: 61 IN | WEIGHT: 166.8 LBS | OXYGEN SATURATION: 92 %

## 2022-11-10 LAB
ANION GAP SERPL CALCULATED.3IONS-SCNC: 7.5 MMOL/L (ref 5–15)
BASOPHILS # BLD AUTO: 0.02 10*3/MM3 (ref 0–0.2)
BASOPHILS NFR BLD AUTO: 0.3 % (ref 0–1.5)
BH BB BLOOD EXPIRATION DATE: NORMAL
BH BB BLOOD TYPE BARCODE: 9500
BH BB DISPENSE STATUS: NORMAL
BH BB PRODUCT CODE: NORMAL
BH BB UNIT NUMBER: NORMAL
BUN SERPL-MCNC: 23 MG/DL (ref 8–23)
BUN/CREAT SERPL: 15.5 (ref 7–25)
CALCIUM SPEC-SCNC: 8.6 MG/DL (ref 8.2–9.6)
CHLORIDE SERPL-SCNC: 106 MMOL/L (ref 98–107)
CO2 SERPL-SCNC: 26.5 MMOL/L (ref 22–29)
CREAT SERPL-MCNC: 1.48 MG/DL (ref 0.57–1)
CROSSMATCH INTERPRETATION: NORMAL
DEPRECATED RDW RBC AUTO: 83.9 FL (ref 37–54)
EGFRCR SERPLBLD CKD-EPI 2021: 33.1 ML/MIN/1.73
EOSINOPHIL # BLD AUTO: 0.3 10*3/MM3 (ref 0–0.4)
EOSINOPHIL NFR BLD AUTO: 4 % (ref 0.3–6.2)
ERYTHROCYTE [DISTWIDTH] IN BLOOD BY AUTOMATED COUNT: 23.3 % (ref 12.3–15.4)
GLUCOSE SERPL-MCNC: 88 MG/DL (ref 65–99)
HCT VFR BLD AUTO: 31.4 % (ref 34–46.6)
HGB BLD-MCNC: 9.7 G/DL (ref 12–15.9)
IMM GRANULOCYTES # BLD AUTO: 0.03 10*3/MM3 (ref 0–0.05)
IMM GRANULOCYTES NFR BLD AUTO: 0.4 % (ref 0–0.5)
LYMPHOCYTES # BLD AUTO: 1.97 10*3/MM3 (ref 0.7–3.1)
LYMPHOCYTES NFR BLD AUTO: 26.2 % (ref 19.6–45.3)
MCH RBC QN AUTO: 31.1 PG (ref 26.6–33)
MCHC RBC AUTO-ENTMCNC: 30.9 G/DL (ref 31.5–35.7)
MCV RBC AUTO: 100.6 FL (ref 79–97)
MONOCYTES # BLD AUTO: 0.89 10*3/MM3 (ref 0.1–0.9)
MONOCYTES NFR BLD AUTO: 11.8 % (ref 5–12)
NEUTROPHILS NFR BLD AUTO: 4.31 10*3/MM3 (ref 1.7–7)
NEUTROPHILS NFR BLD AUTO: 57.3 % (ref 42.7–76)
PLATELET # BLD AUTO: 260 10*3/MM3 (ref 140–450)
PMV BLD AUTO: 10.7 FL (ref 6–12)
POTASSIUM SERPL-SCNC: 3.8 MMOL/L (ref 3.5–5.2)
RBC # BLD AUTO: 3.12 10*6/MM3 (ref 3.77–5.28)
SODIUM SERPL-SCNC: 140 MMOL/L (ref 136–145)
T4 FREE SERPL-MCNC: 1.26 NG/DL (ref 0.93–1.7)
UNIT  ABO: NORMAL
UNIT  RH: NORMAL
WBC NRBC COR # BLD: 7.52 10*3/MM3 (ref 3.4–10.8)

## 2022-11-10 PROCEDURE — 25010000002 NA FERRIC GLUC CPLX PER 12.5 MG: Performed by: INTERNAL MEDICINE

## 2022-11-10 PROCEDURE — 96366 THER/PROPH/DIAG IV INF ADDON: CPT

## 2022-11-10 PROCEDURE — G0378 HOSPITAL OBSERVATION PER HR: HCPCS

## 2022-11-10 PROCEDURE — 96372 THER/PROPH/DIAG INJ SC/IM: CPT

## 2022-11-10 PROCEDURE — 84439 ASSAY OF FREE THYROXINE: CPT | Performed by: NURSE PRACTITIONER

## 2022-11-10 PROCEDURE — 85025 COMPLETE CBC W/AUTO DIFF WBC: CPT | Performed by: NURSE PRACTITIONER

## 2022-11-10 PROCEDURE — 80048 BASIC METABOLIC PNL TOTAL CA: CPT | Performed by: NURSE PRACTITIONER

## 2022-11-10 PROCEDURE — 99217 PR OBSERVATION CARE DISCHARGE MANAGEMENT: CPT | Performed by: NURSE PRACTITIONER

## 2022-11-10 PROCEDURE — 25010000002 CYANOCOBALAMIN PER 1000 MCG: Performed by: INTERNAL MEDICINE

## 2022-11-10 RX ORDER — GABAPENTIN 100 MG/1
100 CAPSULE ORAL 2 TIMES DAILY
Status: DISCONTINUED | OUTPATIENT
Start: 2022-11-10 | End: 2022-11-10 | Stop reason: HOSPADM

## 2022-11-10 RX ORDER — PANTOPRAZOLE SODIUM 40 MG/1
40 TABLET, DELAYED RELEASE ORAL DAILY
Qty: 30 TABLET | Refills: 1 | Status: SHIPPED | OUTPATIENT
Start: 2022-11-10

## 2022-11-10 RX ORDER — MIRTAZAPINE 15 MG/1
15 TABLET, FILM COATED ORAL NIGHTLY
Status: DISCONTINUED | OUTPATIENT
Start: 2022-11-10 | End: 2022-11-10 | Stop reason: HOSPADM

## 2022-11-10 RX ADMIN — MIRTAZAPINE 15 MG: 15 TABLET, FILM COATED ORAL at 01:26

## 2022-11-10 RX ADMIN — SODIUM CHLORIDE 125 MG: 9 INJECTION, SOLUTION INTRAVENOUS at 09:09

## 2022-11-10 RX ADMIN — MICONAZOLE NITRATE: 20 POWDER TOPICAL at 09:09

## 2022-11-10 RX ADMIN — GABAPENTIN 100 MG: 100 CAPSULE ORAL at 01:26

## 2022-11-10 RX ADMIN — SODIUM CHLORIDE 8 MG/HR: 900 INJECTION INTRAVENOUS at 06:30

## 2022-11-10 RX ADMIN — CYANOCOBALAMIN 1000 MCG: 1000 INJECTION, SOLUTION INTRAMUSCULAR; SUBCUTANEOUS at 09:09

## 2022-11-10 NOTE — PLAN OF CARE
Problem: Adult Inpatient Plan of Care  Goal: Plan of Care Review  Outcome: Ongoing, Progressing  Flowsheets (Taken 11/10/2022 0524)  Progress: improving  Plan of Care Reviewed With: patient  Outcome Evaluation: Pt's Hgb better this AM. C/O Headache last night with no relief with tylenol- MD restarted some home meds. Powder tp groin area. No S/S of active bleeding. Protonix gtt Infusing.   Goal Outcome Evaluation:  Plan of Care Reviewed With: patient        Progress: improving  Outcome Evaluation: Pt's Hgb better this AM. C/O Headache last night with no relief with tylenol- MD restarted some home meds. Powder tp groin area. No S/S of active bleeding. Protonix gtt Infusing.

## 2022-11-10 NOTE — DISCHARGE SUMMARY
"Aria Fernando  9/27/1930  [id number]    Hospitalists Discharge Summary    Date of Admission: 11/8/2022  Date of Discharge:  11/10/2022    History of Present Illness from [facility] on admit:   \"The patient is a 89-year-old female who presented to the emergency department with her daughter from assisted living, secondary to 2 weeks of generalized weakness, mild shortness of air with exertion.  She notes having blood drawn yesterday noting she was anemic and was told to come to ER today.  She notes stools have been dark however attributed this to taking iron. She denies bloody stools and notes she has a great appetite and no pain concerns. She reports that she was doing extremely well, continues with right knee area wound VAC managed by wound clinic at this facility.  Daughter reports she was nearly to be released from wound care and patient reports absolutely no sick symptoms whatsoever recently.     Diagnostics in ER showed grossly positive Hemoccult stool, creatinine 2.09, hemoglobin 6.7.  She was given IV fluid bolus and single dose of Protonix.  1 unit PRBCs has been ordered.     She has a history of Atrial fibrillation, DVT on xarelto h/o dCHF, CAD/HLD, HTN, CKD, mesenteric thrombosis, colitis, PVD, OA, GERD, chronic right knee pain with gait instability, GIB on warfarin and xarelto in past, severe left ICA stenosis s/p left CEA, right leg hematoma.     She otherwise denies f/c/headache/rhinorrhea/nasal congestion/lightheadedness/syncopal sensation/cough/n/v/d/chest pain/abdominal pain/recent illness/sick exposures/change in bowel or bladder habits/no weight change/bloody emesis or bloody stools/change in medications or any other new concerns.\"    Primary Discharge diagnoses:  Acute blood loss anemia secondary to GI bleed on Xarelto  GERD   MARTA on CKD stage III    Secondary Discharge Diagnoses:    Right knee wound with wound VAC   Permanent A. Fib  CAD/HLD  Hypertension  Possible history of D CHF  Severe left ICA " stenosis status post left CEA  PVD  History of mesenteric thrombosis   OA  Chronic right knee pain, chronic gait instability    Hospital Course Summary:   The patient was followed in consultation by GI with conservative measures initiated with protonix drip, ferrlicit infusion, B12 injections and protonix drip with hgb 9.7 today. Regarding MARTA, creatinine returned to suspected baseline at 1.48 today, and patient will f/u Jimena Aj MD within 1 week. Per GI, patient is to hold xarelto and aspirin until hemoglobin over 10.0, and can be resumed by pcp when able. She remained NSR on each exam.   F/U GI as needed  F/U Jimena Aj MD within 1 week    PCP  Patient Care Team:  Jimena Aj MD as PCP - General (Family Medicine)    Consults:   Consults     Date and Time Order Name Status Description    11/8/2022  1:26 PM Inpatient Gastroenterology Consult Completed         Operations and Procedures Performed:     XR Chest 2 View    Result Date: 11/8/2022  Narrative: CHEST X-RAY, 11/8/2022     HISTORY: 89-year-old female in the ED complaining of one to two week history of fatigue, weakness. Low hemoglobin.  TECHNIQUE: PA and lateral upright chest series.  COMPARISON: *  Chest x-ray, 5/26/2022.  FINDINGS: Mild cardiomegaly is stable. Pulmonary vascularity is normal. Mild basilar atelectasis. No visible airspace consolidation. Lateral image shows likely tiny posterior pleural effusions.      Impression: 1. Stable mild cardiomegaly. 2. Suspected tiny posterior pleural effusions and mild basilar atelectasis. Lungs otherwise clear.  This report was finalized on 11/8/2022 12:02 PM by Dr. Jose Alba MD.      Allergies:  is allergic to clinoril [sulindac], codeine, coumadin [warfarin sodium], ibuprofen-oxycodone [oxycodone-ibuprofen], keflex [cephalexin], mydriacyl [tropicamide], ben-synephrine [phenylephrine], penicillins, phenylephrine hcl, sulfa antibiotics, and zantac [ranitidine  hcl].    Jah  Gabapentin 10/2022 per report, reviewed by me    Discharge Medications:     Discharge Medications      New Medications      Instructions Start Date   miconazole 2 % powder  Commonly known as: MICOTIN   Topical, Every 12 Hours Scheduled      pantoprazole 40 MG EC tablet  Commonly known as: Protonix   40 mg, Oral, Daily         Continue These Medications      Instructions Start Date   acetaminophen 325 MG tablet  Commonly known as: TYLENOL   650 mg, Oral, Every 4 Hours PRN      amiodarone 200 MG tablet  Commonly known as: PACERONE   200 mg, Oral, Daily      cholecalciferol 10 MCG (400 UNIT) tablet  Commonly known as: VITAMIN D3   400 Units, Oral, 2 Times Daily      docusate sodium 100 MG capsule  Commonly known as: COLACE   100 mg, Oral, 2 Times Daily PRN      ferrous sulfate 325 (65 FE) MG tablet   325 mg, Oral, Daily With Breakfast      gabapentin 100 MG capsule  Commonly known as: NEURONTIN   100 mg, Oral, 2 Times Daily      mirtazapine 15 MG tablet  Commonly known as: REMERON   15 mg, Oral, Nightly      oyster shell calcium/vitamin d 250-125 MG-UNIT tablet tablet   2 tablets, Oral, 2 Times Daily      rosuvastatin 5 MG tablet  Commonly known as: CRESTOR   5 mg, Oral, Daily         Stop These Medications    alendronate 70 MG tablet  Commonly known as: FOSAMAX     amLODIPine 5 MG tablet  Commonly known as: NORVASC     aspirin 81 MG chewable tablet     doxycycline 100 mg in sodium chloride 0.9 % 100 mL IVPB     metoprolol tartrate 25 MG tablet  Commonly known as: LOPRESSOR     Morphine 15 MG tablet  Commonly known as: MSIR     omeprazole 20 MG capsule  Commonly known as: priLOSEC     ondansetron 4 MG tablet  Commonly known as: ZOFRAN     vancomycin 1 g injection  Commonly known as: VANCOCIN     venlafaxine 75 MG tablet  Commonly known as: EFFEXOR     Xarelto 20 MG tablet  Generic drug: rivaroxaban            Last Lab Results:   Lab Results (most recent)     Procedure Component Value Units Date/Time     Basic Metabolic Panel [[id number]]  (Abnormal) Collected: 11/10/22 0356    Specimen: Blood Updated: 11/10/22 0507     Glucose 88 mg/dL      BUN 23 mg/dL      Creatinine 1.48 mg/dL      Sodium 140 mmol/L      Potassium 3.8 mmol/L      Chloride 106 mmol/L      CO2 26.5 mmol/L      Calcium 8.6 mg/dL      BUN/Creatinine Ratio 15.5     Anion Gap 7.5 mmol/L      eGFR 33.1 mL/min/1.73      Comment: National Kidney Foundation and American Society of Nephrology (ASN) Task Force recommended calculation based on the Chronic Kidney Disease Epidemiology Collaboration (CKD-EPI) equation refit without adjustment for race.       Narrative:      GFR Normal >60  Chronic Kidney Disease <60  Kidney Failure <15    The GFR formula is only valid for adults with stable renal function between ages 18 and 70.    CBC & Differential [[id number]]  (Abnormal) Collected: 11/10/22 0356    Specimen: Blood Updated: 11/10/22 0452    Narrative:      The following orders were created for panel order CBC & Differential.  Procedure                               Abnormality         Status                     ---------                               -----------         ------                     CBC Auto Differential[[id number]]        Abnormal            Final result                 Please view results for these tests on the individual orders.    CBC Auto Differential [[id number]]  (Abnormal) Collected: 11/10/22 0356    Specimen: Blood Updated: 11/10/22 0452     WBC 7.52 10*3/mm3      RBC 3.12 10*6/mm3      Hemoglobin 9.7 g/dL      Hematocrit 31.4 %      .6 fL      MCH 31.1 pg      MCHC 30.9 g/dL      RDW 23.3 %      RDW-SD 83.9 fl      MPV 10.7 fL      Platelets 260 10*3/mm3      Neutrophil % 57.3 %      Lymphocyte % 26.2 %      Monocyte % 11.8 %      Eosinophil % 4.0 %      Basophil % 0.3 %      Immature Grans % 0.4 %      Neutrophils, Absolute 4.31 10*3/mm3      Lymphocytes, Absolute 1.97 10*3/mm3      Monocytes, Absolute 0.89 10*3/mm3       Eosinophils, Absolute 0.30 10*3/mm3      Basophils, Absolute 0.02 10*3/mm3      Immature Grans, Absolute 0.03 10*3/mm3     CBC & Differential [[id number]]  (Abnormal) Collected: 11/09/22 0638    Specimen: Blood Updated: 11/09/22 0806    Narrative:      The following orders were created for panel order CBC & Differential.  Procedure                               Abnormality         Status                     ---------                               -----------         ------                     CBC Auto Differential[[id number]]        Abnormal            Final result               Scan Slide[[id number]]                                       Final result                 Please view results for these tests on the individual orders.    Scan Slide [[id number]] Collected: 11/09/22 0638    Specimen: Blood Updated: 11/09/22 0806     Anisocytosis Slight/1+     Macrocytes Slight/1+     WBC Morphology Normal     Platelet Morphology Normal    Basic Metabolic Panel [[id number]]  (Abnormal) Collected: 11/09/22 0638    Specimen: Blood Updated: 11/09/22 0712     Glucose 90 mg/dL      BUN 34 mg/dL      Creatinine 1.85 mg/dL      Sodium 141 mmol/L      Potassium 4.0 mmol/L      Chloride 105 mmol/L      CO2 25.8 mmol/L      Calcium 8.4 mg/dL      BUN/Creatinine Ratio 18.4     Anion Gap 10.2 mmol/L      eGFR 25.3 mL/min/1.73      Comment: National Kidney Foundation and American Society of Nephrology (ASN) Task Force recommended calculation based on the Chronic Kidney Disease Epidemiology Collaboration (CKD-EPI) equation refit without adjustment for race.       Narrative:      GFR Normal >60  Chronic Kidney Disease <60  Kidney Failure <15    The GFR formula is only valid for adults with stable renal function between ages 18 and 70.    CBC Auto Differential [[id number]]  (Abnormal) Collected: 11/09/22 0638    Specimen: Blood Updated: 11/09/22 0651     WBC 6.92 10*3/mm3      RBC 2.37 10*6/mm3      Hemoglobin 7.7 g/dL      Hematocrit 25.5 %       .6 fL      MCH 32.5 pg      MCHC 30.2 g/dL      RDW 21.4 %      RDW-SD 82.5 fl      MPV 10.8 fL      Platelets 239 10*3/mm3      Neutrophil % 56.0 %      Lymphocyte % 30.3 %      Monocyte % 10.4 %      Eosinophil % 2.9 %      Basophil % 0.3 %      Immature Grans % 0.1 %      Neutrophils, Absolute 3.87 10*3/mm3      Lymphocytes, Absolute 2.10 10*3/mm3      Monocytes, Absolute 0.72 10*3/mm3      Eosinophils, Absolute 0.20 10*3/mm3      Basophils, Absolute 0.02 10*3/mm3      Immature Grans, Absolute 0.01 10*3/mm3      nRBC 0.0 /100 WBC     Hemoglobin & Hematocrit, Blood [[id number]]  (Abnormal) Collected: 11/08/22 2212    Specimen: Blood Updated: 11/08/22 2217     Hemoglobin 8.2 g/dL      Hematocrit 27.1 %     Reticulocytes [[id number]]  (Abnormal) Collected: 11/08/22 1040    Specimen: Blood Updated: 11/08/22 1744     Reticulocyte % 13.53 %      Reticulocyte Absolute 0.2733 10*6/mm3     TSH [[id number]]  (Abnormal) Collected: 11/08/22 1040    Specimen: Blood Updated: 11/08/22 1609     TSH 5.510 uIU/mL     Iron Profile [[id number]]  (Abnormal) Collected: 11/08/22 1040    Specimen: Blood Updated: 11/08/22 1420     Iron 30 mcg/dL      Iron Saturation 9 %      TIBC 344 mcg/dL      UIBC 314 mcg/dL     Ferritin [[id number]]  (Normal) Collected: 11/08/22 1040    Specimen: Blood Updated: 11/08/22 1420     Ferritin 84.00 ng/mL     Narrative:      Results may be falsely decreased if patient taking Biotin.      Urinalysis, Microscopic Only - Urine, Clean Catch [[id number]]  (Abnormal) Collected: 11/08/22 1159    Specimen: Urine, Clean Catch Updated: 11/08/22 1325     RBC, UA None Seen /HPF      WBC, UA 3-5 /HPF      Bacteria, UA 3+ /HPF      Squamous Epithelial Cells, UA 0-2 /HPF      Hyaline Casts, UA None Seen /LPF      Methodology Manual Light Microscopy    Urinalysis With Microscopic If Indicated (No Culture) - Urine, Clean Catch [[id number]]  (Abnormal) Collected: 11/08/22 1159    Specimen: Urine, Clean Catch Updated:  11/08/22 1319     Color, UA Other     Appearance, UA Slightly Cloudy     pH, UA 6.5     Specific Gravity, UA 1.010     Glucose, UA Negative     Ketones, UA Negative     Bilirubin, UA Negative     Blood, UA Trace     Protein, UA Negative     Leuk Esterase, UA Small (1+)     Nitrite, UA Negative     Urobilinogen, UA 0.2 E.U./dL    Scan Slide [[id number]] Collected: 11/08/22 1040    Specimen: Blood Updated: 11/08/22 1208     Anisocytosis Slight/1+     Macrocytes Slight/1+     Polychromasia Slight/1+     WBC Morphology Normal     Platelet Morphology Normal    [place] Draw [[id number]] Collected: 11/08/22 1040    Specimen: Blood Updated: 11/08/22 1145    Narrative:      The following orders were created for panel order [place] Draw.  Procedure                               Abnormality         Status                     ---------                               -----------         ------                     Green Top (Gel)[[id number]]                                  Final result               Lavender Top[[id number]]                                     Final result               Gold Top - SST[[id number]]                                                              Light Blue Top[[id number]]                                   Final result                 Please view results for these tests on the individual orders.    Green Top (Gel) [[id number]] Collected: 11/08/22 1040    Specimen: Blood Updated: 11/08/22 1145     Extra Tube Hold for add-ons.     Comment: Auto resulted.       Light Blue Top [[id number]] Collected: 11/08/22 1040    Specimen: Blood Updated: 11/08/22 1145     Extra Tube Hold for add-ons.     Comment: Auto resulted       Lavender Top [[id number]] Collected: 11/08/22 1040    Specimen: Blood Updated: 11/08/22 1145     Extra Tube hold for add-on     Comment: Auto resulted       aPTT [[id number]]  (Abnormal) Collected: 11/08/22 1040    Specimen: Blood Updated: 11/08/22 1131     PTT 59.7 seconds     Narrative:      PTT =  The equivalent PTT values for the therapeutic range of heparin levels at 0.1 to 0.7 U/ml are 53 to 110 seconds.      Protime-INR [[id number]]  (Abnormal) Collected: 11/08/22 1040    Specimen: Blood Updated: 11/08/22 1131     Protime 27.7 Seconds      INR 2.52    Narrative:      Therapeutic Ranges for INR: 2.0-3.0 (PT 20-30)                              2.5-3.5 (PT 25-34)    Comprehensive Metabolic Panel [[id number]]  (Abnormal) Collected: 11/08/22 1040    Specimen: Blood Updated: 11/08/22 1113     Glucose 102 mg/dL      BUN 36 mg/dL      Creatinine 2.09 mg/dL      Sodium 142 mmol/L      Potassium 4.6 mmol/L      Chloride 105 mmol/L      CO2 28.1 mmol/L      Calcium 8.8 mg/dL      Total Protein 6.1 g/dL      Albumin 4.00 g/dL      ALT (SGPT) 7 U/L      AST (SGOT) 13 U/L      Alkaline Phosphatase 90 U/L      Total Bilirubin 0.3 mg/dL      Globulin 2.1 gm/dL      A/G Ratio 1.9 g/dL      BUN/Creatinine Ratio 17.2     Anion Gap 8.9 mmol/L      eGFR 21.9 mL/min/1.73      Comment: National Kidney Foundation and American Society of Nephrology (ASN) Task Force recommended calculation based on the Chronic Kidney Disease Epidemiology Collaboration (CKD-EPI) equation refit without adjustment for race.       Narrative:      GFR Normal >60  Chronic Kidney Disease <60  Kidney Failure <15    The GFR formula is only valid for adults with stable renal function between ages 18 and 70.        Imaging Results (Most Recent)     Procedure Component Value Units Date/Time    XR Chest 2 View [[id number]] Collected: 11/08/22 1159     Updated: 11/08/22 1204    Narrative:      CHEST X-RAY, 11/8/2022         HISTORY:  89-year-old female in the ED complaining of one to two week history of  fatigue, weakness. Low hemoglobin.     TECHNIQUE:  PA and lateral upright chest series.     COMPARISON:  *  Chest x-ray, 5/26/2022.     FINDINGS:  Mild cardiomegaly is stable. Pulmonary vascularity is normal. Mild  basilar atelectasis. No visible airspace  consolidation. Lateral image  shows likely tiny posterior pleural effusions.       Impression:      1. Stable mild cardiomegaly.  2. Suspected tiny posterior pleural effusions and mild basilar  atelectasis. Lungs otherwise clear.     This report was finalized on 11/8/2022 12:02 PM by Dr. Jose Alba MD.           PROCEDURES: none    Condition on Discharge:  stable    Physical Exam at Discharge  Vital Signs  Temp:  [97.4 °F (36.3 °C)-98 °F (36.7 °C)] 97.7 °F (36.5 °C)  Heart Rate:  [64-73] 70  Resp:  [16-18] 16  BP: (108-137)/(55-71) 127/71  Body mass index is 31.52 kg/m².    Physical Exam  Vitals reviewed.   Constitutional:       General: She is not in acute distress.     Appearance: She is obese. She is not ill-appearing.   HENT:      Head: Normocephalic and atraumatic.      Mouth/Throat:      Mouth: Mucous membranes are moist.   Eyes:      Extraocular Movements: Extraocular movements intact.      Pupils: Pupils are equal, round, and reactive to light.   Cardiovascular:      Rate and Rhythm: Normal rate and regular rhythm.   Pulmonary:      Effort: Pulmonary effort is normal. No respiratory distress.      Breath sounds: Normal breath sounds. No wheezing or rales.   Abdominal:      General: Abdomen is flat. Bowel sounds are normal. There is no distension.      Palpations: Abdomen is soft.      Tenderness: There is no abdominal tenderness. There is no guarding.   Skin:     General: Skin is warm and dry.      Capillary Refill: Capillary refill takes less than 2 seconds.      Findings: No erythema.      Comments: Right knee dressing clean and dry   Neurological:      General: No focal deficit present.      Mental Status: She is alert and oriented to person, place, and time.   Psychiatric:         Mood and Affect: Mood normal.         Behavior: Behavior normal.       Discharge Disposition  Home     Visiting Nurse:    Yes     Home PT/OT:  Yes     Home Safety Evaluation:  Yes     DME  None new    Discharge Diet:     "  Dietary Orders (From admission, onward)     Start     Ordered    11/08/22 1738  Diet Regular; Cardiac  Diet Effective Now        Question Answer Comment   Diet Texture / Consistency Regular    Common Modifiers Cardiac        11/08/22 1737                Activity at Discharge:  As tolerated    Pre-discharge education  Medications, wound care, follow up    Follow-up Appointments  Future Appointments   Date Time Provider Department Center   11/16/2022  9:30 AM ROOM [address] [facility] WOUND CARE [facility]     Additional Instructions for the Follow-ups that You Need to Schedule     Discharge Follow-up with PCP   As directed       Currently Documented PCP:    Jimena Aj MD    PCP Phone Number:    [phone]     Follow Up Details: 1 week         Discharge Follow-up with Specialty: Dr. Rodriguez; 2 Weeks   As directed      Specialty: Dr. Rodriguez    Follow Up: 2 Weeks        Additional information on Labs and Follow-ups:    Dr. Rodriguez's Office will call patient with follow up appointment information             [phone]           Test Results Pending at Discharge: to be f/u by pcp  Pending Labs     Order Current Status    [place] Draw In process           Heidi Call, APRN  11/10/22  08:55 EST    Time: Discharge over 30 min (if over 30 minutes give explanation as to why it took greater than 30 minutes)  Secondary to:   Coordination of care/follow up  Medication reconciliation  D/W patient and family    \"Dictated utilizing Dragon dictation\"      "

## 2022-11-11 ENCOUNTER — READMISSION MANAGEMENT (OUTPATIENT)
Dept: CALL CENTER | Facility: HOSPITAL | Age: 87
End: 2022-11-11

## 2022-11-11 NOTE — CASE MANAGEMENT/SOCIAL WORK
Case Management Discharge Note      Final Note: Discharged home with [facility]         Selected Continued Care - Discharged on 11/10/2022 Admission date: 11/8/2022 - Discharge disposition: Home-Health Care c    Destination    No services have been selected for the patient.              Durable Medical Equipment    No services have been selected for the patient.              Dialysis/Infusion    No services have been selected for the patient.              Home Medical Care     Service Provider Selected Services Address Phone Fax Patient Preferred    [facility] [address] [place] [id number] [phone] --          Therapy    No services have been selected for the patient.              Community Resources    No services have been selected for the patient.              Community & DME    No services have been selected for the patient.                       Final Discharge Disposition Code: 06 - home with home health care

## 2022-11-11 NOTE — OUTREACH NOTE
Prep Survey    Flowsheet Row Responses   Worship facility patient discharged from? [facility]   Is LACE score < 7 ? Yes   Emergency Room discharge w/ pulse ox? No   Eligibility Readm Mgmt   Discharge diagnosis Acute GIB on xarelto   Does the patient have one of the following disease processes/diagnoses(primary or secondary)? Other   Does the patient have Home health ordered? Yes   What is the Home health agency?  [facility]    Is there a DME ordered? No   General alerts for this patient [facility]   Prep survey completed? Yes          DAVID GIL - Registered Nurse

## 2022-11-16 ENCOUNTER — APPOINTMENT (OUTPATIENT)
Dept: WOUND CARE | Facility: HOSPITAL | Age: 87
End: 2022-11-16

## 2022-11-16 ENCOUNTER — READMISSION MANAGEMENT (OUTPATIENT)
Dept: CALL CENTER | Facility: HOSPITAL | Age: 87
End: 2022-11-16

## 2022-11-16 PROCEDURE — 97607 NEG PRS WND THR NDME<=50SQCM: CPT

## 2022-11-16 NOTE — OUTREACH NOTE
LAG < 7 Survey    Flowsheet Row Responses   [facility] facility patient discharged from? [facility]   Does the patient have one of the following disease processes/diagnoses(primary or secondary)? Other   BHLAG <7 Attempt successful? No   Unsuccessful attempts Attempt 1          MAURICE SHAH - Registered Nurse

## 2022-11-21 ENCOUNTER — READMISSION MANAGEMENT (OUTPATIENT)
Dept: CALL CENTER | Facility: HOSPITAL | Age: 87
End: 2022-11-21

## 2022-11-21 NOTE — OUTREACH NOTE
LAG < 7 Survey    Flowsheet Row Responses   [facility], operated by [facility] patient discharged from? [place]   Does the patient have one of the following disease processes/diagnoses(primary or secondary)? Other   BHLAG <7 Attempt successful? Yes   Call start time 1755   Call end time 1757   General alerts for this patient [facility]   Discharge diagnosis Acute GIB on xarelto   Is patient permission given to speak with other caregiver? Yes   List who call center can speak with POA/Daughter- Ina   Person spoke with today (if not patient) and relationship Ina   Meds reviewed with patient/caregiver? Yes   Is the patient having any side effects they believe may be caused by any medication additions or changes? No   Does the patient have all medications ordered at discharge? Yes   Is the patient taking all medications as directed (includes completed medication regime)? Yes   Does the patient have a primary care provider?  Yes   Does the patient have an appointment with their PCP within 7 days of discharge? Yes   Comments regarding PCP has a f/u with PCP next week   Has the patient kept scheduled appointments due by today? N/A   What is the Home health agency?  [facility]    Has home health visited the patient within 72 hours of discharge? Yes   Psychosocial issues? No   Did the patient receive a copy of their discharge instructions? Yes   What is the patient's perception of their health status since discharge? Improving   Is the patient/caregiver able to teach back the hierarchy of who to call/visit for symptoms/problems? PCP, Specialist, Home health nurse, Urgent Care, ED, 911 Yes   Graduated Yes   Is the patient interested in additional calls from an ambulatory ?  NOTE:  applies to high risk patients requiring additional follow-up. No   Wrap up additional comments Per daughter, patient is doing well, no questions.          [facility] - Registered Nurse

## 2022-11-23 ENCOUNTER — APPOINTMENT (OUTPATIENT)
Dept: WOUND CARE | Facility: HOSPITAL | Age: 87
End: 2022-11-23

## 2022-11-23 PROCEDURE — G0463 HOSPITAL OUTPT CLINIC VISIT: HCPCS

## 2022-11-29 ENCOUNTER — OFFICE VISIT (OUTPATIENT)
Dept: GASTROENTEROLOGY | Facility: CLINIC | Age: 87
End: 2022-11-29

## 2022-11-29 ENCOUNTER — LAB (OUTPATIENT)
Dept: LAB | Facility: HOSPITAL | Age: 87
End: 2022-11-29

## 2022-11-29 VITALS
SYSTOLIC BLOOD PRESSURE: 118 MMHG | BODY MASS INDEX: 31.87 KG/M2 | WEIGHT: 168.8 LBS | DIASTOLIC BLOOD PRESSURE: 64 MMHG | HEIGHT: 61 IN

## 2022-11-29 DIAGNOSIS — K55.20 ANGIODYSPLASIA: ICD-10-CM

## 2022-11-29 DIAGNOSIS — K21.00 GASTROESOPHAGEAL REFLUX DISEASE WITH ESOPHAGITIS WITHOUT HEMORRHAGE: ICD-10-CM

## 2022-11-29 DIAGNOSIS — D62 ANEMIA DUE TO BLOOD LOSS, ACUTE: Primary | ICD-10-CM

## 2022-11-29 DIAGNOSIS — N18.9 CHRONIC KIDNEY DISEASE, UNSPECIFIED CKD STAGE: ICD-10-CM

## 2022-11-29 LAB
BASOPHILS # BLD AUTO: 0.06 10*3/MM3 (ref 0–0.2)
BASOPHILS NFR BLD AUTO: 0.7 % (ref 0–1.5)
DEPRECATED RDW RBC AUTO: 54.3 FL (ref 37–54)
EOSINOPHIL # BLD AUTO: 0.23 10*3/MM3 (ref 0–0.4)
EOSINOPHIL NFR BLD AUTO: 2.8 % (ref 0.3–6.2)
ERYTHROCYTE [DISTWIDTH] IN BLOOD BY AUTOMATED COUNT: 14.8 % (ref 12.3–15.4)
FERRITIN SERPL-MCNC: 180 NG/ML (ref 13–150)
HCT VFR BLD AUTO: 39.6 % (ref 34–46.6)
HGB BLD-MCNC: 12.5 G/DL (ref 12–15.9)
IMM GRANULOCYTES # BLD AUTO: 0.01 10*3/MM3 (ref 0–0.05)
IMM GRANULOCYTES NFR BLD AUTO: 0.1 % (ref 0–0.5)
IRON 24H UR-MRATE: 108 MCG/DL (ref 37–145)
IRON SATN MFR SERPL: 31 % (ref 20–50)
LYMPHOCYTES # BLD AUTO: 2.85 10*3/MM3 (ref 0.7–3.1)
LYMPHOCYTES NFR BLD AUTO: 34.1 % (ref 19.6–45.3)
MCH RBC QN AUTO: 31.2 PG (ref 26.6–33)
MCHC RBC AUTO-ENTMCNC: 31.6 G/DL (ref 31.5–35.7)
MCV RBC AUTO: 98.8 FL (ref 79–97)
MONOCYTES # BLD AUTO: 0.95 10*3/MM3 (ref 0.1–0.9)
MONOCYTES NFR BLD AUTO: 11.4 % (ref 5–12)
NEUTROPHILS NFR BLD AUTO: 4.25 10*3/MM3 (ref 1.7–7)
NEUTROPHILS NFR BLD AUTO: 50.9 % (ref 42.7–76)
NRBC BLD AUTO-RTO: 0 /100 WBC (ref 0–0.2)
PLATELET # BLD AUTO: 261 10*3/MM3 (ref 140–450)
PMV BLD AUTO: 12 FL (ref 6–12)
RBC # BLD AUTO: 4.01 10*6/MM3 (ref 3.77–5.28)
TIBC SERPL-MCNC: 349 MCG/DL (ref 298–536)
TRANSFERRIN SERPL-MCNC: 234 MG/DL (ref 200–360)
WBC NRBC COR # BLD: 8.35 10*3/MM3 (ref 3.4–10.8)

## 2022-11-29 PROCEDURE — 36415 COLL VENOUS BLD VENIPUNCTURE: CPT

## 2022-11-29 PROCEDURE — 83540 ASSAY OF IRON: CPT

## 2022-11-29 PROCEDURE — 82728 ASSAY OF FERRITIN: CPT

## 2022-11-29 PROCEDURE — 85025 COMPLETE CBC W/AUTO DIFF WBC: CPT

## 2022-11-29 PROCEDURE — 84466 ASSAY OF TRANSFERRIN: CPT

## 2022-11-29 PROCEDURE — 99214 OFFICE O/P EST MOD 30 MIN: CPT

## 2022-11-29 RX ORDER — FUROSEMIDE 40 MG/1
TABLET ORAL
COMMUNITY
Start: 2022-10-26

## 2022-11-29 NOTE — PROGRESS NOTES
PATIENT INFORMATION  Aria Fernando       - 1930    CHIEF COMPLAINT  Chief Complaint   Patient presents with   • Anemia   • GI Bleeding       HISTORY OF PRESENT ILLNESS  ER visit 22 secondary to 2 weeks of generalized weakness and SOA with exertion. No bloody stools. R knee wound vac, was on xarelto, but never bleeding. GIB in past on xarelto. Positive hemoccult stool, hgb 6.7, given 1 unit PRBCs. Increase in BUN, but not significant compared to past. CKD. Dark stools with iron, but does not recall any melena or hematochezia episodes and no difficult clean ups. Only noticed because of fatigue. Does feel better now.    EGD and colon 3/3/18; Nonbleeding hemorrhoids, 2 angiodysplasias in colon treated with APC, 1 adenoma; Ulcerative gastritis, no HP, irregular Z line.    No odynophagia, dysphagia, nausea, vomiting, abdominal pain, bloating, belching. Rare breakthrough symptoms, sometimes feels like food sits at distal esophagus. No NSAIDs or OTC antacids.      REVIEWED PERTINENT RESULTS/ LABS  Lab Results   Component Value Date    CASEREPORT  2018     Surgical Pathology Report                         Case: [id number]                                  Authorizing Provider:  Terrie Carroll MD         Collected:           2018 09:50 AM          Ordering Location:     [facility]  Received:            2018 06:45 AM                                 ENDO SUITES                                                                  Pathologist:           Sanford Whitman MD                                                          Specimens:   1) - Small Intestine                                                                                2) - Gastric, Antrum, antrum and body                                                               3) - Gastric, Fundus                                                                                4) - Large Intestine, Sigmoid Colon                                                                  5) - Large Intestine, Rectum                                                               FINALDX  03/03/2018     1.  SMALL INTESTINE, DUODENUM, BIOPSY:    FRAGMENTS OF DUODENAL MUCOSA WITH PRESERVED VILLOUS ARCHITECTURE.    MINIMALLY INCREASED NUMBERS OF MONONUCLEAR CELLS IN THE LAMINA PROPRIA, NONSPECIFIC.    NO HISTOLOGIC EVIDENCE OF CELIAC SPRUE.    2.  STOMACH, ANTRUM AND BODY, BIOPSY:    FRAGMENTS OF GASTRIC MUCOSA WITH FOCAL EROSION / ULCERATION WITH MILD ASSOCIATED          ACUTE INFLAMMATION.    NON-ULCERATED GASTRIC MUCOSA WITH MILD CHRONIC GASTRITIS.   FOCAL REACTIVE EPITHELIAL CHANGES.    NO DYSPLASIA IS IDENTIFIED.    NO HELICOBACTER PYLORI ORGANISMS SEEN ON SPECIAL STAIN (DIFF-QUIK).     3.  STOMACH, FUNDUS, BIOPSY:    FRAGMENTS OF GASTRIC MUCOSA WITH MILD FOVEOLAR HYPERPLASIA AND MILD CHRONIC          GASTRITIS.     NO HELICOBACTER PYLORI ORGANISMS SEEN ON SPECIAL STAIN (DIFF-QUIK).       4.  COLON, SIGMOID, BIOPSY:    TUBULAR ADENOMA.   ONLY LOW-GRADE DYSPLASIA IS IDENTIFIED.      5.  RECTUM, BIOPSY:    BENIGN RECTAL MUCOSA WITH  MINIMAL HYPERPLASTIC FEATURES.   NO EVIDENCE OF ADENOMATOUS CHANGE.     TDJ/brb     CPT CODES:  1.  48075   2.  08883, 10339  3.  23031, 01014  4.  93424  5.  27054       Lab Results   Component Value Date    HGB 9.7 (L) 11/10/2022    .6 (H) 11/10/2022     11/10/2022    ALT 7 11/08/2022    AST 13 11/08/2022    INR 2.52 (H) 11/08/2022    TRIG 126 11/07/2022    FERRITIN 84.00 11/08/2022    IRON 30 (L) 11/08/2022    TIBC 344 11/08/2022      XR Chest 2 View    Result Date: 11/8/2022  Narrative: CHEST X-RAY, 11/8/2022     HISTORY: 89-year-old female in the ED complaining of one to two week history of fatigue, weakness. Low hemoglobin.  TECHNIQUE: PA and lateral upright chest series.  COMPARISON: *  Chest x-ray, 5/26/2022.  FINDINGS: Mild cardiomegaly is stable. Pulmonary vascularity is normal. Mild basilar  atelectasis. No visible airspace consolidation. Lateral image shows likely tiny posterior pleural effusions.      Impression: 1. Stable mild cardiomegaly. 2. Suspected tiny posterior pleural effusions and mild basilar atelectasis. Lungs otherwise clear.  This report was finalized on 11/8/2022 12:02 PM by Dr. Jose Alba MD.        REVIEW OF SYSTEMS  Review of Systems   HENT: Negative.    Eyes: Negative.    Respiratory: Negative.    Cardiovascular: Negative.    Gastrointestinal: Negative.    Endocrine: Negative.    Genitourinary: Negative.    Musculoskeletal: Positive for arthralgias, gait problem, joint swelling and myalgias.   Skin: Negative.    Allergic/Immunologic: Negative.    Neurological: Positive for weakness.   Hematological: Bruises/bleeds easily.   Psychiatric/Behavioral: Negative.          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) [D68.32]    • Acute on chronic diastolic congestive heart failure (HCC) [I50.33]    • Anemia due to blood loss [D50.0]    • Lower GI bleed [K92.2]    • Atrial fibrillation (HCC) [I48.91]    • Anemia due to blood loss, acute [D62]    • Essential hypertension, benign [I10]    • 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]          PAST MEDICAL HISTORY  Past Medical History:   Diagnosis Date   • A-fib (HCC)    • Anticoagulant-induced bleeding (HCC)    • Arthritis    • CHF (congestive heart failure) (HCC)    • Coronary artery disease    • DVT (deep venous thrombosis) (HCC)    • GERD (gastroesophageal reflux disease)    • History of transfusion    • Hyperlipidemia    •  Hypertension    • 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)    • On anticoagulant therapy    • Osteoarthritis    • Polycythemia    • PVD (peripheral vascular disease) (HCC)          SURGICAL HISTORY  Past Surgical History:   Procedure Laterality Date   • APPENDECTOMY     • CAROTID ENDARTERECTOMY Left    • COLONOSCOPY N/A 03/03/2018    Procedure:  Colonoscopy to Terminal ileum with APC treatment for 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 and cold biopsy;  Surgeon: Terrie Carroll MD;  Location: [facility] ENDOSCOPY;  Service:    • ENDOSCOPY N/A 03/03/2018    Procedure: EGD with biopsy;  Surgeon: Terrie Carroll MD;  Location: [facility] ENDOSCOPY;  Service:    • KNEE ARTHROSCOPY Right          FAMILY HISTORY  History reviewed. No pertinent family history.      SOCIAL HISTORY  Social History     Occupational History   • Not on file   Tobacco Use   • Smoking status: Never   • Smokeless tobacco: Never   Vaping Use   • Vaping Use: Never used   Substance and Sexual Activity   • Alcohol use: No   • Drug use: No   • Sexual activity: Never         CURRENT MEDICATIONS    Current Outpatient Medications:   •  acetaminophen (TYLENOL) 325 MG tablet, Take 2 tablets by mouth Every 4 (Four) Hours As Needed for Mild Pain ., Disp: , Rfl:   •  amiodarone (PACERONE) 200 MG tablet, Take 200 mg by mouth Daily., Disp: , Rfl:   •  Calcium Carb-Cholecalciferol (OYSTER SHELL CALCIUM/VITAMIN D) 250-125 MG-UNIT tablet tablet, Take 2 tablets by mouth 2 (Two) Times a Day., Disp: , Rfl:   •  cholecalciferol (VITAMIN D3) 400 units tablet, Take 400 Units by mouth 2 (Two) Times a Day., Disp: , Rfl:   •  docusate sodium (COLACE) 100 MG capsule, Take 1 capsule by mouth 2 (Two) Times a Day As Needed., Disp: , Rfl:   •  ferrous sulfate 325 (65 FE) MG tablet, Take 325 mg by mouth Daily With Breakfast., Disp: , Rfl:   •  furosemide (LASIX) 40 MG tablet, , Disp: , Rfl:   •  gabapentin (NEURONTIN) 100 MG capsule, Take 100 mg by mouth 2 (Two) Times a Day., Disp: , Rfl:   •   "miconazole (MICOTIN) 2 % powder, Apply  topically to the appropriate area as directed Every 12 (Twelve) Hours., Disp: 85 g, Rfl: 0  •  mirtazapine (REMERON) 15 MG tablet, Take 15 mg by mouth Every Night., Disp: , Rfl:   •  pantoprazole (Protonix) 40 MG EC tablet, Take 1 tablet by mouth Daily., Disp: 30 tablet, Rfl: 1  •  rosuvastatin (CRESTOR) 5 MG tablet, Take 5 mg by mouth Daily., Disp: , Rfl:     ALLERGIES  Clinoril [sulindac], Codeine, Coumadin [warfarin sodium], Ibuprofen-oxycodone [oxycodone-ibuprofen], Keflex [cephalexin], Mydriacyl [tropicamide], Don-synephrine [phenylephrine], Penicillins, Phenylephrine hcl, Sulfa antibiotics, and Zantac [ranitidine hcl]    VITALS  Vitals:    11/29/22 0946   BP: 118/64   BP Location: Left arm   Patient Position: Sitting   Cuff Size: Adult   Weight: 76.6 kg (168 lb 12.8 oz)   Height: 154.9 cm (60.98\")       PHYSICAL EXAM  Debilities/Disabilities Identified: None  Emotional Behavior: Appropriate  Wt Readings from Last 3 Encounters:   11/29/22 76.6 kg (168 lb 12.8 oz)   11/08/22 75.7 kg (166 lb 12.8 oz)   08/30/22 79.4 kg (175 lb)     Ht Readings from Last 1 Encounters:   11/29/22 154.9 cm (60.98\")     Body mass index is 31.91 kg/m².  Physical Exam  Constitutional:       General: She is not in acute distress.     Appearance: Normal appearance. She is not ill-appearing.   HENT:      Head: Normocephalic and atraumatic.      Mouth/Throat:      Mouth: Mucous membranes are moist.      Pharynx: No posterior oropharyngeal erythema.   Eyes:      General: No scleral icterus.  Cardiovascular:      Rate and Rhythm: Normal rate and regular rhythm.      Heart sounds: Normal heart sounds.   Pulmonary:      Effort: Pulmonary effort is normal.      Breath sounds: Normal breath sounds.   Abdominal:      General: Abdomen is flat. Bowel sounds are normal. There is no distension.      Palpations: Abdomen is soft. There is no mass.      Tenderness: There is no abdominal tenderness. There is no " guarding or rebound. Negative signs include Hernandez's sign.      Hernia: No hernia is present.   Musculoskeletal:      Cervical back: Neck supple.   Skin:     General: Skin is warm.      Capillary Refill: Capillary refill takes less than 2 seconds.   Neurological:      General: No focal deficit present.      Mental Status: She is alert and oriented to person, place, and time.   Psychiatric:         Mood and Affect: Mood normal.         Behavior: Behavior normal.         Thought Content: Thought content normal.         Judgment: Judgment normal.         CLINICAL DATA REVIEWED   reviewed previous lab results and integrated with today's visit, reviewed notes from other physicians and/or last GI encounter, reviewed previous endoscopy results and available photos, reviewed surgical pathology results from previous biopsies    ASSESSMENT  Diagnoses and all orders for this visit:    Anemia due to blood loss, acute  -     CBC & Differential  -     Iron Profile  -     Ferritin    Gastroesophageal reflux disease with esophagitis without hemorrhage    Angiodysplasia    Chronic kidney disease, unspecified CKD stage    Other orders  -     furosemide (LASIX) 40 MG tablet          PLAN  Check CBC, iron and ferritin   likely need repeat scopes    Return in about 3 months (around 2/28/2023).    I have discussed the above plan with the patient.  They verbalize understanding and are in agreement with the plan.  They have been advised to contact the office for any questions, concerns, or changes related to their health.

## 2022-11-30 ENCOUNTER — APPOINTMENT (OUTPATIENT)
Dept: WOUND CARE | Facility: HOSPITAL | Age: 87
End: 2022-11-30

## 2022-12-01 ENCOUNTER — TELEPHONE (OUTPATIENT)
Dept: GASTROENTEROLOGY | Facility: CLINIC | Age: 87
End: 2022-12-01

## 2022-12-01 ENCOUNTER — PATIENT ROUNDING (BHMG ONLY) (OUTPATIENT)
Dept: GASTROENTEROLOGY | Facility: CLINIC | Age: 87
End: 2022-12-01

## 2022-12-01 DIAGNOSIS — D62 ANEMIA DUE TO BLOOD LOSS, ACUTE: Primary | ICD-10-CM

## 2022-12-01 NOTE — TELEPHONE ENCOUNTER
Called Daughter gave results    Will have HH nurse call to give fax number ( need to fax orders for re-draw in 2 weeks Iron/CBC) and make sure they fax results back to us.

## 2022-12-01 NOTE — PROGRESS NOTES
December 1, 2022    Hello, may I speak with Aria Fernando?    My name is Mishel Aaron      I am  with [facility]  [address]  [place] [address] [phone].    Before we get started may I verify your date of birth? 9/27/1930    I am calling to officially welcome you to our practice and ask about your recent visit. Is this a good time to talk? no    LVM for pt to call back with questions or concerns

## 2022-12-07 ENCOUNTER — APPOINTMENT (OUTPATIENT)
Dept: WOUND CARE | Facility: HOSPITAL | Age: 87
End: 2022-12-07

## 2022-12-07 PROCEDURE — G0463 HOSPITAL OUTPT CLINIC VISIT: HCPCS

## 2023-03-01 ENCOUNTER — LAB (OUTPATIENT)
Dept: LAB | Facility: HOSPITAL | Age: 88
End: 2023-03-01
Payer: MEDICARE

## 2023-03-01 ENCOUNTER — OFFICE VISIT (OUTPATIENT)
Dept: GASTROENTEROLOGY | Facility: CLINIC | Age: 88
End: 2023-03-01
Payer: MEDICARE

## 2023-03-01 VITALS
SYSTOLIC BLOOD PRESSURE: 134 MMHG | DIASTOLIC BLOOD PRESSURE: 74 MMHG | HEIGHT: 61 IN | WEIGHT: 182.2 LBS | BODY MASS INDEX: 34.4 KG/M2

## 2023-03-01 DIAGNOSIS — D62 ANEMIA DUE TO BLOOD LOSS, ACUTE: Primary | ICD-10-CM

## 2023-03-01 DIAGNOSIS — K21.00 GASTROESOPHAGEAL REFLUX DISEASE WITH ESOPHAGITIS WITHOUT HEMORRHAGE: ICD-10-CM

## 2023-03-01 LAB
ANION GAP SERPL CALCULATED.3IONS-SCNC: 12.1 MMOL/L (ref 5–15)
BASOPHILS # BLD AUTO: 0.04 10*3/MM3 (ref 0–0.2)
BASOPHILS NFR BLD AUTO: 0.4 % (ref 0–1.5)
BUN SERPL-MCNC: 28 MG/DL (ref 8–23)
BUN/CREAT SERPL: 18.3 (ref 7–25)
CALCIUM SPEC-SCNC: 10 MG/DL (ref 8.2–9.6)
CHLORIDE SERPL-SCNC: 102 MMOL/L (ref 98–107)
CO2 SERPL-SCNC: 26.9 MMOL/L (ref 22–29)
CREAT SERPL-MCNC: 1.53 MG/DL (ref 0.57–1)
DEPRECATED RDW RBC AUTO: 48.1 FL (ref 37–54)
EGFRCR SERPLBLD CKD-EPI 2021: 31.8 ML/MIN/1.73
EOSINOPHIL # BLD AUTO: 0.22 10*3/MM3 (ref 0–0.4)
EOSINOPHIL NFR BLD AUTO: 2.5 % (ref 0.3–6.2)
ERYTHROCYTE [DISTWIDTH] IN BLOOD BY AUTOMATED COUNT: 13.7 % (ref 12.3–15.4)
FERRITIN SERPL-MCNC: 130 NG/ML (ref 13–150)
GLUCOSE SERPL-MCNC: 91 MG/DL (ref 65–99)
HCT VFR BLD AUTO: 44.6 % (ref 34–46.6)
HGB BLD-MCNC: 14.9 G/DL (ref 12–15.9)
IMM GRANULOCYTES # BLD AUTO: 0.03 10*3/MM3 (ref 0–0.05)
IMM GRANULOCYTES NFR BLD AUTO: 0.3 % (ref 0–0.5)
IRON 24H UR-MRATE: 108 MCG/DL (ref 37–145)
IRON SATN MFR SERPL: 30 % (ref 20–50)
LYMPHOCYTES # BLD AUTO: 2.82 10*3/MM3 (ref 0.7–3.1)
LYMPHOCYTES NFR BLD AUTO: 31.6 % (ref 19.6–45.3)
MCH RBC QN AUTO: 31.9 PG (ref 26.6–33)
MCHC RBC AUTO-ENTMCNC: 33.4 G/DL (ref 31.5–35.7)
MCV RBC AUTO: 95.5 FL (ref 79–97)
MONOCYTES # BLD AUTO: 1 10*3/MM3 (ref 0.1–0.9)
MONOCYTES NFR BLD AUTO: 11.2 % (ref 5–12)
NEUTROPHILS NFR BLD AUTO: 4.81 10*3/MM3 (ref 1.7–7)
NEUTROPHILS NFR BLD AUTO: 54 % (ref 42.7–76)
NRBC BLD AUTO-RTO: 0 /100 WBC (ref 0–0.2)
PLATELET # BLD AUTO: 265 10*3/MM3 (ref 140–450)
PMV BLD AUTO: 11.3 FL (ref 6–12)
POTASSIUM SERPL-SCNC: 4.2 MMOL/L (ref 3.5–5.2)
RBC # BLD AUTO: 4.67 10*6/MM3 (ref 3.77–5.28)
SODIUM SERPL-SCNC: 141 MMOL/L (ref 136–145)
TIBC SERPL-MCNC: 356 MCG/DL (ref 298–536)
TRANSFERRIN SERPL-MCNC: 239 MG/DL (ref 200–360)
WBC NRBC COR # BLD: 8.92 10*3/MM3 (ref 3.4–10.8)

## 2023-03-01 PROCEDURE — 82728 ASSAY OF FERRITIN: CPT

## 2023-03-01 PROCEDURE — 85025 COMPLETE CBC W/AUTO DIFF WBC: CPT

## 2023-03-01 PROCEDURE — 84466 ASSAY OF TRANSFERRIN: CPT

## 2023-03-01 PROCEDURE — 80048 BASIC METABOLIC PNL TOTAL CA: CPT

## 2023-03-01 PROCEDURE — 99213 OFFICE O/P EST LOW 20 MIN: CPT

## 2023-03-01 PROCEDURE — 83540 ASSAY OF IRON: CPT

## 2023-03-01 PROCEDURE — 36415 COLL VENOUS BLD VENIPUNCTURE: CPT

## 2023-03-01 RX ORDER — HYDROCHLOROTHIAZIDE 12.5 MG/1
TABLET ORAL
COMMUNITY
Start: 2023-02-15

## 2023-03-01 RX ORDER — PRAVASTATIN SODIUM 80 MG/1
TABLET ORAL
COMMUNITY
Start: 2023-02-15

## 2023-03-01 NOTE — PROGRESS NOTES
PATIENT INFORMATION  Aria Fernando       - 1930    CHIEF COMPLAINT  Chief Complaint   Patient presents with   • Anemia       HISTORY OF PRESENT ILLNESS  Here today for follow-up from [facility]    Per LOV: ER visit 22 secondary to 2 weeks of generalized weakness and SOA with exertion. No bloody stools. R knee wound vac, was on xarelto, but never bleeding. GIB in past on xarelto. Positive hemoccult stool, hgb 6.7, given 1 unit PRBCs. Increase in BUN, but not significant compared to past. CKD. Dark stools with iron, but does not recall any melena or hematochezia episodes and no difficult clean ups. Only noticed because of fatigue. Does feel better now. Anemia resolved on CBC that visit. Plan was to recheck 2 weeks later, but  does not seem to have obtained labs. Will recheck today. Is feeling better and no evidence bleeding other than a minor nose bleed yesterday. Is still on daily iron supplement.     She is out of rehab now, knee healed and back in assisted living ambulating with walker.     EGD and colon 3/3/18; Nonbleeding hemorrhoids, 2 angiodysplasias in colon treated with APC, 1 adenoma; Ulcerative gastritis, no HP, irregular Z line.     No odynophagia, dysphagia, nausea, vomiting, abdominal pain, bloating, belching. Rare breakthrough symptoms, sometimes feels like food sits at distal esophagus. No NSAIDs or OTC antacids.      REVIEWED PERTINENT RESULTS/ LABS  Lab Results   Component Value Date    CASEREPORT  2018     Surgical Pathology Report                         Case: [id number]                                  Authorizing Provider:  Terrie Carroll MD         Collected:           2018 09:50 AM          Ordering Location:     [facility]  Received:            2018 06:45 AM                                 ENDO SUITES                                                                  Pathologist:           Sanford Whitman MD                                                           Specimens:   1) - Small Intestine                                                                                2) - Gastric, Antrum, antrum and body                                                               3) - Gastric, Fundus                                                                                4) - Large Intestine, Sigmoid Colon                                                                 5) - Large Intestine, Rectum                                                               FINALDX  03/03/2018     1.  SMALL INTESTINE, DUODENUM, BIOPSY:    FRAGMENTS OF DUODENAL MUCOSA WITH PRESERVED VILLOUS ARCHITECTURE.    MINIMALLY INCREASED NUMBERS OF MONONUCLEAR CELLS IN THE LAMINA PROPRIA, NONSPECIFIC.    NO HISTOLOGIC EVIDENCE OF CELIAC SPRUE.    2.  STOMACH, ANTRUM AND BODY, BIOPSY:    FRAGMENTS OF GASTRIC MUCOSA WITH FOCAL EROSION / ULCERATION WITH MILD ASSOCIATED          ACUTE INFLAMMATION.    NON-ULCERATED GASTRIC MUCOSA WITH MILD CHRONIC GASTRITIS.   FOCAL REACTIVE EPITHELIAL CHANGES.    NO DYSPLASIA IS IDENTIFIED.    NO HELICOBACTER PYLORI ORGANISMS SEEN ON SPECIAL STAIN (DIFF-QUIK).     3.  STOMACH, FUNDUS, BIOPSY:    FRAGMENTS OF GASTRIC MUCOSA WITH MILD FOVEOLAR HYPERPLASIA AND MILD CHRONIC          GASTRITIS.     NO HELICOBACTER PYLORI ORGANISMS SEEN ON SPECIAL STAIN (DIFF-QUIK).       4.  COLON, SIGMOID, BIOPSY:    TUBULAR ADENOMA.   ONLY LOW-GRADE DYSPLASIA IS IDENTIFIED.      5.  RECTUM, BIOPSY:    BENIGN RECTAL MUCOSA WITH  MINIMAL HYPERPLASTIC FEATURES.   NO EVIDENCE OF ADENOMATOUS CHANGE.     TDJ/brb     CPT CODES:  1.  88606   2.  01178, 59896  3.  02876, 20246  4.  31231  5.  84531       Lab Results   Component Value Date    HGB 12.5 11/29/2022    MCV 98.8 (H) 11/29/2022     11/29/2022    ALT 7 11/08/2022    AST 13 11/08/2022    INR 2.52 (H) 11/08/2022    TRIG 126 11/07/2022    FERRITIN 180.00 (H) 11/29/2022    IRON 108 11/29/2022    TIBC 349 11/29/2022      No  results found.    REVIEW OF SYSTEMS  Review of Systems   Constitutional: Negative.    HENT: Negative.    Eyes: Negative.    Respiratory: Positive for cough.    Cardiovascular: Positive for leg swelling.   Gastrointestinal: Negative for abdominal distention, abdominal pain, constipation, diarrhea, nausea and vomiting.        GERD   Endocrine: Negative.    Genitourinary: Negative.    Musculoskeletal: Negative.    Skin: Negative.    Allergic/Immunologic: Negative.    Neurological: Positive for light-headedness.   Hematological: Bruises/bleeds easily.   Psychiatric/Behavioral: The patient is nervous/anxious.          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) [D68.32]    • Acute on chronic diastolic congestive heart failure (HCC) [I50.33]    • Anemia due to blood loss [D50.0]    • Lower GI bleed [K92.2]    • Atrial fibrillation (HCC) [I48.91]    • Anemia due to blood loss, acute [D62]    • Essential hypertension, benign [I10]    • 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]          PAST MEDICAL HISTORY  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)    • On anticoagulant therapy    • Osteoarthritis    • Polycythemia    • PVD (peripheral vascular disease) (HCC)          SURGICAL  HISTORY  Past Surgical History:   Procedure Laterality Date   • APPENDECTOMY     • CAROTID ENDARTERECTOMY Left    • COLONOSCOPY N/A 03/03/2018    Procedure:  Colonoscopy to Terminal ileum with APC treatment for 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 and cold biopsy;  Surgeon: Terrie Carroll MD;  Location: [facility] ENDOSCOPY;  Service:    • ENDOSCOPY N/A 03/03/2018    Procedure: EGD with biopsy;  Surgeon: Terrie Carroll MD;  Location: [facility] ENDOSCOPY;  Service:    • KNEE ARTHROSCOPY Right          FAMILY HISTORY  History reviewed. No pertinent family history.      SOCIAL HISTORY  Social History     Occupational History   • Not on file   Tobacco Use   • Smoking status: Never   • Smokeless tobacco: Never   Vaping Use   • Vaping Use: Never used   Substance and Sexual Activity   • Alcohol use: No   • Drug use: No   • Sexual activity: Never         CURRENT MEDICATIONS    Current Outpatient Medications:   •  acetaminophen (TYLENOL) 325 MG tablet, Take 2 tablets by mouth Every 4 (Four) Hours As Needed for Mild Pain ., Disp: , Rfl:   •  amiodarone (PACERONE) 200 MG tablet, Take 1 tablet by mouth Daily., Disp: , Rfl:   •  Calcium Carb-Cholecalciferol (OYSTER SHELL CALCIUM/VITAMIN D) 250-125 MG-UNIT tablet tablet, Take 2 tablets by mouth 2 (Two) Times a Day., Disp: , Rfl:   •  cholecalciferol (VITAMIN D3) 400 units tablet, Take 1 tablet by mouth 2 (Two) Times a Day., Disp: , Rfl:   •  docusate sodium (COLACE) 100 MG capsule, Take 1 capsule by mouth 2 (Two) Times a Day As Needed., Disp: , Rfl:   •  ferrous sulfate 325 (65 FE) MG tablet, Take 1 tablet by mouth Daily With Breakfast., Disp: , Rfl:   •  furosemide (LASIX) 40 MG tablet, , Disp: , Rfl:   •  gabapentin (NEURONTIN) 100 MG capsule, Take 1 capsule by mouth 2 (Two) Times a Day., Disp: , Rfl:   •  hydroCHLOROthiazide (HYDRODIURIL) 12.5 MG tablet, , Disp: , Rfl:   •  metoprolol tartrate (LOPRESSOR) 25 MG tablet, , Disp: , Rfl:   •  miconazole (MICOTIN) 2 % powder, Apply  topically to the  "appropriate area as directed Every 12 (Twelve) Hours., Disp: 85 g, Rfl: 0  •  mirtazapine (REMERON) 15 MG tablet, Take 1 tablet by mouth Every Night., Disp: , Rfl:   •  pantoprazole (Protonix) 40 MG EC tablet, Take 1 tablet by mouth Daily., Disp: 30 tablet, Rfl: 1  •  pravastatin (PRAVACHOL) 80 MG tablet, , Disp: , Rfl:   •  rosuvastatin (CRESTOR) 5 MG tablet, Take 1 tablet by mouth Daily., Disp: , Rfl:     ALLERGIES  Clinoril [sulindac], Codeine, Coumadin [warfarin sodium], Ibuprofen-oxycodone [oxycodone-ibuprofen], Keflex [cephalexin], Mydriacyl [tropicamide], Don-synephrine [phenylephrine], Penicillins, Phenylephrine hcl, Sulfa antibiotics, and Zantac [ranitidine hcl]    VITALS  Vitals:    03/01/23 0833   BP: 134/74   BP Location: Left arm   Patient Position: Sitting   Cuff Size: Adult   Weight: 82.6 kg (182 lb 3.2 oz)   Height: 154.9 cm (60.98\")       PHYSICAL EXAM  Debilities/Disabilities Identified: None  Emotional Behavior: Appropriate  Wt Readings from Last 3 Encounters:   03/01/23 82.6 kg (182 lb 3.2 oz)   11/29/22 76.6 kg (168 lb 12.8 oz)   11/08/22 75.7 kg (166 lb 12.8 oz)     Ht Readings from Last 1 Encounters:   03/01/23 154.9 cm (60.98\")     Body mass index is 34.44 kg/m².  Physical Exam  Constitutional:       General: She is not in acute distress.     Appearance: Normal appearance. She is not ill-appearing.   HENT:      Head: Normocephalic and atraumatic.      Mouth/Throat:      Mouth: Mucous membranes are moist.      Pharynx: No posterior oropharyngeal erythema.   Eyes:      General: No scleral icterus.  Cardiovascular:      Rate and Rhythm: Normal rate and regular rhythm.      Heart sounds: Normal heart sounds.   Pulmonary:      Effort: Pulmonary effort is normal.      Breath sounds: Normal breath sounds.   Abdominal:      General: Abdomen is flat. Bowel sounds are normal. There is no distension.      Palpations: Abdomen is soft. There is no mass.      Tenderness: There is no abdominal tenderness. " There is no guarding or rebound. Negative signs include Hernandez's sign.      Hernia: No hernia is present.   Musculoskeletal:      Cervical back: Neck supple.   Skin:     General: Skin is warm.      Capillary Refill: Capillary refill takes less than 2 seconds.   Neurological:      General: No focal deficit present.      Mental Status: She is alert and oriented to person, place, and time.   Psychiatric:         Mood and Affect: Mood normal.         Behavior: Behavior normal.         Thought Content: Thought content normal.         Judgment: Judgment normal.         CLINICAL DATA REVIEWED   reviewed previous lab results and integrated with today's visit, reviewed notes from other physicians and/or last GI encounter, reviewed previous endoscopy results and available photos, reviewed surgical pathology results from previous biopsies    ASSESSMENT  Diagnoses and all orders for this visit:    Anemia due to blood loss, acute  -     Iron Profile  -     Ferritin  -     Basic Metabolic Panel  -     CBC & Differential    Gastroesophageal reflux disease with esophagitis without hemorrhage    Other orders  -     hydroCHLOROthiazide (HYDRODIURIL) 12.5 MG tablet  -     metoprolol tartrate (LOPRESSOR) 25 MG tablet  -     pravastatin (PRAVACHOL) 80 MG tablet          PLAN    Check labs today, consider holding iron and rechecking in 3-4 months    Return in about 3 months (around 6/1/2023).    I have discussed the above plan with the patient.  They verbalize understanding and are in agreement with the plan.  They have been advised to contact the office for any questions, concerns, or changes related to their health.

## 2023-06-18 PROBLEM — D50.0 ANEMIA DUE TO GI BLOOD LOSS: Status: ACTIVE | Noted: 2023-06-18

## 2023-06-19 ENCOUNTER — ANESTHESIA (OUTPATIENT)
Dept: PERIOP | Facility: HOSPITAL | Age: 88
End: 2023-06-19
Payer: MEDICARE

## 2023-06-19 ENCOUNTER — ANESTHESIA EVENT (OUTPATIENT)
Dept: PERIOP | Facility: HOSPITAL | Age: 88
End: 2023-06-19
Payer: MEDICARE

## 2023-06-19 PROCEDURE — 25010000002 PROPOFOL 200 MG/20ML EMULSION: Performed by: NURSE ANESTHETIST, CERTIFIED REGISTERED

## 2023-06-19 RX ORDER — PROPOFOL 10 MG/ML
INJECTION, EMULSION INTRAVENOUS AS NEEDED
Status: DISCONTINUED | OUTPATIENT
Start: 2023-06-19 | End: 2023-06-19 | Stop reason: SURG

## 2023-06-19 RX ADMIN — PROPOFOL INJECTABLE EMULSION 50 MG: 10 INJECTION, EMULSION INTRAVENOUS at 12:50

## 2023-06-19 RX ADMIN — PROPOFOL INJECTABLE EMULSION 50 MG: 10 INJECTION, EMULSION INTRAVENOUS at 12:46

## 2023-06-19 NOTE — ANESTHESIA POSTPROCEDURE EVALUATION
Patient: Aria Fernando    Procedure Summary     Date: 06/19/23 Room / Location: [facility] ENDOSCOPY 1 /  [facility]    Anesthesia Start: 1240 Anesthesia Stop: 1257    Procedure: ESOPHAGOGASTRODUODENOSCOPY (Esophagus) Diagnosis:       Anemia due to GI blood loss      Reflux esophagitis      Gastritis      (Anemia due to GI blood loss [D50.0])    Surgeons: Emiliano Rodriguez MD Provider: Juan Miguel Kay CRNA    Anesthesia Type: MAC ASA Status: 3          Anesthesia Type: MAC    Vitals  Vitals Value Taken Time   /65 06/19/23 1330   Temp 97.6 °F (36.4 °C) 06/19/23 1300   Pulse 60 06/19/23 1331   Resp 21 06/19/23 1330   SpO2 96 % 06/19/23 1331   Vitals shown include unvalidated device data.        Post Anesthesia Care and Evaluation    Patient location during evaluation: bedside  Patient participation: complete - patient participated  Level of consciousness: awake and alert  Pain score: 0  Pain management: adequate    Airway patency: patent  Anesthetic complications: No anesthetic complications  PONV Status: none  Cardiovascular status: acceptable  Respiratory status: acceptable  Hydration status: acceptable  No anesthesia care post op

## 2023-06-19 NOTE — ANESTHESIA PREPROCEDURE EVALUATION
Anesthesia Evaluation     no history of anesthetic complications:   NPO Solid Status: > 8 hours  NPO Liquid Status: > 8 hours           Airway   Mallampati: II  TM distance: <3 FB  Neck ROM: full  No difficulty expected  Dental    (+) edentulous    Pulmonary - normal exam   (-) asthma  Cardiovascular - normal exam  Exercise tolerance: poor (<4 METS)    ECG reviewed  Patient on routine beta blocker and Beta blocker given within 24 hours of surgery    (+) hypertension, CADdysrhythmias Atrial Fib, CHF Systolic <55%, PVD, DVT, hyperlipidemia  (-) angina    ROS comment: 8/31/22 - ·Findings consistent with a normal ECG stress test.  ·Left ventricular ejection fraction is normal. (Calculated EF = 50%).  ·Myocardial perfusion imaging indicates a small-to-medium-sized infarct located in the lateral wall with mild kirk-infarct ischemia.  ·Impressions are consistent with an intermediate risk study.         Neuro/Psych- negative ROS  GI/Hepatic/Renal/Endo    (+) GERD, GI bleeding , renal disease CRI    Musculoskeletal     Abdominal  - normal exam   Substance History - negative use     OB/GYN negative ob/gyn ROS         Other   arthritis,                   Anesthesia Plan    ASA 3     MAC   total IV anesthesia  intravenous induction     Anesthetic plan, risks, benefits, and alternatives have been provided, discussed and informed consent has been obtained with: patient.    Use of blood products discussed with patient  Consented to blood products.      CODE STATUS:    Level Of Support Discussed With: Patient  Code Status (Patient has no pulse and is not breathing): CPR (Attempt to Resuscitate)  Medical Interventions (Patient has pulse or is breathing): Full Support

## 2023-08-01 ENCOUNTER — APPOINTMENT (OUTPATIENT)
Dept: ULTRASOUND IMAGING | Facility: HOSPITAL | Age: 88
DRG: 812 | End: 2023-08-01
Payer: MEDICARE

## 2023-08-01 ENCOUNTER — APPOINTMENT (OUTPATIENT)
Dept: GENERAL RADIOLOGY | Facility: HOSPITAL | Age: 88
DRG: 812 | End: 2023-08-01
Payer: MEDICARE

## 2023-08-01 ENCOUNTER — HOSPITAL ENCOUNTER (INPATIENT)
Facility: HOSPITAL | Age: 88
LOS: 3 days | Discharge: HOME HEALTH | DRG: 812 | End: 2023-08-04
Attending: EMERGENCY MEDICINE | Admitting: INTERNAL MEDICINE
Payer: MEDICARE

## 2023-08-01 ENCOUNTER — APPOINTMENT (OUTPATIENT)
Dept: CT IMAGING | Facility: HOSPITAL | Age: 88
DRG: 812 | End: 2023-08-01
Payer: MEDICARE

## 2023-08-01 DIAGNOSIS — D64.9 ACUTE ON CHRONIC ANEMIA: Primary | ICD-10-CM

## 2023-08-01 DIAGNOSIS — I50.33 ACUTE ON CHRONIC DIASTOLIC CONGESTIVE HEART FAILURE: ICD-10-CM

## 2023-08-01 LAB
ABO GROUP BLD: NORMAL
ALBUMIN SERPL-MCNC: 4 G/DL (ref 3.5–5.2)
ALBUMIN/GLOB SERPL: 2 G/DL
ALP SERPL-CCNC: 64 U/L (ref 39–117)
ALT SERPL W P-5'-P-CCNC: 8 U/L (ref 1–33)
ANION GAP SERPL CALCULATED.3IONS-SCNC: 11.7 MMOL/L (ref 5–15)
AST SERPL-CCNC: 14 U/L (ref 1–32)
BACTERIA UR QL AUTO: ABNORMAL /HPF
BASOPHILS # BLD AUTO: 0.05 10*3/MM3 (ref 0–0.2)
BASOPHILS NFR BLD AUTO: 0.5 % (ref 0–1.5)
BILIRUB SERPL-MCNC: 0.3 MG/DL (ref 0–1.2)
BILIRUB UR QL STRIP: NEGATIVE
BLD GP AB SCN SERPL QL: NEGATIVE
BUN SERPL-MCNC: 52 MG/DL (ref 8–23)
BUN/CREAT SERPL: 28.6 (ref 7–25)
CALCIUM SPEC-SCNC: 8.9 MG/DL (ref 8.2–9.6)
CHLORIDE SERPL-SCNC: 105 MMOL/L (ref 98–107)
CLARITY UR: CLEAR
CO2 SERPL-SCNC: 27.3 MMOL/L (ref 22–29)
COLOR UR: YELLOW
CREAT SERPL-MCNC: 1.82 MG/DL (ref 0.57–1)
DEPRECATED RDW RBC AUTO: 73.9 FL (ref 37–54)
EGFRCR SERPLBLD CKD-EPI 2021: 25.8 ML/MIN/1.73
EOSINOPHIL # BLD AUTO: 0.1 10*3/MM3 (ref 0–0.4)
EOSINOPHIL NFR BLD AUTO: 1 % (ref 0.3–6.2)
ERYTHROCYTE [DISTWIDTH] IN BLOOD BY AUTOMATED COUNT: 20.9 % (ref 12.3–15.4)
FLUAV RNA RESP QL NAA+PROBE: NOT DETECTED
FLUBV RNA RESP QL NAA+PROBE: NOT DETECTED
GLOBULIN UR ELPH-MCNC: 2 GM/DL
GLUCOSE SERPL-MCNC: 115 MG/DL (ref 65–99)
GLUCOSE UR STRIP-MCNC: NEGATIVE MG/DL
HCT VFR BLD AUTO: 21 % (ref 34–46.6)
HGB BLD-MCNC: 6.1 G/DL (ref 12–15.9)
HGB UR QL STRIP.AUTO: ABNORMAL
HOLD SPECIMEN: NORMAL
HYALINE CASTS UR QL AUTO: ABNORMAL /LPF
IMM GRANULOCYTES # BLD AUTO: 0.05 10*3/MM3 (ref 0–0.05)
IMM GRANULOCYTES NFR BLD AUTO: 0.5 % (ref 0–0.5)
KETONES UR QL STRIP: NEGATIVE
LEUKOCYTE ESTERASE UR QL STRIP.AUTO: ABNORMAL
LYMPHOCYTES # BLD AUTO: 1.96 10*3/MM3 (ref 0.7–3.1)
LYMPHOCYTES NFR BLD AUTO: 19.3 % (ref 19.6–45.3)
MAGNESIUM SERPL-MCNC: 2.5 MG/DL (ref 1.7–2.3)
MCH RBC QN AUTO: 28.9 PG (ref 26.6–33)
MCHC RBC AUTO-ENTMCNC: 29 G/DL (ref 31.5–35.7)
MCV RBC AUTO: 99.5 FL (ref 79–97)
MONOCYTES # BLD AUTO: 0.96 10*3/MM3 (ref 0.1–0.9)
MONOCYTES NFR BLD AUTO: 9.4 % (ref 5–12)
NEUTROPHILS NFR BLD AUTO: 69.3 % (ref 42.7–76)
NEUTROPHILS NFR BLD AUTO: 7.05 10*3/MM3 (ref 1.7–7)
NITRITE UR QL STRIP: NEGATIVE
NRBC BLD AUTO-RTO: 0 /100 WBC (ref 0–0.2)
PH UR STRIP.AUTO: 6 [PH] (ref 4.5–8)
PLATELET # BLD AUTO: 234 10*3/MM3 (ref 140–450)
PMV BLD AUTO: 10.9 FL (ref 6–12)
POTASSIUM SERPL-SCNC: 4 MMOL/L (ref 3.5–5.2)
PROT SERPL-MCNC: 6 G/DL (ref 6–8.5)
PROT UR QL STRIP: NEGATIVE
QT INTERVAL: 407 MS
RBC # BLD AUTO: 2.11 10*6/MM3 (ref 3.77–5.28)
RBC # UR STRIP: ABNORMAL /HPF
REF LAB TEST METHOD: ABNORMAL
RH BLD: NEGATIVE
SARS-COV-2 RNA RESP QL NAA+PROBE: NOT DETECTED
SODIUM SERPL-SCNC: 144 MMOL/L (ref 136–145)
SP GR UR STRIP: 1.01 (ref 1–1.03)
SQUAMOUS #/AREA URNS HPF: ABNORMAL /HPF
T&S EXPIRATION DATE: NORMAL
TROPONIN T SERPL HS-MCNC: 28 NG/L
TROPONIN T SERPL HS-MCNC: 29 NG/L
UROBILINOGEN UR QL STRIP: ABNORMAL
WBC # UR STRIP: ABNORMAL /HPF
WBC NRBC COR # BLD: 10.17 10*3/MM3 (ref 3.4–10.8)
WHOLE BLOOD HOLD SPECIMEN: NORMAL

## 2023-08-01 PROCEDURE — 85025 COMPLETE CBC W/AUTO DIFF WBC: CPT | Performed by: EMERGENCY MEDICINE

## 2023-08-01 PROCEDURE — 74176 CT ABD & PELVIS W/O CONTRAST: CPT

## 2023-08-01 PROCEDURE — 86901 BLOOD TYPING SEROLOGIC RH(D): CPT | Performed by: INTERNAL MEDICINE

## 2023-08-01 PROCEDURE — 84484 ASSAY OF TROPONIN QUANT: CPT

## 2023-08-01 PROCEDURE — 93010 ELECTROCARDIOGRAM REPORT: CPT | Performed by: INTERNAL MEDICINE

## 2023-08-01 PROCEDURE — 36415 COLL VENOUS BLD VENIPUNCTURE: CPT

## 2023-08-01 PROCEDURE — 86923 COMPATIBILITY TEST ELECTRIC: CPT

## 2023-08-01 PROCEDURE — 87636 SARSCOV2 & INF A&B AMP PRB: CPT

## 2023-08-01 PROCEDURE — 84484 ASSAY OF TROPONIN QUANT: CPT | Performed by: EMERGENCY MEDICINE

## 2023-08-01 PROCEDURE — 86900 BLOOD TYPING SEROLOGIC ABO: CPT

## 2023-08-01 PROCEDURE — 99285 EMERGENCY DEPT VISIT HI MDM: CPT

## 2023-08-01 PROCEDURE — P9016 RBC LEUKOCYTES REDUCED: HCPCS

## 2023-08-01 PROCEDURE — 71045 X-RAY EXAM CHEST 1 VIEW: CPT

## 2023-08-01 PROCEDURE — 81001 URINALYSIS AUTO W/SCOPE: CPT | Performed by: EMERGENCY MEDICINE

## 2023-08-01 PROCEDURE — 80053 COMPREHEN METABOLIC PANEL: CPT | Performed by: EMERGENCY MEDICINE

## 2023-08-01 PROCEDURE — 93005 ELECTROCARDIOGRAM TRACING: CPT | Performed by: EMERGENCY MEDICINE

## 2023-08-01 PROCEDURE — 86850 RBC ANTIBODY SCREEN: CPT | Performed by: INTERNAL MEDICINE

## 2023-08-01 PROCEDURE — 99223 1ST HOSP IP/OBS HIGH 75: CPT | Performed by: INTERNAL MEDICINE

## 2023-08-01 PROCEDURE — 83735 ASSAY OF MAGNESIUM: CPT | Performed by: EMERGENCY MEDICINE

## 2023-08-01 PROCEDURE — 36430 TRANSFUSION BLD/BLD COMPNT: CPT

## 2023-08-01 PROCEDURE — 86900 BLOOD TYPING SEROLOGIC ABO: CPT | Performed by: INTERNAL MEDICINE

## 2023-08-01 RX ORDER — BISACODYL 5 MG/1
5 TABLET, DELAYED RELEASE ORAL DAILY PRN
Status: DISCONTINUED | OUTPATIENT
Start: 2023-08-01 | End: 2023-08-01 | Stop reason: SDUPTHER

## 2023-08-01 RX ORDER — SODIUM CHLORIDE 9 MG/ML
125 INJECTION, SOLUTION INTRAVENOUS CONTINUOUS
Status: ACTIVE | OUTPATIENT
Start: 2023-08-01 | End: 2023-08-02

## 2023-08-01 RX ORDER — ACETAMINOPHEN 325 MG/1
650 TABLET ORAL EVERY 4 HOURS PRN
Status: DISCONTINUED | OUTPATIENT
Start: 2023-08-01 | End: 2023-08-04 | Stop reason: HOSPADM

## 2023-08-01 RX ORDER — AMLODIPINE BESYLATE 5 MG/1
5 TABLET ORAL DAILY
Status: DISCONTINUED | OUTPATIENT
Start: 2023-08-01 | End: 2023-08-04 | Stop reason: HOSPADM

## 2023-08-01 RX ORDER — SODIUM CHLORIDE 0.9 % (FLUSH) 0.9 %
10 SYRINGE (ML) INJECTION AS NEEDED
Status: DISCONTINUED | OUTPATIENT
Start: 2023-08-01 | End: 2023-08-04 | Stop reason: HOSPADM

## 2023-08-01 RX ORDER — BISACODYL 5 MG/1
5 TABLET, DELAYED RELEASE ORAL DAILY PRN
Status: DISCONTINUED | OUTPATIENT
Start: 2023-08-01 | End: 2023-08-04 | Stop reason: HOSPADM

## 2023-08-01 RX ORDER — AMIODARONE HYDROCHLORIDE 200 MG/1
200 TABLET ORAL DAILY
Status: DISCONTINUED | OUTPATIENT
Start: 2023-08-01 | End: 2023-08-04 | Stop reason: HOSPADM

## 2023-08-01 RX ORDER — GABAPENTIN 100 MG/1
100 CAPSULE ORAL 2 TIMES DAILY
Status: DISCONTINUED | OUTPATIENT
Start: 2023-08-01 | End: 2023-08-04 | Stop reason: HOSPADM

## 2023-08-01 RX ORDER — PRAVASTATIN SODIUM 20 MG
80 TABLET ORAL NIGHTLY
Status: DISCONTINUED | OUTPATIENT
Start: 2023-08-01 | End: 2023-08-04 | Stop reason: HOSPADM

## 2023-08-01 RX ORDER — PANTOPRAZOLE SODIUM 40 MG/1
40 TABLET, DELAYED RELEASE ORAL EVERY MORNING
Status: DISCONTINUED | OUTPATIENT
Start: 2023-08-02 | End: 2023-08-02

## 2023-08-01 RX ORDER — DOCUSATE SODIUM 100 MG/1
100 CAPSULE, LIQUID FILLED ORAL 2 TIMES DAILY PRN
Status: DISCONTINUED | OUTPATIENT
Start: 2023-08-01 | End: 2023-08-04 | Stop reason: HOSPADM

## 2023-08-01 RX ORDER — OMEGA-3S/DHA/EPA/FISH OIL/D3 300MG-1000
400 CAPSULE ORAL 2 TIMES DAILY
Status: DISCONTINUED | OUTPATIENT
Start: 2023-08-01 | End: 2023-08-04 | Stop reason: HOSPADM

## 2023-08-01 RX ORDER — SODIUM CHLORIDE 9 MG/ML
40 INJECTION, SOLUTION INTRAVENOUS AS NEEDED
Status: DISCONTINUED | OUTPATIENT
Start: 2023-08-01 | End: 2023-08-04 | Stop reason: HOSPADM

## 2023-08-01 RX ORDER — MIRTAZAPINE 15 MG/1
15 TABLET, FILM COATED ORAL NIGHTLY
Status: DISCONTINUED | OUTPATIENT
Start: 2023-08-01 | End: 2023-08-04 | Stop reason: HOSPADM

## 2023-08-01 RX ORDER — AMOXICILLIN 250 MG
2 CAPSULE ORAL 2 TIMES DAILY
Status: DISCONTINUED | OUTPATIENT
Start: 2023-08-01 | End: 2023-08-04 | Stop reason: HOSPADM

## 2023-08-01 RX ORDER — SODIUM CHLORIDE 0.9 % (FLUSH) 0.9 %
10 SYRINGE (ML) INJECTION EVERY 12 HOURS SCHEDULED
Status: DISCONTINUED | OUTPATIENT
Start: 2023-08-01 | End: 2023-08-04 | Stop reason: HOSPADM

## 2023-08-01 RX ORDER — FERROUS SULFATE TAB EC 324 MG (65 MG FE EQUIVALENT) 324 (65 FE) MG
324 TABLET DELAYED RESPONSE ORAL
Status: DISCONTINUED | OUTPATIENT
Start: 2023-08-02 | End: 2023-08-04 | Stop reason: HOSPADM

## 2023-08-01 RX ORDER — POLYETHYLENE GLYCOL 3350 17 G/17G
17 POWDER, FOR SOLUTION ORAL DAILY PRN
Status: DISCONTINUED | OUTPATIENT
Start: 2023-08-01 | End: 2023-08-04 | Stop reason: HOSPADM

## 2023-08-01 RX ORDER — BISACODYL 10 MG
10 SUPPOSITORY, RECTAL RECTAL DAILY PRN
Status: DISCONTINUED | OUTPATIENT
Start: 2023-08-01 | End: 2023-08-04 | Stop reason: HOSPADM

## 2023-08-01 RX ADMIN — SODIUM CHLORIDE 125 ML/HR: 9 INJECTION, SOLUTION INTRAVENOUS at 18:05

## 2023-08-01 RX ADMIN — SENNOSIDES AND DOCUSATE SODIUM 2 TABLET: 50; 8.6 TABLET ORAL at 20:24

## 2023-08-01 RX ADMIN — MIRTAZAPINE 15 MG: 15 TABLET, FILM COATED ORAL at 20:24

## 2023-08-01 RX ADMIN — GABAPENTIN 100 MG: 100 CAPSULE ORAL at 20:24

## 2023-08-01 RX ADMIN — Medication 10 ML: at 20:24

## 2023-08-01 RX ADMIN — PRAVASTATIN SODIUM 80 MG: 20 TABLET ORAL at 20:24

## 2023-08-01 RX ADMIN — SODIUM CHLORIDE 500 ML: 9 INJECTION, SOLUTION INTRAVENOUS at 13:48

## 2023-08-01 RX ADMIN — CHOLECALCIFEROL (VITAMIN D3) 10 MCG (400 UNIT) TABLET 400 UNITS: at 20:24

## 2023-08-01 NOTE — ED PROVIDER NOTES
EMERGENCY DEPARTMENT ENCOUNTER      Room Number: 07/07    History is provided by the patient, no translation services needed    HPI:    Chief complaint: Generalized weakness    Location: Generalized    Quality/Severity: Patient denies any pain at this time.    Timing/Duration: Since yesterday    Modifying Factors: Ambulation worsens her symptoms.    Associated Symptoms: Shortness of air with ambulation    Narrative: Pt is a 89 y.o. female who presents complaining of generalized weakness and shortness of air with ambulation since yesterday.  She advises that when she rests, her symptoms improved.  When she is ambulating her symptoms worsen.  She denies any associated chest pain, abdominal pain, nausea, vomiting, diarrhea, blood in her stool, dysuria, or hematuria.  She denies any headaches, dizziness, or vision changes.  Family advises that the patient has had this happen several times in the past and that she has had received several blood transfusions.  The patient has been thoroughly evaluated and a source of her blood loss has not been identified.  The patient denies any complaints at this time, stating that since she is resting in the hospital bed she is feeling better.      PMD: Jimena Aj MD    REVIEW OF SYSTEMS  Review of Systems   Constitutional:  Negative for chills and fever.   Eyes:  Negative for photophobia and visual disturbance.   Respiratory:  Positive for shortness of breath (With ambulation). Negative for cough and chest tightness.    Cardiovascular:  Negative for chest pain, palpitations and leg swelling.   Gastrointestinal:  Negative for abdominal pain, blood in stool, constipation, diarrhea, nausea and vomiting.   Genitourinary:  Negative for difficulty urinating, dysuria, flank pain and hematuria.   Musculoskeletal:  Negative for back pain, gait problem and neck pain.   Skin:  Negative for color change, pallor, rash and wound.   Neurological:  Positive for weakness (Generalized).  Negative for dizziness, syncope, numbness and headaches.   Psychiatric/Behavioral:  Negative for confusion. The patient is not nervous/anxious.        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    H/O amiodarone therapy 08/17/2022    Anticoagulated 08/17/2022    Preop cardiovascular exam 08/17/2022    Occult GI bleeding 11/08/2022    Gastroesophageal reflux disease with esophagitis without hemorrhage 11/29/2022    Angiodysplasia 11/29/2022    CKD (chronic kidney disease) 11/29/2022    Anemia due to GI blood loss 06/18/2023     Resolved Ambulatory Problems     Diagnosis Date Noted    Acute respiratory failure with hypoxia 03/01/2018     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     On anticoagulant therapy     Osteoarthritis     Polycythemia     PVD (peripheral vascular disease)        PAST SURGICAL HISTORY  Past Surgical History:   Procedure Laterality Date    APPENDECTOMY      CAROTID ENDARTERECTOMY Left     COLONOSCOPY N/A 03/03/2018    Procedure:  Colonoscopy to Terminal ileum with APC treatment for 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 and cold biopsy;  Surgeon: Terrie Carroll MD;  Location: [facility];  Service:     COLONOSCOPY W/ POLYPECTOMY N/A 6/21/2023    Procedure: COLONOSCOPY WITH  POLYPECTOMY AND APC;  Surgeon: Emiliano Rodriguez MD;  Location:  [facility];  Service: Gastroenterology;  Laterality: N/A;  transverse polyp-forcep  cecal time 1659  APC  DESCENDING POLYP  SIGMOID POLYP    ENDOSCOPY N/A 03/03/2018    Procedure: EGD with biopsy;  Surgeon: Terrie Carroll MD;  Location: [facility] ENDOSCOPY;  Service:     ENDOSCOPY N/A 6/19/2023    Procedure: ESOPHAGOGASTRODUODENOSCOPY;  Surgeon: Emiliano Rodriguez MD;  Location:  [facility];  Service: Gastroenterology;  Laterality: N/A;  Reflux, Gastritis    KNEE ARTHROSCOPY Right        FAMILY HISTORY  No family history on file.    SOCIAL HISTORY  Social History     Socioeconomic History    Marital status: Single   Tobacco Use    Smoking status: Never    Smokeless tobacco: Never   Vaping Use    Vaping Use: Never used   Substance and Sexual Activity    Alcohol use: No    Drug use: No    Sexual activity: Never       ALLERGIES  Clinoril [sulindac], Codeine, Coumadin [warfarin sodium], Ibuprofen-oxycodone [oxycodone-ibuprofen], Keflex [cephalexin], Mydriacyl [tropicamide], Don-synephrine [phenylephrine], Penicillins, Phenylephrine hcl (pressors), Sulfa antibiotics, and Zantac [ranitidine hcl]      Current Facility-Administered Medications:     sodium chloride 0.9 % flush 10 mL, 10 mL, Intravenous, PRN, Jacek Pacheco MD    Current Outpatient Medications:     acetaminophen (TYLENOL) 325 MG tablet, Take 2 tablets by mouth Every 4 (Four) Hours As Needed for Mild Pain . (Patient taking differently: Take 500 mg by mouth Every 4 (Four) Hours As Needed for Mild Pain.), Disp: , Rfl:     alendronate (FOSAMAX) 70 MG tablet, Take 1 tablet by mouth Every 7 (Seven) Days., Disp: , Rfl:     amiodarone (PACERONE) 200 MG tablet, Take 1 tablet by mouth Daily., Disp: , Rfl:     amLODIPine (NORVASC) 5 MG tablet, Take 1 tablet by mouth Daily., Disp: , Rfl:     bisacodyl (DULCOLAX) 5 MG EC tablet, Take 1 tablet by mouth Daily As Needed for Constipation., Disp:  , Rfl:     Calcium Carb-Cholecalciferol (OYSTER SHELL CALCIUM/VITAMIN D) 250-125 MG-UNIT tablet tablet, Take 2 tablets by mouth 2 (Two) Times a Day., Disp: , Rfl:     cholecalciferol (VITAMIN D3) 400 units tablet, Take 1 tablet by mouth 2 (Two) Times a Day., Disp: , Rfl:     docusate sodium (COLACE) 100 MG capsule, Take 1 capsule by mouth 2 (Two) Times a Day As Needed., Disp: , Rfl:     ferrous sulfate 325 (65 FE) MG tablet, Take 1 tablet by mouth Daily With Breakfast., Disp: , Rfl:     gabapentin (NEURONTIN) 100 MG capsule, Take 1 capsule by mouth 2 (Two) Times a Day., Disp: , Rfl:     Magnesium Oxide 400 (240 Mg) MG tablet, Take 1 tablet by mouth Daily., Disp: , Rfl:     metoprolol tartrate (LOPRESSOR) 25 MG tablet, Take 0.5 tablets by mouth Every 12 (Twelve) Hours., Disp: 15 tablet, Rfl: 1    mirtazapine (REMERON) 15 MG tablet, Take 1 tablet by mouth Every Night., Disp: , Rfl:     pantoprazole (Protonix) 40 MG EC tablet, Take 1 tablet by mouth Daily., Disp: 30 tablet, Rfl: 1    pravastatin (PRAVACHOL) 80 MG tablet, , Disp: , Rfl:     PHYSICAL EXAM  ED Triage Vitals   Temp Heart Rate Resp BP SpO2   08/01/23 1305 08/01/23 1305 08/01/23 1305 08/01/23 1306 08/01/23 1305   97.8 øF (36.6 øC) 81 18 125/94 95 %      Temp src Heart Rate Source Patient Position BP Location FiO2 (%)   -- -- -- -- --              Physical Exam  Vitals and nursing note reviewed.   Constitutional:       General: She is not in acute distress.     Appearance: She is well-developed. She is not ill-appearing, toxic-appearing or diaphoretic.   HENT:      Head: Normocephalic and atraumatic.   Eyes:      Extraocular Movements: Extraocular movements intact.      Conjunctiva/sclera: Conjunctivae normal.      Pupils: Pupils are equal, round, and reactive to light.   Neck:      Vascular: No hepatojugular reflux or JVD.      Trachea: No tracheal deviation.   Cardiovascular:      Rate and Rhythm: Normal rate and regular rhythm.      Heart sounds: Normal  heart sounds.     No friction rub.   Pulmonary:      Effort: Pulmonary effort is normal. No tachypnea, bradypnea, accessory muscle usage or respiratory distress.      Breath sounds: Normal breath sounds. No stridor. No decreased breath sounds, wheezing, rhonchi or rales.   Chest:      Chest wall: No mass, deformity, tenderness, crepitus or edema. There is no dullness to percussion.   Abdominal:      General: Bowel sounds are normal. There is no distension.      Palpations: Abdomen is soft. There is no mass.      Tenderness: There is no abdominal tenderness. There is no guarding or rebound.   Musculoskeletal:         General: Normal range of motion.      Cervical back: Normal range of motion and neck supple.      Right lower leg: No tenderness. No edema.      Left lower leg: No tenderness. No edema.   Skin:     General: Skin is warm and dry.      Coloration: Skin is pale. Skin is not cyanotic.      Findings: No ecchymosis, erythema or rash.      Nails: There is no clubbing.   Neurological:      Mental Status: She is alert and oriented to person, place, and time.   Psychiatric:         Mood and Affect: Mood and affect normal.         LAB RESULTS  Lab Results (last 24 hours)       Procedure Component Value Units Date/Time    CBC & Differential [[id number]]  (Abnormal) Collected: 08/01/23 1315    Specimen: Blood Updated: 08/01/23 1323    Narrative:      The following orders were created for panel order CBC & Differential.  Procedure                               Abnormality         Status                     ---------                               -----------         ------                     CBC Auto Differential[[id number]]        Abnormal            Final result                 Please view results for these tests on the individual orders.    Comprehensive Metabolic Panel [[id number]]  (Abnormal) Collected: 08/01/23 1315    Specimen: Blood Updated: 08/01/23 1341     Glucose 115 mg/dL      BUN 52 mg/dL      Creatinine  1.82 mg/dL      Sodium 144 mmol/L      Potassium 4.0 mmol/L      Chloride 105 mmol/L      CO2 27.3 mmol/L      Calcium 8.9 mg/dL      Total Protein 6.0 g/dL      Albumin 4.0 g/dL      ALT (SGPT) 8 U/L      AST (SGOT) 14 U/L      Alkaline Phosphatase 64 U/L      Total Bilirubin 0.3 mg/dL      Globulin 2.0 gm/dL      A/G Ratio 2.0 g/dL      BUN/Creatinine Ratio 28.6     Anion Gap 11.7 mmol/L      eGFR 25.8 mL/min/1.73     Narrative:      GFR Normal >60  Chronic Kidney Disease <60  Kidney Failure <15    The GFR formula is only valid for adults with stable renal function between ages 18 and 70.    Single High Sensitivity Troponin T [[id number]]  (Abnormal) Collected: 08/01/23 1315    Specimen: Blood Updated: 08/01/23 1340     HS Troponin T 29 ng/L     Narrative:      High Sensitive Troponin T Reference Range:  <10.0 ng/L- Negative Female for AMI  <15.0 ng/L- Negative Male for AMI  >=10 - Abnormal Female indicating possible myocardial injury.  >=15 - Abnormal Male indicating possible myocardial injury.   Clinicians would have to utilize clinical acumen, EKG, Troponin, and serial changes to determine if it is an Acute Myocardial Infarction or myocardial injury due to an underlying chronic condition.         Magnesium [[id number]]  (Abnormal) Collected: 08/01/23 1315    Specimen: Blood Updated: 08/01/23 1341     Magnesium 2.5 mg/dL     CBC Auto Differential [[id number]]  (Abnormal) Collected: 08/01/23 1315    Specimen: Blood Updated: 08/01/23 1323     WBC 10.17 10*3/mm3      RBC 2.11 10*6/mm3      Hemoglobin 6.1 g/dL      Hematocrit 21.0 %      MCV 99.5 fL      MCH 28.9 pg      MCHC 29.0 g/dL      RDW 20.9 %      RDW-SD 73.9 fl      MPV 10.9 fL      Platelets 234 10*3/mm3      Neutrophil % 69.3 %      Lymphocyte % 19.3 %      Monocyte % 9.4 %      Eosinophil % 1.0 %      Basophil % 0.5 %      Immature Grans % 0.5 %      Neutrophils, Absolute 7.05 10*3/mm3      Lymphocytes, Absolute 1.96 10*3/mm3      Monocytes, Absolute 0.96  10*3/mm3      Eosinophils, Absolute 0.10 10*3/mm3      Basophils, Absolute 0.05 10*3/mm3      Immature Grans, Absolute 0.05 10*3/mm3      nRBC 0.0 /100 WBC     COVID-19 and FLU A/B PCR - Swab, Nasopharynx [[id number]]  (Normal) Collected: 08/01/23 1340    Specimen: Swab from Nasopharynx Updated: 08/01/23 1403     COVID19 Not Detected     Influenza A PCR Not Detected     Influenza B PCR Not Detected    Narrative:      Fact sheet for providers: https://www.fda.gov/media/551560/download    Fact sheet for patients: https://www.fda.gov/media/447937/download    Test performed by PCR.    Urinalysis With Microscopic If Indicated (No Culture) - Urine, Clean Catch [[id number]]  (Abnormal) Collected: 08/01/23 1443    Specimen: Urine, Clean Catch Updated: 08/01/23 1457     Color, UA Yellow     Appearance, UA Clear     pH, UA 6.0     Specific Gravity, UA 1.015     Glucose, UA Negative     Ketones, UA Negative     Bilirubin, UA Negative     Blood, UA Small (1+)     Protein, UA Negative     Leuk Esterase, UA Small (1+)     Nitrite, UA Negative     Urobilinogen, UA 0.2 E.U./dL    Urinalysis, Microscopic Only - Urine, Clean Catch [[id number]]  (Abnormal) Collected: 08/01/23 1443    Specimen: Urine, Clean Catch Updated: 08/01/23 1522     RBC, UA 3-5 /HPF      WBC, UA 6-12 /HPF      Bacteria, UA 4+ /HPF      Squamous Epithelial Cells, UA 0-2 /HPF      Hyaline Casts, UA None Seen /LPF      Methodology Manual Light Microscopy    Single High Sensitivity Troponin T [[id number]]  (Abnormal) Collected: 08/01/23 1446    Specimen: Blood from Arm, Right Updated: 08/01/23 1507     HS Troponin T 28 ng/L     Narrative:      High Sensitive Troponin T Reference Range:  <10.0 ng/L- Negative Female for AMI  <15.0 ng/L- Negative Male for AMI  >=10 - Abnormal Female indicating possible myocardial injury.  >=15 - Abnormal Male indicating possible myocardial injury.   Clinicians would have to utilize clinical acumen, EKG, Troponin, and serial changes to  determine if it is an Acute Myocardial Infarction or myocardial injury due to an underlying chronic condition.                   I ordered the above labs and reviewed the results    RADIOLOGY  CT Abdomen Pelvis Without Contrast    Result Date: 8/1/2023  CT ABDOMEN AND PELVIS WITHOUT CONTRAST 8/1/2023  HISTORY: 89-year-old female with generalized weakness and shortness of air. Low hemoglobin and red blood cell count.  TECHNIQUE: Noncontrast CT of the abdomen and pelvis was performed. Sagittal and coronal reformats performed. No comparisons. Radiation dose reduction techniques included automated exposure control or exposure modulation based on body size. Radiation audit for CT and nuclear cardiology exams in the last 12 months: 0.  ABDOMEN FINDINGS: Included lung bases show areas of atelectasis. Old healed granulomatous disease. The heart is enlarged. There is no effusion. Normal caliber aorta with advanced atherosclerotic change. The spleen and adrenal glands are negative and the pancreas is negative. Uncomplicated cholelithiasis. Liver and spleen both show granulomatous changes. The kidneys are nonobstructed. There is atrophy and cortical scarring bilaterally. Probable faint renal vascular calcifications on the left. No distinct ureteral stone. In the lateral midpole left kidney there is a mildly hyperdense exophytic lesion probably representing a hemorrhagic cyst and measuring 10 mm. This could be confirmed with nonemergent renal ultrasound. There is no threshold adenopathy.  PELVIS FINDINGS: Leiomyomatous change of the uterus. No drainable fluid collection. Negative bladder. There is no bowel obstruction or focal inflammatory change of the bowel. Negative terminal ileum. Appendix surgically absent by history. Inguinal canals are negative. There are advanced degenerative changes in the lower lumbar spine.  negative.      1. No clearly acute process in the abdomen or pelvis. No bowel obstruction drainable fluid  collection or focal area of inflammatory change. Surgically absent appendix. 2. Uncomplicated cholelithiasis. 3. Probable proteinaceous/hemorrhagic cyst in the posterior lateral left kidney measuring 10 mm. This could be confirmed with renal ultrasound. Additional benign simple cyst arising from the lower pole left kidney requiring no additional follow-up. 4. Leiomyomatous change of uterus.   This report was finalized on 8/1/2023 3:39 PM by Dr. Tristan Cooley MD.      XR Chest 1 View    Result Date: 8/1/2023  XR CHEST 1 VW-: 8/1/2023 3:29 PM  INDICATION: Short of air 2 days. HISTORY of congestive heart failure hypertension and coronary artery disease. Former smoker.  COMPARISON:  6/18/2023.  FINDINGS: Single Portable AP view(s) of the chest. Cardiac silhouette is enlarged. The aortic knob is prominent and atherosclerotic, unchanged. Interstitial prominence and cephalization most characteristic of mild volume overload with probable mild perihilar edema. There is no dense consolidation or effusion. No pneumothorax. There is thoracic spondylosis.       1. Cardiomegaly and probable volume overload. Inflammatory/infectious infiltrates felt to be less likely. Follow-up to clearing recommended. No pneumothorax.  This report was finalized on 8/1/2023 2:42 PM by Dr. Tristan Cooley MD.       I ordered the above radiologic testing and reviewed the results    PROCEDURES  Procedures      PROGRESS AND CONSULTS  ED Course as of 08/01/23 1605   Tue Aug 01, 2023   1320 The patient appears slightly pale.  Her vital signs are stable and within normal limits.  She is able to answer all questions appropriately.  She denies any rectal bleeding.  Family states that this is happened several times in the past and they are unsure of where the patient is losing blood from.  She denies any complaints at this time. [AH]   1321 EKG         EKG time / Interpretation time: 1313 / 1315  Rhythm/Rate: Sinus, 71   NV: 193  QRS, axis: -18  QTc 442  ST  and T waves: No acute ST segment changes or T wave abnormalities.  EKG Tracing Interpreted Contemporaneously by me, independently viewed by me and MD.   []   1436 Dr. Newman has agreed to admit the patient and will be working on the admission. [AH]      ED Course User Index  [AH] Susan Mott PA-C           MEDICAL DECISION MAKING    MDM       My differential diagnosis includes but is not limited to generalized weakness, electrolyte abnormality, CVA, TIA, Bell's palsy, acute MI, GI bleed, urinary tract infection, systemic infections including sepsis, alcohol abuse, drug abuse including prescription and street drug.   DIAGNOSIS  Final diagnoses:   Acute on chronic anemia       Latest Documented Vital Signs:  As of 16:05 EDT  BP- 115/57 HR- 71 Temp- 97.8 øF (36.6 øC) O2 sat- 97%    DISPOSITION  Pt admitted    Discussed pertinent findings with the patient/family.  Patient/Family voiced understanding of need to follow-up for recheck and further testing as needed.  Return to the Emergency Department warnings were given.         Medication List      No changes were made to your prescriptions during this visit.                   Dictated utilizing Dragon dictation     Susan Mott PA-C  08/01/23 0630

## 2023-08-01 NOTE — H&P
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  She presents to [facility] ED complaining of generalized weakness since yesterday.  Her symptoms worsen with any type of walking, and she becomes short of breath.  She denies chest pain abdominal pain vomiting nausea or diarrhea.  She reports no blood in her stool.  Family reported in ED this is similar to past presentations where she has had GI bleed.  They have not been able to identify any source of bleeding at this time.     Work-up in the ED revealed patient have hemoglobin 6.1, BUN elevated at 52, no active bleeding seen in ED. CT abdomen and pelvis showed possible hemorrhagic cyst of the kidney.       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     On anticoagulant therapy     Osteoarthritis     Polycythemia     PVD (peripheral vascular disease)      Past Surgical History:   Procedure Laterality Date    APPENDECTOMY      CAROTID ENDARTERECTOMY Left     COLONOSCOPY N/A 03/03/2018    Procedure:  Colonoscopy to Terminal ileum with APC treatment for 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 and cold biopsy;  Surgeon: Terrie Carroll MD;  Location: [facility] ENDOSCOPY;  Service:     COLONOSCOPY W/ POLYPECTOMY N/A 6/21/2023    Procedure: COLONOSCOPY WITH POLYPECTOMY AND APC;   "Surgeon: Emiliano Rodriguez MD;  Location:  [facility] OR;  Service: Gastroenterology;  Laterality: N/A;  transverse polyp-forcep  cecal time 1659  APC  DESCENDING POLYP  SIGMOID POLYP    ENDOSCOPY N/A 03/03/2018    Procedure: EGD with biopsy;  Surgeon: Terrie Carroll MD;  Location:  [facility];  Service:     ENDOSCOPY N/A 6/19/2023    Procedure: ESOPHAGOGASTRODUODENOSCOPY;  Surgeon: Emiliano Rodriguez MD;  Location:  [facility] OR;  Service: Gastroenterology;  Laterality: N/A;  Reflux, Gastritis    KNEE ARTHROSCOPY Right      No family history on file.  Social History     Tobacco Use    Smoking status: Never    Smokeless tobacco: Never   Vaping Use    Vaping Use: Never used   Substance Use Topics    Alcohol use: No    Drug use: No     (Not in a hospital admission)    Allergies:  Clinoril [sulindac], Codeine, Coumadin [warfarin sodium], Ibuprofen-oxycodone [oxycodone-ibuprofen], Keflex [cephalexin], Mydriacyl [tropicamide], Don-synephrine [phenylephrine], Penicillins, Phenylephrine hcl (pressors), Sulfa antibiotics, and Zantac [ranitidine hcl]    Immunization History   Administered Date(s) Administered    COVID-19 (PFIZER) Purple Cap Monovalent 01/06/2021, 01/27/2021, 12/09/2021, 07/14/2022    Influenza Seasonal Injectable 11/01/2018    Influenza, Unspecified 10/31/2017    Tdap 11/11/2021       REVIEW OF SYSTEMS:  Please see the above history of present illness for pertinent positives and negatives.  The remainder of the patient's systems have been reviewed and are negative.     Vital Signs  Temp:  [97.8 øF (36.6 øC)] 97.8 øF (36.6 øC)  Heart Rate:  [68-86] 68  Resp:  [18] 18  BP: (100-129)/(52-94) 101/52  Flowsheet Rows      Flowsheet Row First Filed Value   Admission Height 154.9 cm (61\") Documented at 08/01/2023 1305   Admission Weight 82.8 kg (182 lb 8 oz) Documented at 08/01/2023 1305               Physical Exam:  General: Patient is awake and alert.  Pale appearing elderly female. No acute " distress noted.   HENT: Head is atraumatic, normocephalic. Hearing is grossly intact. Nose is without obvious congestion and appears patent. Neck is supple and trachea is midline.   Eyes: Vision is grossly intact. Pupils appear equal and round.   Cardiovascular: Heart has regular rate and rhythm with no murmurs, rubs or gallops noted.   Respiratory: Lungs are clear to ausculation without wheezes, rhonchi or rales.   Abdominal/GI: Soft, nontender, bowel sounds present. No rebound or guarding present.   Extremities: No peripheral edema noted.   Musculoskeletal: Spontaneous movement of bilateral upper and lower extremities against gravity noted. No signs of injury or deformity noted.   Skin: Warm and dry.   Psych: Mood and affect are appropriate. Cooperative with exam.   Neuro: No facial asymmetry noted. No focal deficits noted, hearing and vision are grossly intact.    Emotional Behavior: all of the following were found to be normal   Judgment and Insight   Mental Status   Memory   Mood and Affect: neither of the following found acutely        Depression                 Anxiety     Debilities: no signs of the following found    Physical Weakness     Handicaps     Disabilities     Agitation         Results Review:    I reviewed the patient's new clinical results.  Lab Results (most recent)       Procedure Component Value Units Date/Time    Urinalysis, Microscopic Only - Urine, Clean Catch [[id number]]  (Abnormal) Collected: 08/01/23 1443    Specimen: Urine, Clean Catch Updated: 08/01/23 1522     RBC, UA 3-5 /HPF      WBC, UA 6-12 /HPF      Bacteria, UA 4+ /HPF      Squamous Epithelial Cells, UA 0-2 /HPF      Hyaline Casts, UA None Seen /LPF      Methodology Manual Light Microscopy    Single High Sensitivity Troponin T [[id number]]  (Abnormal) Collected: 08/01/23 1446    Specimen: Blood from Arm, Right Updated: 08/01/23 1507     HS Troponin T 28 ng/L     Narrative:      High Sensitive Troponin T Reference Range:  <10.0  ng/L- Negative Female for AMI  <15.0 ng/L- Negative Male for AMI  >=10 - Abnormal Female indicating possible myocardial injury.  >=15 - Abnormal Male indicating possible myocardial injury.   Clinicians would have to utilize clinical acumen, EKG, Troponin, and serial changes to determine if it is an Acute Myocardial Infarction or myocardial injury due to an underlying chronic condition.         Urinalysis With Microscopic If Indicated (No Culture) - Urine, Clean Catch [[id number]]  (Abnormal) Collected: 08/01/23 1443    Specimen: Urine, Clean Catch Updated: 08/01/23 1457     Color, UA Yellow     Appearance, UA Clear     pH, UA 6.0     Specific Gravity, UA 1.015     Glucose, UA Negative     Ketones, UA Negative     Bilirubin, UA Negative     Blood, UA Small (1+)     Protein, UA Negative     Leuk Esterase, UA Small (1+)     Nitrite, UA Negative     Urobilinogen, UA 0.2 E.U./dL    [place] Draw [[id number]] Collected: 08/01/23 1315    Specimen: Blood Updated: 08/01/23 1431    Narrative:      The following orders were created for panel order [place] Draw.  Procedure                               Abnormality         Status                     ---------                               -----------         ------                     Green Top (Gel)[[id number]]                                  Final result               Lavender Top[[id number]]                                     Final result               Gold Top - [facility][[id number]]                                                              Light Blue Top[[id number]]                                                                Please view results for these tests on the individual orders.    Green Top (Gel) [[id number]] Collected: 08/01/23 1315    Specimen: Blood Updated: 08/01/23 1431     Extra Tube Hold for add-ons.     Comment: Auto resulted.       Lavender Top [[id number]] Collected: 08/01/23 1315    Specimen: Blood Updated: 08/01/23 1431     Extra Tube hold for add-on      Comment: Auto resulted       COVID-19 and FLU A/B PCR - Swab, Nasopharynx [[id number]]  (Normal) Collected: 08/01/23 1340    Specimen: Swab from Nasopharynx Updated: 08/01/23 1403     COVID19 Not Detected     Influenza A PCR Not Detected     Influenza B PCR Not Detected    Narrative:      Fact sheet for providers: https://www.fda.gov/media/263416/download    Fact sheet for patients: https://www.fda.gov/media/756226/download    Test performed by PCR.    Comprehensive Metabolic Panel [[id number]]  (Abnormal) Collected: 08/01/23 1315    Specimen: Blood Updated: 08/01/23 1341     Glucose 115 mg/dL      BUN 52 mg/dL      Creatinine 1.82 mg/dL      Sodium 144 mmol/L      Potassium 4.0 mmol/L      Chloride 105 mmol/L      CO2 27.3 mmol/L      Calcium 8.9 mg/dL      Total Protein 6.0 g/dL      Albumin 4.0 g/dL      ALT (SGPT) 8 U/L      AST (SGOT) 14 U/L      Alkaline Phosphatase 64 U/L      Total Bilirubin 0.3 mg/dL      Globulin 2.0 gm/dL      A/G Ratio 2.0 g/dL      BUN/Creatinine Ratio 28.6     Anion Gap 11.7 mmol/L      eGFR 25.8 mL/min/1.73     Narrative:      GFR Normal >60  Chronic Kidney Disease <60  Kidney Failure <15    The GFR formula is only valid for adults with stable renal function between ages 18 and 70.    Magnesium [[id number]]  (Abnormal) Collected: 08/01/23 1315    Specimen: Blood Updated: 08/01/23 1341     Magnesium 2.5 mg/dL     Single High Sensitivity Troponin T [[id number]]  (Abnormal) Collected: 08/01/23 1315    Specimen: Blood Updated: 08/01/23 1340     HS Troponin T 29 ng/L     Narrative:      High Sensitive Troponin T Reference Range:  <10.0 ng/L- Negative Female for AMI  <15.0 ng/L- Negative Male for AMI  >=10 - Abnormal Female indicating possible myocardial injury.  >=15 - Abnormal Male indicating possible myocardial injury.   Clinicians would have to utilize clinical acumen, EKG, Troponin, and serial changes to determine if it is an Acute Myocardial Infarction or myocardial injury due to an  underlying chronic condition.         CBC & Differential [[id number]]  (Abnormal) Collected: 08/01/23 1315    Specimen: Blood Updated: 08/01/23 1323    Narrative:      The following orders were created for panel order CBC & Differential.  Procedure                               Abnormality         Status                     ---------                               -----------         ------                     CBC Auto Differential[[id number]]        Abnormal            Final result                 Please view results for these tests on the individual orders.    CBC Auto Differential [[id number]]  (Abnormal) Collected: 08/01/23 1315    Specimen: Blood Updated: 08/01/23 1323     WBC 10.17 10*3/mm3      RBC 2.11 10*6/mm3      Hemoglobin 6.1 g/dL      Hematocrit 21.0 %      MCV 99.5 fL      MCH 28.9 pg      MCHC 29.0 g/dL      RDW 20.9 %      RDW-SD 73.9 fl      MPV 10.9 fL      Platelets 234 10*3/mm3      Neutrophil % 69.3 %      Lymphocyte % 19.3 %      Monocyte % 9.4 %      Eosinophil % 1.0 %      Basophil % 0.5 %      Immature Grans % 0.5 %      Neutrophils, Absolute 7.05 10*3/mm3      Lymphocytes, Absolute 1.96 10*3/mm3      Monocytes, Absolute 0.96 10*3/mm3      Eosinophils, Absolute 0.10 10*3/mm3      Basophils, Absolute 0.05 10*3/mm3      Immature Grans, Absolute 0.05 10*3/mm3      nRBC 0.0 /100 WBC             Imaging Results (Most Recent)       Procedure Component Value Units Date/Time    CT Abdomen Pelvis Without Contrast [[id number]] Collected: 08/01/23 1431     Updated: 08/01/23 1541    Narrative:      CT ABDOMEN AND PELVIS WITHOUT CONTRAST 8/1/2023     HISTORY:  89-year-old female with generalized weakness and shortness of air. Low  hemoglobin and red blood cell count.     TECHNIQUE:   Noncontrast CT of the abdomen and pelvis was performed. Sagittal and  coronal reformats performed. No comparisons. Radiation dose reduction  techniques included automated exposure control or exposure modulation  based on body  size. Radiation audit for CT and nuclear cardiology exams  in the last 12 months: 0.     ABDOMEN FINDINGS:   Included lung bases show areas of atelectasis. Old healed granulomatous  disease. The heart is enlarged. There is no effusion. Normal caliber  aorta with advanced atherosclerotic change. The spleen and adrenal  glands are negative and the pancreas is negative. Uncomplicated  cholelithiasis. Liver and spleen both show granulomatous changes. The  kidneys are nonobstructed. There is atrophy and cortical scarring  bilaterally. Probable faint renal vascular calcifications on the left.  No distinct ureteral stone. In the lateral midpole left kidney there is  a mildly hyperdense exophytic lesion probably representing a hemorrhagic  cyst and measuring 10 mm. This could be confirmed with nonemergent renal  ultrasound. There is no threshold adenopathy.     PELVIS FINDINGS:   Leiomyomatous change of the uterus. No drainable fluid collection.  Negative bladder. There is no bowel obstruction or focal inflammatory  change of the bowel. Negative terminal ileum. Appendix surgically absent  by history. Inguinal canals are negative. There are advanced  degenerative changes in the lower lumbar spine.  negative.       Impression:      1. No clearly acute process in the abdomen or pelvis. No bowel  obstruction drainable fluid collection or focal area of inflammatory  change. Surgically absent appendix.  2. Uncomplicated cholelithiasis.  3. Probable proteinaceous/hemorrhagic cyst in the posterior lateral left  kidney measuring 10 mm. This could be confirmed with renal ultrasound.  Additional benign simple cyst arising from the lower pole left kidney  requiring no additional follow-up.  4. Leiomyomatous change of uterus.        This report was finalized on 8/1/2023 3:39 PM by Dr. Tristan Cooley MD.       XR Chest 1 View [[id number]] Collected: 08/01/23 1341     Updated: 08/01/23 1444    Narrative:      XR CHEST 1 VW-: 8/1/2023  3:29 PM     INDICATION:   Short of air 2 days. HISTORY of congestive heart failure hypertension  and coronary artery disease. Former smoker.     COMPARISON:    6/18/2023.     FINDINGS:  Single Portable AP view(s) of the chest. Cardiac silhouette is enlarged.  The aortic knob is prominent and atherosclerotic, unchanged.  Interstitial prominence and cephalization most characteristic of mild  volume overload with probable mild perihilar edema. There is no dense  consolidation or effusion. No pneumothorax. There is thoracic  spondylosis.       Impression:         1. Cardiomegaly and probable volume overload. Inflammatory/infectious  infiltrates felt to be less likely. Follow-up to clearing recommended.  No pneumothorax.     This report was finalized on 8/1/2023 2:42 PM by Dr. Tristan Cooley MD.             Reviewed    ECG/EMG Results (most recent)       Procedure Component Value Units Date/Time    ECG 12 Lead ED Triage Standing Order; Weak / Dizzy / AMS [[id number]] Collected: 08/01/23 1313     Updated: 08/01/23 1315     QT Interval 407 ms     Narrative:      HEART RATE= 71  bpm  RR Interval= 848  ms  MT Interval= 193  ms  P Horizontal Axis= 135  deg  P Front Axis= -6  deg  QRSD Interval= 99  ms  QT Interval= 407  ms  QRS Axis= -18  deg  T Wave Axis= 119  deg  - OTHERWISE NORMAL ECG -  Sinus rhythm  Borderline left axis deviation  Electronically Signed By:   Date and Time of Study: 2023-08-01 13:13:42          Reviewed    Assessment & Plan     Acute on chronic anemia  -Hemoglobin at baseline approximately 8-9, currently 6.1  -Suspected etiology is hemorrhagic cyst of the kidney as seen on CT abdomen pelvis, will further evaluate with renal ultrasound, plan urology consult once hemorrhagic cyst confirmed.   -We will transfuse patient with 2 units packed red blood cells and check H&H thereafter  -Anemia is currently macrocytic.   -BUN elevated likely showing active bleeding, will follow CBC closely.     Elevated troponin  likely secondary to above  -Initial troponin was 29 down trended to 28.   -Has down trended with 2-hour recheck, no chest pain reported by patient.   -Monitor    Elevated creatinine  -Suspect secondary to hypovolemia with active bleeding  -Creatinine at baseline appears to be 1.2-1.3, currently 1.82   -we will place on IV fluid and monitor    Asymptomatic bacteriuria  -UA shows 6-12 WBCs, 4+ bacteria, and small leuk esterase  -Patient is asymptomatic, monitor for symptoms        Chronic stable conditions to be managed with home medications include the following conditions listed below. Also please note: Every effort was made to accurately obtain patient's home medications. This was done utilizing extensive chart review, ED documentation, recent pharmacy records, and where possible thorough discussion with the patient if medications were known by the patient. I have reviewed and edited the patient's home medications as per my efforts above and current med list can be found within home medications portion of this electronic medical record and is accurate as possible as of 08/01/23     Osteoporosis-resume alendronate on discharge    Hypertension-continue amlodipine and metoprolol    Iron deficiency anemia-continue home iron supplement, treating anemia otherwise as above    Peripheral neuropathy-continue home gabapentin    Depression and loss of appetite-continue mirtazapine    GERD-utilize Protonix for home PPI substitute    Hyperlipidemia-continue pravastatin    DVT PPX: SCDs        I discussed the patient's findings and my recommendations with patient     Alberto MARINA DO [place]  08/01/23  15:53 EDT    Time: 37 mins     At [facility], we believe that sharing information builds trust and better relationships. You are receiving this note because you recently visited [facility]. It is possible you will see health information before a provider has talked with you about it. This kind of information can be easy to  misunderstand. To help you fully understand what it means for your health, we urge you to discuss this note with your provider.

## 2023-08-01 NOTE — PLAN OF CARE
Pt arrived to the unit from ED at 1730.  Pt is alert and oriented and currently resting in her bed. Pt to receive x2 units of PRBCs this evening due to a hgb of 6.1. We are currently awaiting blood work. Pt verbalizes understanding to this POC at this time.   Problem: Adult Inpatient Plan of Care  Goal: Plan of Care Review  Outcome: Ongoing, Progressing  Goal: Patient-Specific Goal (Individualized)  Outcome: Ongoing, Progressing  Goal: Absence of Hospital-Acquired Illness or Injury  Outcome: Ongoing, Progressing  Goal: Optimal Comfort and Wellbeing  Outcome: Ongoing, Progressing  Goal: Readiness for Transition of Care  Outcome: Ongoing, Progressing     Problem: Fall Injury Risk  Goal: Absence of Fall and Fall-Related Injury  Outcome: Ongoing, Progressing     Problem: Skin Injury Risk Increased  Goal: Skin Health and Integrity  Outcome: Ongoing, Progressing   Goal Outcome Evaluation:

## 2023-08-02 ENCOUNTER — APPOINTMENT (OUTPATIENT)
Dept: ULTRASOUND IMAGING | Facility: HOSPITAL | Age: 88
DRG: 812 | End: 2023-08-02
Payer: MEDICARE

## 2023-08-02 LAB
ANION GAP SERPL CALCULATED.3IONS-SCNC: 10 MMOL/L (ref 5–15)
BASOPHILS # BLD AUTO: 0.03 10*3/MM3 (ref 0–0.2)
BASOPHILS NFR BLD AUTO: 0.3 % (ref 0–1.5)
BH BB BLOOD EXPIRATION DATE: NORMAL
BH BB BLOOD EXPIRATION DATE: NORMAL
BH BB BLOOD TYPE BARCODE: 9500
BH BB BLOOD TYPE BARCODE: 9500
BH BB DISPENSE STATUS: NORMAL
BH BB DISPENSE STATUS: NORMAL
BH BB PRODUCT CODE: NORMAL
BH BB PRODUCT CODE: NORMAL
BH BB UNIT NUMBER: NORMAL
BH BB UNIT NUMBER: NORMAL
BUN SERPL-MCNC: 55 MG/DL (ref 8–23)
BUN/CREAT SERPL: 34 (ref 7–25)
CALCIUM SPEC-SCNC: 8.2 MG/DL (ref 8.2–9.6)
CHLORIDE SERPL-SCNC: 108 MMOL/L (ref 98–107)
CO2 SERPL-SCNC: 26 MMOL/L (ref 22–29)
CREAT SERPL-MCNC: 1.62 MG/DL (ref 0.57–1)
CROSSMATCH INTERPRETATION: NORMAL
CROSSMATCH INTERPRETATION: NORMAL
DEPRECATED RDW RBC AUTO: 63.3 FL (ref 37–54)
EGFRCR SERPLBLD CKD-EPI 2021: 29.7 ML/MIN/1.73
EOSINOPHIL # BLD AUTO: 0.2 10*3/MM3 (ref 0–0.4)
EOSINOPHIL NFR BLD AUTO: 2.3 % (ref 0.3–6.2)
ERYTHROCYTE [DISTWIDTH] IN BLOOD BY AUTOMATED COUNT: 18.7 % (ref 12.3–15.4)
FERRITIN SERPL-MCNC: 53 NG/ML (ref 13–150)
FOLATE SERPL-MCNC: >20 NG/ML (ref 4.78–24.2)
GLUCOSE SERPL-MCNC: 88 MG/DL (ref 65–99)
HCT VFR BLD AUTO: 23 % (ref 34–46.6)
HCT VFR BLD AUTO: 27.9 % (ref 34–46.6)
HGB BLD-MCNC: 7.2 G/DL (ref 12–15.9)
HGB BLD-MCNC: 8.8 G/DL (ref 12–15.9)
IMM GRANULOCYTES # BLD AUTO: 0.04 10*3/MM3 (ref 0–0.05)
IMM GRANULOCYTES NFR BLD AUTO: 0.5 % (ref 0–0.5)
IRON 24H UR-MRATE: 42 MCG/DL (ref 37–145)
IRON SATN MFR SERPL: 12 % (ref 20–50)
LYMPHOCYTES # BLD AUTO: 2.07 10*3/MM3 (ref 0.7–3.1)
LYMPHOCYTES NFR BLD AUTO: 23.5 % (ref 19.6–45.3)
MCH RBC QN AUTO: 29.9 PG (ref 26.6–33)
MCHC RBC AUTO-ENTMCNC: 31.3 G/DL (ref 31.5–35.7)
MCV RBC AUTO: 95.4 FL (ref 79–97)
MONOCYTES # BLD AUTO: 0.8 10*3/MM3 (ref 0.1–0.9)
MONOCYTES NFR BLD AUTO: 9.1 % (ref 5–12)
NEUTROPHILS NFR BLD AUTO: 5.65 10*3/MM3 (ref 1.7–7)
NEUTROPHILS NFR BLD AUTO: 64.3 % (ref 42.7–76)
NRBC BLD AUTO-RTO: 0 /100 WBC (ref 0–0.2)
PLATELET # BLD AUTO: 172 10*3/MM3 (ref 140–450)
PMV BLD AUTO: 11.2 FL (ref 6–12)
POTASSIUM SERPL-SCNC: 3.7 MMOL/L (ref 3.5–5.2)
RBC # BLD AUTO: 2.41 10*6/MM3 (ref 3.77–5.28)
SODIUM SERPL-SCNC: 144 MMOL/L (ref 136–145)
TIBC SERPL-MCNC: 341 MCG/DL (ref 298–536)
UIBC SERPL-MCNC: 299 MCG/DL (ref 112–346)
UNIT  ABO: NORMAL
UNIT  ABO: NORMAL
UNIT  RH: NORMAL
UNIT  RH: NORMAL
VIT B12 BLD-MCNC: 328 PG/ML (ref 211–946)
WBC NRBC COR # BLD: 8.79 10*3/MM3 (ref 3.4–10.8)

## 2023-08-02 PROCEDURE — 86900 BLOOD TYPING SEROLOGIC ABO: CPT

## 2023-08-02 PROCEDURE — 85014 HEMATOCRIT: CPT | Performed by: INTERNAL MEDICINE

## 2023-08-02 PROCEDURE — P9016 RBC LEUKOCYTES REDUCED: HCPCS

## 2023-08-02 PROCEDURE — 82746 ASSAY OF FOLIC ACID SERUM: CPT | Performed by: STUDENT IN AN ORGANIZED HEALTH CARE EDUCATION/TRAINING PROGRAM

## 2023-08-02 PROCEDURE — 80048 BASIC METABOLIC PNL TOTAL CA: CPT | Performed by: INTERNAL MEDICINE

## 2023-08-02 PROCEDURE — 99232 SBSQ HOSP IP/OBS MODERATE 35: CPT | Performed by: INTERNAL MEDICINE

## 2023-08-02 PROCEDURE — 76775 US EXAM ABDO BACK WALL LIM: CPT

## 2023-08-02 PROCEDURE — 83540 ASSAY OF IRON: CPT | Performed by: STUDENT IN AN ORGANIZED HEALTH CARE EDUCATION/TRAINING PROGRAM

## 2023-08-02 PROCEDURE — 85018 HEMOGLOBIN: CPT | Performed by: INTERNAL MEDICINE

## 2023-08-02 PROCEDURE — 82607 VITAMIN B-12: CPT | Performed by: STUDENT IN AN ORGANIZED HEALTH CARE EDUCATION/TRAINING PROGRAM

## 2023-08-02 PROCEDURE — 85025 COMPLETE CBC W/AUTO DIFF WBC: CPT | Performed by: INTERNAL MEDICINE

## 2023-08-02 PROCEDURE — 99222 1ST HOSP IP/OBS MODERATE 55: CPT | Performed by: STUDENT IN AN ORGANIZED HEALTH CARE EDUCATION/TRAINING PROGRAM

## 2023-08-02 PROCEDURE — 94799 UNLISTED PULMONARY SVC/PX: CPT

## 2023-08-02 PROCEDURE — 36430 TRANSFUSION BLD/BLD COMPNT: CPT

## 2023-08-02 PROCEDURE — 83550 IRON BINDING TEST: CPT | Performed by: STUDENT IN AN ORGANIZED HEALTH CARE EDUCATION/TRAINING PROGRAM

## 2023-08-02 PROCEDURE — 82728 ASSAY OF FERRITIN: CPT | Performed by: STUDENT IN AN ORGANIZED HEALTH CARE EDUCATION/TRAINING PROGRAM

## 2023-08-02 RX ORDER — PANTOPRAZOLE SODIUM 40 MG/1
40 TABLET, DELAYED RELEASE ORAL
Status: DISCONTINUED | OUTPATIENT
Start: 2023-08-02 | End: 2023-08-04 | Stop reason: HOSPADM

## 2023-08-02 RX ORDER — SODIUM CHLORIDE 9 MG/ML
INJECTION, SOLUTION INTRAVENOUS
Status: DISPENSED
Start: 2023-08-02 | End: 2023-08-02

## 2023-08-02 RX ORDER — SODIUM CHLORIDE 9 MG/ML
INJECTION, SOLUTION INTRAVENOUS
Status: COMPLETED
Start: 2023-08-02 | End: 2023-08-02

## 2023-08-02 RX ADMIN — CHOLECALCIFEROL (VITAMIN D3) 10 MCG (400 UNIT) TABLET 400 UNITS: at 20:27

## 2023-08-02 RX ADMIN — Medication 400 MG: at 09:29

## 2023-08-02 RX ADMIN — GABAPENTIN 100 MG: 100 CAPSULE ORAL at 09:29

## 2023-08-02 RX ADMIN — METOPROLOL TARTRATE 12.5 MG: 25 TABLET, FILM COATED ORAL at 20:27

## 2023-08-02 RX ADMIN — Medication 10 ML: at 09:30

## 2023-08-02 RX ADMIN — Medication 10 ML: at 20:30

## 2023-08-02 RX ADMIN — METOPROLOL TARTRATE 12.5 MG: 25 TABLET, FILM COATED ORAL at 09:29

## 2023-08-02 RX ADMIN — ACETAMINOPHEN 650 MG: 325 TABLET ORAL at 20:33

## 2023-08-02 RX ADMIN — PANTOPRAZOLE SODIUM 40 MG: 40 TABLET, DELAYED RELEASE ORAL at 17:08

## 2023-08-02 RX ADMIN — AMLODIPINE BESYLATE 5 MG: 5 TABLET ORAL at 09:29

## 2023-08-02 RX ADMIN — CHOLECALCIFEROL (VITAMIN D3) 10 MCG (400 UNIT) TABLET 400 UNITS: at 09:29

## 2023-08-02 RX ADMIN — PANTOPRAZOLE SODIUM 40 MG: 40 TABLET, DELAYED RELEASE ORAL at 09:28

## 2023-08-02 RX ADMIN — MIRTAZAPINE 15 MG: 15 TABLET, FILM COATED ORAL at 20:27

## 2023-08-02 RX ADMIN — FERROUS SULFATE TAB EC 324 MG (65 MG FE EQUIVALENT) 324 MG: 324 (65 FE) TABLET DELAYED RESPONSE at 09:28

## 2023-08-02 RX ADMIN — GABAPENTIN 100 MG: 100 CAPSULE ORAL at 20:27

## 2023-08-02 RX ADMIN — AMIODARONE HYDROCHLORIDE 200 MG: 200 TABLET ORAL at 09:30

## 2023-08-02 RX ADMIN — PRAVASTATIN SODIUM 80 MG: 20 TABLET ORAL at 20:27

## 2023-08-02 RX ADMIN — SODIUM CHLORIDE 20 ML: 9 INJECTION, SOLUTION INTRAVENOUS at 09:51

## 2023-08-02 RX ADMIN — SENNOSIDES AND DOCUSATE SODIUM 2 TABLET: 50; 8.6 TABLET ORAL at 09:28

## 2023-08-02 NOTE — H&P (VIEW-ONLY)
Referring Provider: Dr. Newman   Reason for Consultation: Acute on Chronic Anemia/Symptomatic Anemia     Patient Care Team:  Jimena Aj MD as PCP - General (Family Medicine)    Chief complaint Worsening fatigue    Subjective .     History of present illness:      89 yo F with PMH of AVMs of colon s/p APC, CAD, HFpEF, PAD, A-Fib, JHOANA who initially presented on 8/1 with exertional dyspnea ongoing since 7/31. Admitted for symptomatic anemia with admission Hgb of 6.1, baseline Hgb ~8. GI consulted for further evaluation.     Reports her most recent BM last night was black which she attributes to the PO iron she takes. Denies NSAID use. Denies abdominal pain. Has been transfused 3 u pRBC since admission with most recent Hgb of 8.8 from 6.1. Iron studies c/w JHOANA -- T Sat 12% & Ferritin 53.     Of note, recently hospitalized in 6/2023 for similar presentation. Underwent double during her hospitalization. EGD showed gastritis and esophagitis. Colonoscopy showed two small AVMs in cecum and ascending colon s/p APC and multiple sub-cm polyps s/p removal. Xarelto was held indefinitely at discharge.     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     On anticoagulant therapy     Osteoarthritis     Polycythemia     PVD (peripheral vascular disease)    ,   Past Surgical History:   Procedure Laterality Date    APPENDECTOMY      CAROTID ENDARTERECTOMY Left     COLONOSCOPY N/A 03/03/2018    Procedure:  Colonoscopy to Terminal ileum with APC treatment for 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 and cold biopsy;  Surgeon: Terrie Carroll MD;  Location: [facility] ENDOSCOPY;   Service:     COLONOSCOPY W/ POLYPECTOMY N/A 6/21/2023    Procedure: COLONOSCOPY WITH POLYPECTOMY AND APC;  Surgeon: Emiliano Rodriguez MD;  Location: [facility] OR;  Service: Gastroenterology;  Laterality: N/A;  transverse polyp-forcep  cecal time 1659  APC  DESCENDING POLYP  SIGMOID POLYP    ENDOSCOPY N/A 03/03/2018    Procedure: EGD with biopsy;  Surgeon: Terrie Carroll MD;  Location: [facility] ENDOSCOPY;  Service:     ENDOSCOPY N/A 6/19/2023    Procedure: ESOPHAGOGASTRODUODENOSCOPY;  Surgeon: Emiliano Rodriguez MD;  Location: [facility] OR;  Service: Gastroenterology;  Laterality: N/A;  Reflux, Gastritis    KNEE ARTHROSCOPY Right    , History reviewed. No pertinent family history.,   Social History     Socioeconomic History    Marital status: Single   Tobacco Use    Smoking status: Never    Smokeless tobacco: Never   Vaping Use    Vaping Use: Never used   Substance and Sexual Activity    Alcohol use: No    Drug use: No    Sexual activity: Never     E-cigarette/Vaping    E-cigarette/Vaping Use Never User     Passive Exposure No     Counseling Given Yes      E-cigarette/Vaping Substances    Nicotine No     THC No     CBD No     Flavoring No      E-cigarette/Vaping Devices    Disposable No     Pre-filled or Refillable Cartridge No     Refillable Tank No     Pre-filled Pod No          ,   Medications Prior to Admission   Medication Sig Dispense Refill Last Dose    acetaminophen (TYLENOL) 325 MG tablet Take 2 tablets by mouth Every 4 (Four) Hours As Needed for Mild Pain . (Patient taking differently: Take 500 mg by mouth Every 4 (Four) Hours As Needed for Mild Pain.)   Past Week    alendronate (FOSAMAX) 70 MG tablet Take 1 tablet by mouth Every 7 (Seven) Days.   8/1/2023    amiodarone (PACERONE) 200 MG tablet Take 1 tablet by mouth Daily.   8/1/2023    amLODIPine (NORVASC) 5 MG tablet Take 1 tablet by mouth Daily.   8/1/2023    bisacodyl (DULCOLAX) 5 MG EC tablet Take 1 tablet by mouth Daily As Needed for  Constipation.   8/1/2023    Calcium Carb-Cholecalciferol (OYSTER SHELL CALCIUM/VITAMIN D) 250-125 MG-UNIT tablet tablet Take 2 tablets by mouth 2 (Two) Times a Day.   8/1/2023    cholecalciferol (VITAMIN D3) 400 units tablet Take 1 tablet by mouth 2 (Two) Times a Day.   8/1/2023    docusate sodium (COLACE) 100 MG capsule Take 1 capsule by mouth 2 (Two) Times a Day As Needed.   8/1/2023    ferrous sulfate 325 (65 FE) MG tablet Take 1 tablet by mouth Daily With Breakfast.   8/1/2023    gabapentin (NEURONTIN) 100 MG capsule Take 1 capsule by mouth 2 (Two) Times a Day.   8/1/2023    Magnesium Oxide -Mg Supplement 400 (240 Mg) MG tablet Take 1 tablet by mouth Daily.   8/1/2023    metoprolol tartrate (LOPRESSOR) 25 MG tablet Take 0.5 tablets by mouth Every 12 (Twelve) Hours. 15 tablet 1 8/1/2023    mirtazapine (REMERON) 15 MG tablet Take 1 tablet by mouth Every Night.   7/31/2023    pantoprazole (Protonix) 40 MG EC tablet Take 1 tablet by mouth Daily. 30 tablet 1 8/1/2023    pravastatin (PRAVACHOL) 80 MG tablet Take 1 tablet by mouth Daily.   8/1/2023   , Scheduled Meds:  amiodarone, 200 mg, Oral, Daily  amLODIPine, 5 mg, Oral, Daily  cholecalciferol, 400 Units, Oral, BID  ferrous sulfate, 324 mg, Oral, Daily With Breakfast  gabapentin, 100 mg, Oral, BID  magnesium oxide, 400 mg, Oral, Daily  metoprolol tartrate, 12.5 mg, Oral, Q12H  mirtazapine, 15 mg, Oral, Nightly  pantoprazole, 40 mg, Oral, BID AC  pravastatin, 80 mg, Oral, Nightly  senna-docusate sodium, 2 tablet, Oral, BID  sodium chloride, 10 mL, Intravenous, Q12H   , Continuous Infusions:   , PRN Meds:    acetaminophen    senna-docusate sodium **AND** polyethylene glycol **AND** bisacodyl **AND** bisacodyl    Calcium Replacement - Follow Nurse / BPA Driven Protocol    docusate sodium    Magnesium Low Dose Replacement - Follow Nurse / BPA Driven Protocol    Phosphorus Replacement - Follow Nurse / BPA Driven Protocol    Potassium Replacement - Follow Nurse / BPA  Driven Protocol    sodium chloride    sodium chloride    sodium chloride, and Allergies:  Clinoril [sulindac], Codeine, Coumadin [warfarin sodium], Ibuprofen-oxycodone [oxycodone-ibuprofen], Keflex [cephalexin], Mydriacyl [tropicamide], Don-synephrine [phenylephrine], Penicillins, Phenylephrine hcl (pressors), Sulfa antibiotics, and Zantac [ranitidine hcl]    Objective     Vital Signs   Temp:  [97 øF (36.1 øC)-98.5 øF (36.9 øC)] 98.2 øF (36.8 øC)  Heart Rate:  [66-78] 68  Resp:  [16-20] 18  BP: (104-133)/(52-69) 108/53    Physical Exam:   Head: normocephalic, without obvious abnormality and atraumatic  Eyes: lids and lashes normal, conjunctivae and sclerae normal, no icterus, no pallor, corneas clear, and PERRLA  Lungs: clear to auscultation, respirations regular, respirations even, and respirations unlabored  Heart: regular rhythm & normal rate, normal S1, S2, no murmur, no gallop, no rub, and no click  Abdomen: normal bowel sounds, no masses, soft non-tender, no guarding, and no rebound tenderness    Results Review:   I reviewed the patient's new clinical results.      Assessment & Plan       Acute on chronic anemia    89 yo F with PMH of AVMs of colon s/p APC, CAD, HFpEF, PAD, A-Fib, JHOANA who initially presented on 8/1 with exertional dyspnea ongoing since 7/31. Admitted for symptomatic anemia with admission Hgb of 6.1, baseline Hgb ~8. GI consulted for further evaluation.     # Acute on Chronic Anemia  # Symptomatic Anemia   # Iron Deficiency Anemia   # Melena, suspected   - Endorses black colored stools which she attributes to PO iron   - Appropriate rise in Hgb following 3 pRBC transfusions since admission   - EGD in 6/2023: gastritis and esophagitis.   - Colonoscopy in 6/2023: two small AVMs in cecum and ascending colon s/p APC and multiple sub-cm polyps s/p removal.   - Admission iron studies: T Sat 12% c/w JHOANA   Plan:   - NPO at [place]. If Hgb drops or has overt bleeding, will plan for push enteroscopy tm (8/3)  to further assess given history of AVMs   - Agree with PO Protonix 40 mg BID   - Consider IV iron while inpatient given iron studies c/w persistent JHOANA     I discussed the patients findings and my recommendations with patient and family    Vadim Carr MD  08/02/23  16:36 EDT

## 2023-08-02 NOTE — CASE MANAGEMENT/SOCIAL WORK
Discharge Planning Assessment  KALA Lu     Patient Name: Aria Fernando  MRN: [id number]  Today's Date: 8/2/2023    Admit Date: 8/1/2023    Plan: Return back to [facility] with [facility]   Discharge Needs Assessment       Row Name 08/02/23 1220       Living Environment    People in Home alone    Current Living Arrangements assisted living facility  Resident at [facility] in [place]    Duration at Residence 4 Y    Potentially Unsafe Housing Conditions none    Primary Care Provided by self;other (see comments)  has help with bathing, cleaning, meals at facility    Provides Primary Care For no one    Caregiving Concerns no care giving concerns voiced by patient at this time.    Family Caregiver if Needed child(rancho), adult    Family Caregiver Names Ina Goodman, daughter    Quality of Family Relationships unable to assess    Able to Return to Prior Arrangements yes    Living Arrangement Comments Patient states she lives in a first floor apartment at [facility] in [place] and has no steps to gain entry       Resource/Environmental Concerns    Resource/Environmental Concerns none    Transportation Concerns none       Food Insecurity    Within the past 12 months, you worried that your food would run out before you got the money to buy more. Never true    Within the past 12 months, the food you bought just didn't last and you didn't have money to get more. Never true       Transition Planning    Patient/Family Anticipates Transition to home    Patient/Family Anticipated Services at Transition none    Transportation Anticipated family or friend will provide  pt states her daughter will be able to provide ride home at discharge       Discharge Needs Assessment    Readmission Within the Last 30 Days no previous admission in last 30 days    Current Outpatient/Agency/Support Group homecare agency    Equipment Currently Used at Home grab bar;shower  chair;wheelchair;rollator    Concerns to be Addressed adjustment to diagnosis/illness;discharge planning    Concerns Comments pt will need continued services through  at discharge    Anticipated Changes Related to Illness none    Equipment Needed After Discharge none    Outpatient/Agency/Support Group Needs homecare agency    Discharge Facility/Level of Care Needs home with home health    Provided Post Acute Provider List? Refused    Refused Provider List Comment pt declined    Patient's Choice of Community Agency(s) [facility]    Current Discharge Risk --  none    Discharge Coordination/Progress Patient states she plans on returning back to her assisted living apartment at discharge with continued services through [facility].                   Discharge Plan       Row Name 08/02/23 1229       Plan    Plan Return back to [facility] with [facility]    Patient/Family in Agreement with Plan yes    Plan Comments 0945am, Into room and introduced self and role of CM. Discussed discharge disposition with patient at bedside. Patient is currently resting in bed and voiced no complaints. Patient confirms that the info on her face sheet is correct and the address listed is her daughter's where her mail goes, however, she currently lives in [place] at [facility] living [facility]. She states she see's Dr. Jimena Aj as PCP. She states she uses [facility] in [place] and her daughter picks up and pays for her med's. She also states that she does have a living will and POA paperwork and it is on file here. Patient states she lives alone in a first floor apartment with no steps to gain entry and normally has no issues entering the home or maneuvering inside using a rollator. She states she is independent with most of her ADL's, however, the facility helps with showering, meals, laundry and cleaning. She states she no longer drives and her daughter provides her transportation and will  be able to provide ride home at discharge. She also states she has grab bars, shower chair, rollator and wheelchair at home and does not anticipate needing any other equipment at discharge. Patient states she is current with [facility] and will continue this service at discharge and CM left a  for [facility] liaison of patient's admission. Patient declines the need for other services such as STR or LTC at this time. Patient states she plans on returning home at discharge with her daughter and the facility to help as needed and voiced no discharge needs at this time. She had no other questions or concerns regarding discharge plans. Name and number placed on white board in room. CM will follow.                  Continued Care and Services - Admitted Since 8/1/2023    Coordination has not been started for this encounter.       Selected Continued Care - Prior Encounters Includes continued care and service providers with selected services from prior encounters from 5/3/2023 to 8/2/2023      Discharged on 6/22/2023 Admission date: 6/18/2023 - Discharge disposition: Home-Health Care c      Home Medical Care       Service Provider Selected Services Address Phone Fax Patient Preferred    [facility] [address] [place] [id number] [phone] --                             Demographic Summary       Row Name 08/02/23 1220       General Information    Admission Type inpatient    Arrived From home    Required Notices Provided Important Message from Medicare    Referral Source admission list    Reason for Consult discharge planning    Preferred Language English       Contact Information    Permission Granted to Share Info With                    Functional Status    No documentation.                  Psychosocial    No documentation.                  Abuse/Neglect    No documentation.                  Legal    No documentation.                   Substance Abuse    No documentation.                  Patient Forms    No documentation.                     Lucy Barry RN

## 2023-08-02 NOTE — PLAN OF CARE
Goal Outcome Evaluation:  Plan of Care Reviewed With: patient        Progress: improving  Outcome Evaluation: VS stable, 1 Liter NC, 2 units PRBCs infused this shift, Hgb increased to 7.2 this morning. IV saline locked.

## 2023-08-02 NOTE — PROGRESS NOTES
Renal US shows no evidence of hemorrhagic cyst. Given elevated BUN, Hgb not improving to expected range with 2 units PRBC's, I've cancelled the Urology consult and instead have placed GI consult.

## 2023-08-02 NOTE — CONSULTS
Referring Provider: Dr. Newman   Reason for Consultation: Acute on Chronic Anemia/Symptomatic Anemia     Patient Care Team:  Jimena Aj MD as PCP - General (Family Medicine)    Chief complaint Worsening fatigue    Subjective .     History of present illness:      89 yo F with PMH of AVMs of colon s/p APC, CAD, HFpEF, PAD, A-Fib, JHOANA who initially presented on 8/1 with exertional dyspnea ongoing since 7/31. Admitted for symptomatic anemia with admission Hgb of 6.1, baseline Hgb ~8. GI consulted for further evaluation.     Reports her most recent BM last night was black which she attributes to the PO iron she takes. Denies NSAID use. Denies abdominal pain. Has been transfused 3 u pRBC since admission with most recent Hgb of 8.8 from 6.1. Iron studies c/w JHOANA -- T Sat 12% & Ferritin 53.     Of note, recently hospitalized in 6/2023 for similar presentation. Underwent double during her hospitalization. EGD showed gastritis and esophagitis. Colonoscopy showed two small AVMs in cecum and ascending colon s/p APC and multiple sub-cm polyps s/p removal. Xarelto was held indefinitely at discharge.     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     On anticoagulant therapy     Osteoarthritis     Polycythemia     PVD (peripheral vascular disease)    ,   Past Surgical History:   Procedure Laterality Date    APPENDECTOMY      CAROTID ENDARTERECTOMY Left     COLONOSCOPY N/A 03/03/2018    Procedure:  Colonoscopy to Terminal ileum with APC treatment for 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 and cold biopsy;  Surgeon: Terrie Carroll MD;  Location: [facility] ENDOSCOPY;   Service:     COLONOSCOPY W/ POLYPECTOMY N/A 6/21/2023    Procedure: COLONOSCOPY WITH POLYPECTOMY AND APC;  Surgeon: Emiliano Rodriguez MD;  Location: [facility] OR;  Service: Gastroenterology;  Laterality: N/A;  transverse polyp-forcep  cecal time 1659  APC  DESCENDING POLYP  SIGMOID POLYP    ENDOSCOPY N/A 03/03/2018    Procedure: EGD with biopsy;  Surgeon: Terrie Carroll MD;  Location: [facility] ENDOSCOPY;  Service:     ENDOSCOPY N/A 6/19/2023    Procedure: ESOPHAGOGASTRODUODENOSCOPY;  Surgeon: Emiliano Rodriguez MD;  Location: [facility] OR;  Service: Gastroenterology;  Laterality: N/A;  Reflux, Gastritis    KNEE ARTHROSCOPY Right    , History reviewed. No pertinent family history.,   Social History     Socioeconomic History    Marital status: Single   Tobacco Use    Smoking status: Never    Smokeless tobacco: Never   Vaping Use    Vaping Use: Never used   Substance and Sexual Activity    Alcohol use: No    Drug use: No    Sexual activity: Never     E-cigarette/Vaping    E-cigarette/Vaping Use Never User     Passive Exposure No     Counseling Given Yes      E-cigarette/Vaping Substances    Nicotine No     THC No     CBD No     Flavoring No      E-cigarette/Vaping Devices    Disposable No     Pre-filled or Refillable Cartridge No     Refillable Tank No     Pre-filled Pod No          ,   Medications Prior to Admission   Medication Sig Dispense Refill Last Dose    acetaminophen (TYLENOL) 325 MG tablet Take 2 tablets by mouth Every 4 (Four) Hours As Needed for Mild Pain . (Patient taking differently: Take 500 mg by mouth Every 4 (Four) Hours As Needed for Mild Pain.)   Past Week    alendronate (FOSAMAX) 70 MG tablet Take 1 tablet by mouth Every 7 (Seven) Days.   8/1/2023    amiodarone (PACERONE) 200 MG tablet Take 1 tablet by mouth Daily.   8/1/2023    amLODIPine (NORVASC) 5 MG tablet Take 1 tablet by mouth Daily.   8/1/2023    bisacodyl (DULCOLAX) 5 MG EC tablet Take 1 tablet by mouth Daily As Needed for  Constipation.   8/1/2023    Calcium Carb-Cholecalciferol (OYSTER SHELL CALCIUM/VITAMIN D) 250-125 MG-UNIT tablet tablet Take 2 tablets by mouth 2 (Two) Times a Day.   8/1/2023    cholecalciferol (VITAMIN D3) 400 units tablet Take 1 tablet by mouth 2 (Two) Times a Day.   8/1/2023    docusate sodium (COLACE) 100 MG capsule Take 1 capsule by mouth 2 (Two) Times a Day As Needed.   8/1/2023    ferrous sulfate 325 (65 FE) MG tablet Take 1 tablet by mouth Daily With Breakfast.   8/1/2023    gabapentin (NEURONTIN) 100 MG capsule Take 1 capsule by mouth 2 (Two) Times a Day.   8/1/2023    Magnesium Oxide -Mg Supplement 400 (240 Mg) MG tablet Take 1 tablet by mouth Daily.   8/1/2023    metoprolol tartrate (LOPRESSOR) 25 MG tablet Take 0.5 tablets by mouth Every 12 (Twelve) Hours. 15 tablet 1 8/1/2023    mirtazapine (REMERON) 15 MG tablet Take 1 tablet by mouth Every Night.   7/31/2023    pantoprazole (Protonix) 40 MG EC tablet Take 1 tablet by mouth Daily. 30 tablet 1 8/1/2023    pravastatin (PRAVACHOL) 80 MG tablet Take 1 tablet by mouth Daily.   8/1/2023   , Scheduled Meds:  amiodarone, 200 mg, Oral, Daily  amLODIPine, 5 mg, Oral, Daily  cholecalciferol, 400 Units, Oral, BID  ferrous sulfate, 324 mg, Oral, Daily With Breakfast  gabapentin, 100 mg, Oral, BID  magnesium oxide, 400 mg, Oral, Daily  metoprolol tartrate, 12.5 mg, Oral, Q12H  mirtazapine, 15 mg, Oral, Nightly  pantoprazole, 40 mg, Oral, BID AC  pravastatin, 80 mg, Oral, Nightly  senna-docusate sodium, 2 tablet, Oral, BID  sodium chloride, 10 mL, Intravenous, Q12H   , Continuous Infusions:   , PRN Meds:    acetaminophen    senna-docusate sodium **AND** polyethylene glycol **AND** bisacodyl **AND** bisacodyl    Calcium Replacement - Follow Nurse / BPA Driven Protocol    docusate sodium    Magnesium Low Dose Replacement - Follow Nurse / BPA Driven Protocol    Phosphorus Replacement - Follow Nurse / BPA Driven Protocol    Potassium Replacement - Follow Nurse / BPA  Driven Protocol    sodium chloride    sodium chloride    sodium chloride, and Allergies:  Clinoril [sulindac], Codeine, Coumadin [warfarin sodium], Ibuprofen-oxycodone [oxycodone-ibuprofen], Keflex [cephalexin], Mydriacyl [tropicamide], Don-synephrine [phenylephrine], Penicillins, Phenylephrine hcl (pressors), Sulfa antibiotics, and Zantac [ranitidine hcl]    Objective     Vital Signs   Temp:  [97 øF (36.1 øC)-98.5 øF (36.9 øC)] 98.2 øF (36.8 øC)  Heart Rate:  [66-78] 68  Resp:  [16-20] 18  BP: (104-133)/(52-69) 108/53    Physical Exam:   Head: normocephalic, without obvious abnormality and atraumatic  Eyes: lids and lashes normal, conjunctivae and sclerae normal, no icterus, no pallor, corneas clear, and PERRLA  Lungs: clear to auscultation, respirations regular, respirations even, and respirations unlabored  Heart: regular rhythm & normal rate, normal S1, S2, no murmur, no gallop, no rub, and no click  Abdomen: normal bowel sounds, no masses, soft non-tender, no guarding, and no rebound tenderness    Results Review:   I reviewed the patient's new clinical results.      Assessment & Plan       Acute on chronic anemia    89 yo F with PMH of AVMs of colon s/p APC, CAD, HFpEF, PAD, A-Fib, JHOANA who initially presented on 8/1 with exertional dyspnea ongoing since 7/31. Admitted for symptomatic anemia with admission Hgb of 6.1, baseline Hgb ~8. GI consulted for further evaluation.     # Acute on Chronic Anemia  # Symptomatic Anemia   # Iron Deficiency Anemia   # Melena, suspected   - Endorses black colored stools which she attributes to PO iron   - Appropriate rise in Hgb following 3 pRBC transfusions since admission   - EGD in 6/2023: gastritis and esophagitis.   - Colonoscopy in 6/2023: two small AVMs in cecum and ascending colon s/p APC and multiple sub-cm polyps s/p removal.   - Admission iron studies: T Sat 12% c/w JHOANA   Plan:   - NPO at [facility]. If Hgb drops or has overt bleeding, will plan for push enteroscopy tm (8/3)  to further assess given history of AVMs   - Agree with PO Protonix 40 mg BID   - Consider IV iron while inpatient given iron studies c/w persistent JHOANA     I discussed the patients findings and my recommendations with patient and family    Vadim Carr MD  08/02/23  16:36 EDT

## 2023-08-02 NOTE — PAYOR COMM NOTE
"Aria Fernando \"Osage\" (89 y.o. Female)     ATTN: NURSE REVIEWER  RE: INPATIENT AUTH REQUEST  REF#[id number]  PLS REPLY TO TEOFILO MURPHY [phone] FAX# [phone]           Date of Birth   09/27/1930    Social Security Number       Address   [address] [place] [id number]    Home Phone   [phone]    MRN   [id number]       [facility]    Marital Status   Single                            Admission Date   8/1/23    Admission Type   Emergency    Admitting Provider   Alberto Newman DO    Attending Provider   Alberto Newman DO    Department, Room/Bed   [facility] SURG, 1408/1       Discharge Date       Discharge Disposition       Discharge Destination                                 Attending Provider: Alberto Newman DO    Allergies: Clinoril [Sulindac], Codeine, Coumadin [Warfarin Sodium], Ibuprofen-oxycodone [Oxycodone-ibuprofen], Keflex [Cephalexin], Mydriacyl [Tropicamide], Don-synephrine [Phenylephrine], Penicillins, Phenylephrine Hcl (Pressors), Sulfa Antibiotics, Zantac [Ranitidine Hcl]    Isolation: None   Infection: None   Code Status: CPR    Ht: 157.5 cm (62\")   Wt: 82.6 kg (182 lb)    Admission Cmt: None   Principal Problem: Acute on chronic anemia [D64.9]                   Active Insurance as of 8/1/2023       Primary Coverage       Payor Plan Insurance Group Employer/Plan Group    [facility] MEDICARE REPLACEMENT [employer] MEDICARE REPLACEMENT        Payor Plan Address Payor Plan Phone Number Payor Plan Fax Number Effective Dates    [address] [address] [phone]  5/25/2022 - None Entered    [facility] [address]         Subscriber Name Subscriber Birth Date Member ID       ARIA FERNANDO 9/27/1930 [id number]               Secondary Coverage       Payor Plan Insurance Group Employer/Plan Group    [place]        Payor Plan Address Payor Plan Phone Number Payor Plan Fax Number Effective Dates    [address] " [phone]  5/1/2022 - None Entered    [place] [address]         Subscriber Name Subscriber Birth Date Member ID       VETO CONNELL 9/27/1930 [id number]                     Emergency Contacts        (Rel.) Home Phone Work Phone Mobile Phone    Ina Goodman (Power of ) -- -- [phone]                 History & Physical        Alberto Newman DO at 08/01/23 1553          [facility] HOSPITAL[facility]     PCP: Jimena Aj MD    CODE status: Full    CHIEF COMPLAINT: Generalized weakness    HISTORY OF PRESENT ILLNESS:    Patient is a 89-year-old female with past medical history significant for previous GI bleeds for which she is well-known by myself and facility, as well as congestive heart failure, coronary artery disease, GERD, hyperlipidemia, hypertension, 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  She presents to [facility] ED complaining of generalized weakness since yesterday.  Her symptoms worsen with any type of walking, and she becomes short of breath.  She denies chest pain abdominal pain vomiting nausea or diarrhea.  She reports no blood in her stool.  Family reported in ED this is similar to past presentations where she has had GI bleed.  They have not been able to identify any source of bleeding at this time.     Work-up in the ED revealed patient have hemoglobin 6.1, BUN elevated at 52, no active bleeding seen in ED. CT abdomen and pelvis showed possible hemorrhagic cyst of the kidney.       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     On anticoagulant therapy     Osteoarthritis     Polycythemia     PVD (peripheral vascular disease)       Past Surgical History:   Procedure Laterality Date    APPENDECTOMY      CAROTID ENDARTERECTOMY Left     COLONOSCOPY N/A 03/03/2018    Procedure:  Colonoscopy to Terminal ileum with APC treatment for 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 and cold biopsy;  Surgeon: Terrie Carroll MD;  Location:  [facility];  Service:     COLONOSCOPY W/ POLYPECTOMY N/A 6/21/2023    Procedure: COLONOSCOPY WITH POLYPECTOMY AND APC;  Surgeon: Emiliano Rodriguez MD;  Location:  [facility];  Service: Gastroenterology;  Laterality: N/A;  transverse polyp-forcep  cecal time 1659  APC  DESCENDING POLYP  SIGMOID POLYP    ENDOSCOPY N/A 03/03/2018    Procedure: EGD with biopsy;  Surgeon: Terrie Carroll MD;  Location:  [facility];  Service:     ENDOSCOPY N/A 6/19/2023    Procedure: ESOPHAGOGASTRODUODENOSCOPY;  Surgeon: Emiliano Rodriguez MD;  Location:  [facility];  Service: Gastroenterology;  Laterality: N/A;  Reflux, Gastritis    KNEE ARTHROSCOPY Right      No family history on file.  Social History     Tobacco Use    Smoking status: Never    Smokeless tobacco: Never   Vaping Use    Vaping Use: Never used   Substance Use Topics    Alcohol use: No    Drug use: No     (Not in a hospital admission)    Allergies:  Clinoril [sulindac], Codeine, Coumadin [warfarin sodium], Ibuprofen-oxycodone [oxycodone-ibuprofen], Keflex [cephalexin], Mydriacyl [tropicamide], Don-synephrine [phenylephrine], Penicillins, Phenylephrine hcl (pressors), Sulfa antibiotics, and Zantac [ranitidine hcl]    Immunization History   Administered Date(s) Administered    COVID-19 (PFIZER) Purple Cap Monovalent 01/06/2021, 01/27/2021, 12/09/2021, 07/14/2022    Influenza Seasonal Injectable 11/01/2018    Influenza, Unspecified 10/31/2017    Tdap 11/11/2021       REVIEW OF SYSTEMS:  Please see the above history of present illness for pertinent positives and negatives.  The remainder of the patient's systems have been reviewed and are negative.     Vital Signs  Temp:   "[97.8 øF (36.6 øC)] 97.8 øF (36.6 øC)  Heart Rate:  [68-86] 68  Resp:  [18] 18  BP: (100-129)/(52-94) 101/52  Flowsheet Rows      Flowsheet Row First Filed Value   Admission Height 154.9 cm (61\") Documented at 08/01/2023 1305   Admission Weight 82.8 kg (182 lb 8 oz) Documented at 08/01/2023 1305               Physical Exam:  General: Patient is awake and alert.  Pale appearing elderly female. No acute distress noted.   HENT: Head is atraumatic, normocephalic. Hearing is grossly intact. Nose is without obvious congestion and appears patent. Neck is supple and trachea is midline.   Eyes: Vision is grossly intact. Pupils appear equal and round.   Cardiovascular: Heart has regular rate and rhythm with no murmurs, rubs or gallops noted.   Respiratory: Lungs are clear to ausculation without wheezes, rhonchi or rales.   Abdominal/GI: Soft, nontender, bowel sounds present. No rebound or guarding present.   Extremities: No peripheral edema noted.   Musculoskeletal: Spontaneous movement of bilateral upper and lower extremities against gravity noted. No signs of injury or deformity noted.   Skin: Warm and dry.   Psych: Mood and affect are appropriate. Cooperative with exam.   Neuro: No facial asymmetry noted. No focal deficits noted, hearing and vision are grossly intact.    Emotional Behavior: all of the following were found to be normal   Judgment and Insight   Mental Status   Memory   Mood and Affect: neither of the following found acutely        Depression                 Anxiety     Debilities: no signs of the following found    Physical Weakness     Handicaps     Disabilities     Agitation         Results Review:    I reviewed the patient's new clinical results.  Lab Results (most recent)       Procedure Component Value Units Date/Time    Urinalysis, Microscopic Only - Urine, Clean Catch [[id number]]  (Abnormal) Collected: 08/01/23 1443    Specimen: Urine, Clean Catch Updated: 08/01/23 1522     RBC, UA 3-5 /HPF      WBC, " UA 6-12 /HPF      Bacteria, UA 4+ /HPF      Squamous Epithelial Cells, UA 0-2 /HPF      Hyaline Casts, UA None Seen /LPF      Methodology Manual Light Microscopy    Single High Sensitivity Troponin T [[id number]]  (Abnormal) Collected: 08/01/23 1446    Specimen: Blood from Arm, Right Updated: 08/01/23 1507     HS Troponin T 28 ng/L     Narrative:      High Sensitive Troponin T Reference Range:  <10.0 ng/L- Negative Female for AMI  <15.0 ng/L- Negative Male for AMI  >=10 - Abnormal Female indicating possible myocardial injury.  >=15 - Abnormal Male indicating possible myocardial injury.   Clinicians would have to utilize clinical acumen, EKG, Troponin, and serial changes to determine if it is an Acute Myocardial Infarction or myocardial injury due to an underlying chronic condition.         Urinalysis With Microscopic If Indicated (No Culture) - Urine, Clean Catch [[id number]]  (Abnormal) Collected: 08/01/23 1443    Specimen: Urine, Clean Catch Updated: 08/01/23 1457     Color, UA Yellow     Appearance, UA Clear     pH, UA 6.0     Specific Gravity, UA 1.015     Glucose, UA Negative     Ketones, UA Negative     Bilirubin, UA Negative     Blood, UA Small (1+)     Protein, UA Negative     Leuk Esterase, UA Small (1+)     Nitrite, UA Negative     Urobilinogen, UA 0.2 E.U./dL    [place] Draw [[id number]] Collected: 08/01/23 1315    Specimen: Blood Updated: 08/01/23 1431    Narrative:      The following orders were created for panel order [place] Draw.  Procedure                               Abnormality         Status                     ---------                               -----------         ------                     Green Top (Gel)[[id number]]                                  Final result               Lavender Top[[id number]]                                     Final result               Gold Top - SST[[id number]]                                                              Light Blue Top[[id number]]                                                                 Please view results for these tests on the individual orders.    Green Top (Gel) [[id number]] Collected: 08/01/23 1315    Specimen: Blood Updated: 08/01/23 1431     Extra Tube Hold for add-ons.     Comment: Auto resulted.       Lavender Top [[id number]] Collected: 08/01/23 1315    Specimen: Blood Updated: 08/01/23 1431     Extra Tube hold for add-on     Comment: Auto resulted       COVID-19 and FLU A/B PCR - Swab, Nasopharynx [[id number]]  (Normal) Collected: 08/01/23 1340    Specimen: Swab from Nasopharynx Updated: 08/01/23 1403     COVID19 Not Detected     Influenza A PCR Not Detected     Influenza B PCR Not Detected    Narrative:      Fact sheet for providers: https://www.fda.gov/media/049965/download    Fact sheet for patients: https://www.fda.gov/media/535893/download    Test performed by PCR.    Comprehensive Metabolic Panel [[id number]]  (Abnormal) Collected: 08/01/23 1315    Specimen: Blood Updated: 08/01/23 1341     Glucose 115 mg/dL      BUN 52 mg/dL      Creatinine 1.82 mg/dL      Sodium 144 mmol/L      Potassium 4.0 mmol/L      Chloride 105 mmol/L      CO2 27.3 mmol/L      Calcium 8.9 mg/dL      Total Protein 6.0 g/dL      Albumin 4.0 g/dL      ALT (SGPT) 8 U/L      AST (SGOT) 14 U/L      Alkaline Phosphatase 64 U/L      Total Bilirubin 0.3 mg/dL      Globulin 2.0 gm/dL      A/G Ratio 2.0 g/dL      BUN/Creatinine Ratio 28.6     Anion Gap 11.7 mmol/L      eGFR 25.8 mL/min/1.73     Narrative:      GFR Normal >60  Chronic Kidney Disease <60  Kidney Failure <15    The GFR formula is only valid for adults with stable renal function between ages 18 and 70.    Magnesium [[id number]]  (Abnormal) Collected: 08/01/23 1315    Specimen: Blood Updated: 08/01/23 1341     Magnesium 2.5 mg/dL     Single High Sensitivity Troponin T [[id number]]  (Abnormal) Collected: 08/01/23 1315    Specimen: Blood Updated: 08/01/23 1340     HS Troponin T 29 ng/L     Narrative:      High Sensitive  Troponin T Reference Range:  <10.0 ng/L- Negative Female for AMI  <15.0 ng/L- Negative Male for AMI  >=10 - Abnormal Female indicating possible myocardial injury.  >=15 - Abnormal Male indicating possible myocardial injury.   Clinicians would have to utilize clinical acumen, EKG, Troponin, and serial changes to determine if it is an Acute Myocardial Infarction or myocardial injury due to an underlying chronic condition.         CBC & Differential [[id number]]  (Abnormal) Collected: 08/01/23 1315    Specimen: Blood Updated: 08/01/23 1323    Narrative:      The following orders were created for panel order CBC & Differential.  Procedure                               Abnormality         Status                     ---------                               -----------         ------                     CBC Auto Differential[[id number]]        Abnormal            Final result                 Please view results for these tests on the individual orders.    CBC Auto Differential [[id number]]  (Abnormal) Collected: 08/01/23 1315    Specimen: Blood Updated: 08/01/23 1323     WBC 10.17 10*3/mm3      RBC 2.11 10*6/mm3      Hemoglobin 6.1 g/dL      Hematocrit 21.0 %      MCV 99.5 fL      MCH 28.9 pg      MCHC 29.0 g/dL      RDW 20.9 %      RDW-SD 73.9 fl      MPV 10.9 fL      Platelets 234 10*3/mm3      Neutrophil % 69.3 %      Lymphocyte % 19.3 %      Monocyte % 9.4 %      Eosinophil % 1.0 %      Basophil % 0.5 %      Immature Grans % 0.5 %      Neutrophils, Absolute 7.05 10*3/mm3      Lymphocytes, Absolute 1.96 10*3/mm3      Monocytes, Absolute 0.96 10*3/mm3      Eosinophils, Absolute 0.10 10*3/mm3      Basophils, Absolute 0.05 10*3/mm3      Immature Grans, Absolute 0.05 10*3/mm3      nRBC 0.0 /100 WBC             Imaging Results (Most Recent)       Procedure Component Value Units Date/Time    CT Abdomen Pelvis Without Contrast [[id number]] Collected: 08/01/23 1431     Updated: 08/01/23 1541    Narrative:      CT ABDOMEN AND PELVIS  WITHOUT CONTRAST 8/1/2023     HISTORY:  89-year-old female with generalized weakness and shortness of air. Low  hemoglobin and red blood cell count.     TECHNIQUE:   Noncontrast CT of the abdomen and pelvis was performed. Sagittal and  coronal reformats performed. No comparisons. Radiation dose reduction  techniques included automated exposure control or exposure modulation  based on body size. Radiation audit for CT and nuclear cardiology exams  in the last 12 months: 0.     ABDOMEN FINDINGS:   Included lung bases show areas of atelectasis. Old healed granulomatous  disease. The heart is enlarged. There is no effusion. Normal caliber  aorta with advanced atherosclerotic change. The spleen and adrenal  glands are negative and the pancreas is negative. Uncomplicated  cholelithiasis. Liver and spleen both show granulomatous changes. The  kidneys are nonobstructed. There is atrophy and cortical scarring  bilaterally. Probable faint renal vascular calcifications on the left.  No distinct ureteral stone. In the lateral midpole left kidney there is  a mildly hyperdense exophytic lesion probably representing a hemorrhagic  cyst and measuring 10 mm. This could be confirmed with nonemergent renal  ultrasound. There is no threshold adenopathy.     PELVIS FINDINGS:   Leiomyomatous change of the uterus. No drainable fluid collection.  Negative bladder. There is no bowel obstruction or focal inflammatory  change of the bowel. Negative terminal ileum. Appendix surgically absent  by history. Inguinal canals are negative. There are advanced  degenerative changes in the lower lumbar spine.  negative.       Impression:      1. No clearly acute process in the abdomen or pelvis. No bowel  obstruction drainable fluid collection or focal area of inflammatory  change. Surgically absent appendix.  2. Uncomplicated cholelithiasis.  3. Probable proteinaceous/hemorrhagic cyst in the posterior lateral left  kidney measuring 10 mm. This  could be confirmed with renal ultrasound.  Additional benign simple cyst arising from the lower pole left kidney  requiring no additional follow-up.  4. Leiomyomatous change of uterus.        This report was finalized on 8/1/2023 3:39 PM by Dr. Tristan Cooley MD.       XR Chest 1 View [[id number]] Collected: 08/01/23 1341     Updated: 08/01/23 1444    Narrative:      XR CHEST 1 VW-: 8/1/2023 3:29 PM     INDICATION:   Short of air 2 days. HISTORY of congestive heart failure hypertension  and coronary artery disease. Former smoker.     COMPARISON:    6/18/2023.     FINDINGS:  Single Portable AP view(s) of the chest. Cardiac silhouette is enlarged.  The aortic knob is prominent and atherosclerotic, unchanged.  Interstitial prominence and cephalization most characteristic of mild  volume overload with probable mild perihilar edema. There is no dense  consolidation or effusion. No pneumothorax. There is thoracic  spondylosis.       Impression:         1. Cardiomegaly and probable volume overload. Inflammatory/infectious  infiltrates felt to be less likely. Follow-up to clearing recommended.  No pneumothorax.     This report was finalized on 8/1/2023 2:42 PM by Dr. Tristan Cooley MD.             Reviewed    ECG/EMG Results (most recent)       Procedure Component Value Units Date/Time    ECG 12 Lead ED Triage Standing Order; Weak / Dizzy / AMS [[id number]] Collected: 08/01/23 1313     Updated: 08/01/23 1315     QT Interval 407 ms     Narrative:      HEART RATE= 71  bpm  RR Interval= 848  ms  TN Interval= 193  ms  P Horizontal Axis= 135  deg  P Front Axis= -6  deg  QRSD Interval= 99  ms  QT Interval= 407  ms  QRS Axis= -18  deg  T Wave Axis= 119  deg  - OTHERWISE NORMAL ECG -  Sinus rhythm  Borderline left axis deviation  Electronically Signed By:   Date and Time of Study: 2023-08-01 13:13:42          Reviewed    Assessment & Plan     Acute on chronic anemia  -Hemoglobin at baseline approximately 8-9, currently 6.1  -Suspected  etiology is hemorrhagic cyst of the kidney as seen on CT abdomen pelvis, will further evaluate with renal ultrasound, plan urology consult once hemorrhagic cyst confirmed.   -We will transfuse patient with 2 units packed red blood cells and check H&H thereafter  -Anemia is currently macrocytic.   -BUN elevated likely showing active bleeding, will follow CBC closely.     Elevated troponin likely secondary to above  -Initial troponin was 29 down trended to 28.   -Has down trended with 2-hour recheck, no chest pain reported by patient.   -Monitor    Elevated creatinine  -Suspect secondary to hypovolemia with active bleeding  -Creatinine at baseline appears to be 1.2-1.3, currently 1.82   -we will place on IV fluid and monitor    Asymptomatic bacteriuria  -UA shows 6-12 WBCs, 4+ bacteria, and small leuk esterase  -Patient is asymptomatic, monitor for symptoms        Chronic stable conditions to be managed with home medications include the following conditions listed below. Also please note: Every effort was made to accurately obtain patient's home medications. This was done utilizing extensive chart review, ED documentation, recent pharmacy records, and where possible thorough discussion with the patient if medications were known by the patient. I have reviewed and edited the patient's home medications as per my efforts above and current med list can be found within home medications portion of this electronic medical record and is accurate as possible as of 08/01/23     Osteoporosis-resume alendronate on discharge    Hypertension-continue amlodipine and metoprolol    Iron deficiency anemia-continue home iron supplement, treating anemia otherwise as above    Peripheral neuropathy-continue home gabapentin    Depression and loss of appetite-continue mirtazapine    GERD-utilize Protonix for home PPI substitute    Hyperlipidemia-continue pravastatin    DVT PPX: SCDs        I discussed the patient's findings and my  recommendations with patient     Alberto Newman DO  08/01/23  15:53 EDT    Time: 37 mins     At [facility], we believe that sharing information builds trust and better relationships. You are receiving this note because you recently visited [facility]. It is possible you will see health information before a provider has talked with you about it. This kind of information can be easy to misunderstand. To help you fully understand what it means for your health, we urge you to discuss this note with your provider.        Electronically signed by Alberto Newman DO at 08/01/23 1639       Lab Results (last 24 hours)       Procedure Component Value Units Date/Time    Iron Profile [[id number]]  (Abnormal) Collected: 08/02/23 1339    Specimen: Blood Updated: 08/02/23 1557     Iron 42 mcg/dL      Iron Saturation (TSAT) 12 %      TIBC 341 mcg/dL      UIBC 299 mcg/dL     Ferritin [[id number]]  (Normal) Collected: 08/02/23 1339    Specimen: Blood Updated: 08/02/23 1557     Ferritin 53.00 ng/mL     Narrative:      Results may be falsely decreased if patient taking Biotin.      Vitamin B12 [[id number]] Collected: 08/02/23 1339    Specimen: Blood Updated: 08/02/23 1543    Folate [[id number]] Collected: 08/02/23 1339    Specimen: Blood Updated: 08/02/23 1543    Hemoglobin & Hematocrit, Blood [[id number]]  (Abnormal) Collected: 08/02/23 1339    Specimen: Blood Updated: 08/02/23 1351     Hemoglobin 8.8 g/dL      Hematocrit 27.9 %     [place] Draw [[id number]] Collected: 08/01/23 1315    Specimen: Blood Updated: 08/02/23 0705    Narrative:      The following orders were created for panel order [place] Draw.  Procedure                               Abnormality         Status                     ---------                               -----------         ------                     Green Top (Gel)[[id number]]                                  Final result               Lavender Top[[id number]]                                      Final result               Gold Top - SST[[id number]]                                                              Light Blue Top[[id number]]                                                                Please view results for these tests on the individual orders.    Basic Metabolic Panel [[id number]]  (Abnormal) Collected: 08/02/23 0513    Specimen: Blood Updated: 08/02/23 0540     Glucose 88 mg/dL      BUN 55 mg/dL      Creatinine 1.62 mg/dL      Sodium 144 mmol/L      Potassium 3.7 mmol/L      Chloride 108 mmol/L      CO2 26.0 mmol/L      Calcium 8.2 mg/dL      BUN/Creatinine Ratio 34.0     Anion Gap 10.0 mmol/L      eGFR 29.7 mL/min/1.73     Narrative:      GFR Normal >60  Chronic Kidney Disease <60  Kidney Failure <15    The GFR formula is only valid for adults with stable renal function between ages 18 and 70.    CBC & Differential [[id number]]  (Abnormal) Collected: 08/02/23 0513    Specimen: Blood Updated: 08/02/23 0527    Narrative:      The following orders were created for panel order CBC & Differential.  Procedure                               Abnormality         Status                     ---------                               -----------         ------                     CBC Auto Differential[[id number]]        Abnormal            Final result                 Please view results for these tests on the individual orders.    CBC Auto Differential [[id number]]  (Abnormal) Collected: 08/02/23 0513    Specimen: Blood Updated: 08/02/23 0527     WBC 8.79 10*3/mm3      RBC 2.41 10*6/mm3      Hemoglobin 7.2 g/dL      Hematocrit 23.0 %      MCV 95.4 fL      MCH 29.9 pg      MCHC 31.3 g/dL      RDW 18.7 %      RDW-SD 63.3 fl      MPV 11.2 fL      Platelets 172 10*3/mm3      Neutrophil % 64.3 %      Lymphocyte % 23.5 %      Monocyte % 9.1 %      Eosinophil % 2.3 %      Basophil % 0.3 %      Immature Grans % 0.5 %      Neutrophils, Absolute 5.65 10*3/mm3      Lymphocytes, Absolute 2.07 10*3/mm3      Monocytes,  Absolute 0.80 10*3/mm3      Eosinophils, Absolute 0.20 10*3/mm3      Basophils, Absolute 0.03 10*3/mm3      Immature Grans, Absolute 0.04 10*3/mm3      nRBC 0.0 /100 WBC           Imaging Results (Last 24 Hours)       Procedure Component Value Units Date/Time    US Renal Bilateral [[id number]] Collected: 08/02/23 0834     Updated: 08/02/23 0944    Narrative:      BILATERAL RENAL ULTRASOUND, 8/2/2023     HISTORY:  Abnormal CT yesterday demonstrating probable complex cyst in the  posterolateral left kidney measuring 10 mm. Ultrasound requested for  further evaluation and to exclude solid mass.     TECHNIQUE:  Grayscale ultrasound imaging of both kidneys and the urinary bladder was  performed. Correlation made with abdomen and pelvis CT 8/1/2023.     FINDINGS:  Bladder decompressed and not well visualized or assessed. The right  kidney is nonobstructed and measures approximately 9.7 cm. No shadowing  stone. There is cortical thinning. There is a simple cyst laterally  measuring 1.2 cm corresponding with a simple cyst on prior CT. The left  kidney is nonobstructed and also demonstrates cortical thinning. Left  kidney measures 11.1 cm. No shadowing stone. There is a simple cyst in  the lateral lower pole left kidney measuring 1.3 cm corresponding to a  simple cyst on the prior CT. The lesion of interest in the lateral mid  to lower pole left kidney measuring 10 mm is not visible on ultrasound  imaging today. Given patient age category and diminished renal function,  rather than proceeding with further evaluation with dedicated renal  protocol CT, consider 6-month follow-up noncontrast CT to reassess  stability of the probable proteinaceous cyst on the prior CT study.       Impression:         1. No hydronephrosis or shadowing stone on either side. Cortical  thinning that is nonspecific but often associated with chronic renal  parenchymal disease.  2. The lesion of interest felt to represent a  proteinaceous/hemorrhagic  cyst in the lateral midpole left kidney is not visible on ultrasound  today. See discussion above regarding 6-month follow-up noncontrast CT.  3. Bilateral simple renal cysts corresponding to simple cysts on prior  CT.     This report was finalized on 8/2/2023 9:42 AM by Dr. Tristan Cooley MD.             ECG/EMG Results (last 24 hours)       ** No results found for the last 24 hours. **             Physician Progress Notes (last 24 hours)        Alberto Newman DO at 08/02/23 1053          Renal US shows no evidence of hemorrhagic cyst. Given elevated BUN, Hgb not improving to expected range with 2 units PRBC's, I've cancelled the Urology consult and instead have placed GI consult.     Electronically signed by Alberto Newman DO at 08/02/23 1054       Alberto Newman DO at 08/02/23 2392          SERVICE: [facility] HOSPITALIST    CONSULTANTS: Neurology    CHIEF COMPLAINT: Follow-up acute on chronic anemia    SUBJECTIVE: Patient seen and examined at bedside. Patient reports no acute issues.  She overall feels slightly better today.  She denies any signs of bleeding.  Nursing staff reports no acute issues.     OBJECTIVE:    Physical exam is largely unchanged from previous exam, except where documented below, examination is accurate as of 8/2/2023    Physical Exam:  General: Patient is awake and alert.  Elderly female. No acute distress noted.   HENT: Head is atraumatic, normocephalic. Hearing is grossly intact. Nose is without obvious congestion and appears patent. Neck is supple and trachea is midline.   Eyes: Vision is grossly intact. Pupils appear equal and round.   Cardiovascular: Heart has regular rate and rhythm with no murmurs, rubs or gallops noted.   Respiratory: Lungs are clear to ausculation without wheezes, rhonchi or rales.   Abdominal/GI: Soft, nontender, bowel sounds present. No rebound or guarding present.   Extremities: No peripheral edema  "noted.   Musculoskeletal: Spontaneous movement of bilateral upper and lower extremities against gravity noted. No signs of injury or deformity noted.   Skin: Warm and dry.   Psych: Mood and affect are appropriate. Cooperative with exam.   Neuro: No facial asymmetry noted. No focal deficits noted, hearing and vision are grossly intact.     /59 (BP Location: Left arm, Patient Position: Lying)   Pulse 72   Temp 97.8 øF (36.6 øC) (Oral)   Resp 18   Ht 157.5 cm (62\")   Wt 82.6 kg (182 lb)   SpO2 99%   BMI 33.29 kg/mý     MEDS/LABS REVIEWED AND ORDERED    amiodarone, 200 mg, Oral, Daily  amLODIPine, 5 mg, Oral, Daily  cholecalciferol, 400 Units, Oral, BID  ferrous sulfate, 324 mg, Oral, Daily With Breakfast  gabapentin, 100 mg, Oral, BID  magnesium oxide, 400 mg, Oral, Daily  metoprolol tartrate, 12.5 mg, Oral, Q12H  mirtazapine, 15 mg, Oral, Nightly  pantoprazole, 40 mg, Oral, QAM  pravastatin, 80 mg, Oral, Nightly  senna-docusate sodium, 2 tablet, Oral, BID  sodium chloride, 10 mL, Intravenous, Q12H  sodium chloride, , ,           LAB/DIAGNOSTICS:    Lab Results (last 24 hours)       Procedure Component Value Units Date/Time    [place] Draw [[id number]] Collected: 08/01/23 1315    Specimen: Blood Updated: 08/02/23 0705    Narrative:      The following orders were created for panel order [place] Draw.  Procedure                               Abnormality         Status                     ---------                               -----------         ------                     Green Top (Gel)[[id number]]                                  Final result               Lavender Top[[id number]]                                     Final result               Gold Top - SST[[id number]]                                                              Light Blue Top[[id number]]                                                                Please view results for these tests on the individual orders.    Basic Metabolic Panel " [[id number]]  (Abnormal) Collected: 08/02/23 0513    Specimen: Blood Updated: 08/02/23 0540     Glucose 88 mg/dL      BUN 55 mg/dL      Creatinine 1.62 mg/dL      Sodium 144 mmol/L      Potassium 3.7 mmol/L      Chloride 108 mmol/L      CO2 26.0 mmol/L      Calcium 8.2 mg/dL      BUN/Creatinine Ratio 34.0     Anion Gap 10.0 mmol/L      eGFR 29.7 mL/min/1.73     Narrative:      GFR Normal >60  Chronic Kidney Disease <60  Kidney Failure <15    The GFR formula is only valid for adults with stable renal function between ages 18 and 70.    CBC & Differential [[id number]]  (Abnormal) Collected: 08/02/23 0513    Specimen: Blood Updated: 08/02/23 0527    Narrative:      The following orders were created for panel order CBC & Differential.  Procedure                               Abnormality         Status                     ---------                               -----------         ------                     CBC Auto Differential[[id number]]        Abnormal            Final result                 Please view results for these tests on the individual orders.    CBC Auto Differential [[id number]]  (Abnormal) Collected: 08/02/23 0513    Specimen: Blood Updated: 08/02/23 0527     WBC 8.79 10*3/mm3      RBC 2.41 10*6/mm3      Hemoglobin 7.2 g/dL      Hematocrit 23.0 %      MCV 95.4 fL      MCH 29.9 pg      MCHC 31.3 g/dL      RDW 18.7 %      RDW-SD 63.3 fl      MPV 11.2 fL      Platelets 172 10*3/mm3      Neutrophil % 64.3 %      Lymphocyte % 23.5 %      Monocyte % 9.1 %      Eosinophil % 2.3 %      Basophil % 0.3 %      Immature Grans % 0.5 %      Neutrophils, Absolute 5.65 10*3/mm3      Lymphocytes, Absolute 2.07 10*3/mm3      Monocytes, Absolute 0.80 10*3/mm3      Eosinophils, Absolute 0.20 10*3/mm3      Basophils, Absolute 0.03 10*3/mm3      Immature Grans, Absolute 0.04 10*3/mm3      nRBC 0.0 /100 WBC     Urinalysis, Microscopic Only - Urine, Clean Catch [[id number]]  (Abnormal) Collected: 08/01/23 1443    Specimen: Urine,  Clean Catch Updated: 08/01/23 1522     RBC, UA 3-5 /HPF      WBC, UA 6-12 /HPF      Bacteria, UA 4+ /HPF      Squamous Epithelial Cells, UA 0-2 /HPF      Hyaline Casts, UA None Seen /LPF      Methodology Manual Light Microscopy    Single High Sensitivity Troponin T [[id number]]  (Abnormal) Collected: 08/01/23 1446    Specimen: Blood from Arm, Right Updated: 08/01/23 1507     HS Troponin T 28 ng/L     Narrative:      High Sensitive Troponin T Reference Range:  <10.0 ng/L- Negative Female for AMI  <15.0 ng/L- Negative Male for AMI  >=10 - Abnormal Female indicating possible myocardial injury.  >=15 - Abnormal Male indicating possible myocardial injury.   Clinicians would have to utilize clinical acumen, EKG, Troponin, and serial changes to determine if it is an Acute Myocardial Infarction or myocardial injury due to an underlying chronic condition.         Urinalysis With Microscopic If Indicated (No Culture) - Urine, Clean Catch [[id number]]  (Abnormal) Collected: 08/01/23 1443    Specimen: Urine, Clean Catch Updated: 08/01/23 1457     Color, UA Yellow     Appearance, UA Clear     pH, UA 6.0     Specific Gravity, UA 1.015     Glucose, UA Negative     Ketones, UA Negative     Bilirubin, UA Negative     Blood, UA Small (1+)     Protein, UA Negative     Leuk Esterase, UA Small (1+)     Nitrite, UA Negative     Urobilinogen, UA 0.2 E.U./dL    Green Top (Gel) [[id number]] Collected: 08/01/23 1315    Specimen: Blood Updated: 08/01/23 1431     Extra Tube Hold for add-ons.     Comment: Auto resulted.       Lavender Top [[id number]] Collected: 08/01/23 1315    Specimen: Blood Updated: 08/01/23 1431     Extra Tube hold for add-on     Comment: Auto resulted       COVID-19 and FLU A/B PCR - Swab, Nasopharynx [[id number]]  (Normal) Collected: 08/01/23 1340    Specimen: Swab from Nasopharynx Updated: 08/01/23 1403     COVID19 Not Detected     Influenza A PCR Not Detected     Influenza B PCR Not Detected    Narrative:      Fact sheet  for providers: https://www.fda.gov/media/724347/download    Fact sheet for patients: https://www.fda.gov/media/284163/download    Test performed by PCR.    Comprehensive Metabolic Panel [[id number]]  (Abnormal) Collected: 08/01/23 1315    Specimen: Blood Updated: 08/01/23 1341     Glucose 115 mg/dL      BUN 52 mg/dL      Creatinine 1.82 mg/dL      Sodium 144 mmol/L      Potassium 4.0 mmol/L      Chloride 105 mmol/L      CO2 27.3 mmol/L      Calcium 8.9 mg/dL      Total Protein 6.0 g/dL      Albumin 4.0 g/dL      ALT (SGPT) 8 U/L      AST (SGOT) 14 U/L      Alkaline Phosphatase 64 U/L      Total Bilirubin 0.3 mg/dL      Globulin 2.0 gm/dL      A/G Ratio 2.0 g/dL      BUN/Creatinine Ratio 28.6     Anion Gap 11.7 mmol/L      eGFR 25.8 mL/min/1.73     Narrative:      GFR Normal >60  Chronic Kidney Disease <60  Kidney Failure <15    The GFR formula is only valid for adults with stable renal function between ages 18 and 70.    Magnesium [[id number]]  (Abnormal) Collected: 08/01/23 1315    Specimen: Blood Updated: 08/01/23 1341     Magnesium 2.5 mg/dL     Single High Sensitivity Troponin T [[id number]]  (Abnormal) Collected: 08/01/23 1315    Specimen: Blood Updated: 08/01/23 1340     HS Troponin T 29 ng/L     Narrative:      High Sensitive Troponin T Reference Range:  <10.0 ng/L- Negative Female for AMI  <15.0 ng/L- Negative Male for AMI  >=10 - Abnormal Female indicating possible myocardial injury.  >=15 - Abnormal Male indicating possible myocardial injury.   Clinicians would have to utilize clinical acumen, EKG, Troponin, and serial changes to determine if it is an Acute Myocardial Infarction or myocardial injury due to an underlying chronic condition.         CBC & Differential [[id number]]  (Abnormal) Collected: 08/01/23 1315    Specimen: Blood Updated: 08/01/23 1323    Narrative:      The following orders were created for panel order CBC & Differential.  Procedure                               Abnormality         Status                      ---------                               -----------         ------                     CBC Auto Differential[[id number]]        Abnormal            Final result                 Please view results for these tests on the individual orders.    CBC Auto Differential [[id number]]  (Abnormal) Collected: 08/01/23 1315    Specimen: Blood Updated: 08/01/23 1323     WBC 10.17 10*3/mm3      RBC 2.11 10*6/mm3      Hemoglobin 6.1 g/dL      Hematocrit 21.0 %      MCV 99.5 fL      MCH 28.9 pg      MCHC 29.0 g/dL      RDW 20.9 %      RDW-SD 73.9 fl      MPV 10.9 fL      Platelets 234 10*3/mm3      Neutrophil % 69.3 %      Lymphocyte % 19.3 %      Monocyte % 9.4 %      Eosinophil % 1.0 %      Basophil % 0.5 %      Immature Grans % 0.5 %      Neutrophils, Absolute 7.05 10*3/mm3      Lymphocytes, Absolute 1.96 10*3/mm3      Monocytes, Absolute 0.96 10*3/mm3      Eosinophils, Absolute 0.10 10*3/mm3      Basophils, Absolute 0.05 10*3/mm3      Immature Grans, Absolute 0.05 10*3/mm3      nRBC 0.0 /100 WBC           ECG 12 Lead ED Triage Standing Order; Weak / Dizzy / AMS   Final Result   HEART RATE= 71  bpm   RR Interval= 848  ms   NH Interval= 193  ms   P Horizontal Axis= 135  deg   P Front Axis= -6  deg   QRSD Interval= 99  ms   QT Interval= 407  ms   QRS Axis= -18  deg   T Wave Axis= 119  deg   - OTHERWISE NORMAL ECG -   Sinus rhythm   Borderline left axis deviation   anterior and lateral t wave inversions   No change from prior tracing   Electronically Signed By: May Fiore ([facility]) 01-Aug-2023 16:10:14   Date and Time of Study: 2023-08-01 13:13:42        Results for orders placed during the hospital encounter of 02/28/18    Adult Transthoracic Echo Complete W/ Cont if Necessary Per Protocol    Interpretation Summary  ú Left atrial cavity size is mildly dilated.  ú There is calcification of the aortic valve.  ú Mild-to-moderate mitral valve regurgitation is present  ú Mild tricuspid valve regurgitation is  present.  ú Left Ventricle: Calculated EF = 62.9%.  ú There is no evidence of pericardial effusion    CT Abdomen Pelvis Without Contrast    Result Date: 8/1/2023  1. No clearly acute process in the abdomen or pelvis. No bowel obstruction drainable fluid collection or focal area of inflammatory change. Surgically absent appendix. 2. Uncomplicated cholelithiasis. 3. Probable proteinaceous/hemorrhagic cyst in the posterior lateral left kidney measuring 10 mm. This could be confirmed with renal ultrasound. Additional benign simple cyst arising from the lower pole left kidney requiring no additional follow-up. 4. Leiomyomatous change of uterus.   This report was finalized on 8/1/2023 3:39 PM by Dr. Tristan Cooley MD.      XR Chest 1 View    Result Date: 8/1/2023   1. Cardiomegaly and probable volume overload. Inflammatory/infectious infiltrates felt to be less likely. Follow-up to clearing recommended. No pneumothorax.  This report was finalized on 8/1/2023 2:42 PM by Dr. Tristan Cooley MD.         ASSESSMENT/PLAN:  Please note portions of this assessment/plan may have been copied and pasted, but I have personally seen this patient and reviewed each line of this assessment and plan for accuracy and made updates to reflect my necessary changes on 8/2/2023    Acute on chronic anemia  -Suspect secondary to hemorrhagic cyst on kidney as seen on CT abdomen pelvis, awaiting renal ultrasound, will ask Urology to evaluate.   -Patient received 2 units packed red blood cell transfusion yesterday, hemoglobin improved to 7.2, will provide additional unit today  -BUN remains elevated    Elevated creatinine  -Improving, suspect secondary to anemia, treat as above monitor    Elevated troponin  -Down trended yesterday in ED, no additional chest pain reported.  Monitor.     Asymptomatic bacteriuria  -Patient continues to be asymptomatic.     Osteoporosis-resume alendronate on discharge     Hypertension-continue amlodipine and metoprolol    "  Iron deficiency anemia-continue home iron supplement, treating anemia otherwise as above     Peripheral neuropathy-continue home gabapentin     Depression and loss of appetite-continue mirtazapine     GERD-utilize Protonix for home PPI substitute     Hyperlipidemia-continue pravastatin     DVT PPX: SCDs      PLAN FOR DISPOSITION: TBD    Alberto Newman DO  Hospitalist, [facility]  08/02/23  07:22 EDT    At [facility], we believe that sharing information builds trust and better relationships. You are receiving this note because you recently visited [facility]. It is possible you will see health information before a provider has talked with you about it. This kind of information can be easy to misunderstand. To help you fully understand what it means for your health, we urge you to discuss this note with your provider.    \"Dictated utilizing Dragon dictation\"      Electronically signed by Alberto Newman DO at 08/02/23 0819       Consult Notes (last 24 hours)  Notes from 08/01/23 1621 through 08/02/23 1621   No notes of this type exist for this encounter.       "

## 2023-08-02 NOTE — PROGRESS NOTES
"SERVICE: [facility] HOSPITALIST    CONSULTANTS: Neurology    CHIEF COMPLAINT: Follow-up acute on chronic anemia    SUBJECTIVE: Patient seen and examined at bedside. Patient reports no acute issues.  She overall feels slightly better today.  She denies any signs of bleeding.  Nursing staff reports no acute issues.     OBJECTIVE:    Physical exam is largely unchanged from previous exam, except where documented below, examination is accurate as of 8/2/2023    Physical Exam:  General: Patient is awake and alert.  Elderly female. No acute distress noted.   HENT: Head is atraumatic, normocephalic. Hearing is grossly intact. Nose is without obvious congestion and appears patent. Neck is supple and trachea is midline.   Eyes: Vision is grossly intact. Pupils appear equal and round.   Cardiovascular: Heart has regular rate and rhythm with no murmurs, rubs or gallops noted.   Respiratory: Lungs are clear to ausculation without wheezes, rhonchi or rales.   Abdominal/GI: Soft, nontender, bowel sounds present. No rebound or guarding present.   Extremities: No peripheral edema noted.   Musculoskeletal: Spontaneous movement of bilateral upper and lower extremities against gravity noted. No signs of injury or deformity noted.   Skin: Warm and dry.   Psych: Mood and affect are appropriate. Cooperative with exam.   Neuro: No facial asymmetry noted. No focal deficits noted, hearing and vision are grossly intact.     /59 (BP Location: Left arm, Patient Position: Lying)   Pulse 72   Temp 97.8 øF (36.6 øC) (Oral)   Resp 18   Ht 157.5 cm (62\")   Wt 82.6 kg (182 lb)   SpO2 99%   BMI 33.29 kg/mý     MEDS/LABS REVIEWED AND ORDERED    amiodarone, 200 mg, Oral, Daily  amLODIPine, 5 mg, Oral, Daily  cholecalciferol, 400 Units, Oral, BID  ferrous sulfate, 324 mg, Oral, Daily With Breakfast  gabapentin, 100 mg, Oral, BID  magnesium oxide, 400 mg, Oral, Daily  metoprolol tartrate, 12.5 mg, Oral, Q12H  mirtazapine, 15 " mg, Oral, Nightly  pantoprazole, 40 mg, Oral, QAM  pravastatin, 80 mg, Oral, Nightly  senna-docusate sodium, 2 tablet, Oral, BID  sodium chloride, 10 mL, Intravenous, Q12H  sodium chloride, , ,           LAB/DIAGNOSTICS:    Lab Results (last 24 hours)       Procedure Component Value Units Date/Time    [place] Draw [[id number]] Collected: 08/01/23 1315    Specimen: Blood Updated: 08/02/23 0705    Narrative:      The following orders were created for panel order [place] Draw.  Procedure                               Abnormality         Status                     ---------                               -----------         ------                     Green Top (Gel)[[id number]]                                  Final result               Lavender Top[[id number]]                                     Final result               Gold Top - SST[[id number]]                                                              Light Blue Top[[id number]]                                                                Please view results for these tests on the individual orders.    Basic Metabolic Panel [[id number]]  (Abnormal) Collected: 08/02/23 0513    Specimen: Blood Updated: 08/02/23 0540     Glucose 88 mg/dL      BUN 55 mg/dL      Creatinine 1.62 mg/dL      Sodium 144 mmol/L      Potassium 3.7 mmol/L      Chloride 108 mmol/L      CO2 26.0 mmol/L      Calcium 8.2 mg/dL      BUN/Creatinine Ratio 34.0     Anion Gap 10.0 mmol/L      eGFR 29.7 mL/min/1.73     Narrative:      GFR Normal >60  Chronic Kidney Disease <60  Kidney Failure <15    The GFR formula is only valid for adults with stable renal function between ages 18 and 70.    CBC & Differential [[id number]]  (Abnormal) Collected: 08/02/23 0513    Specimen: Blood Updated: 08/02/23 0527    Narrative:      The following orders were created for panel order CBC & Differential.  Procedure                               Abnormality         Status                     ---------                                -----------         ------                     CBC Auto Differential[[id number]]        Abnormal            Final result                 Please view results for these tests on the individual orders.    CBC Auto Differential [[id number]]  (Abnormal) Collected: 08/02/23 0513    Specimen: Blood Updated: 08/02/23 0527     WBC 8.79 10*3/mm3      RBC 2.41 10*6/mm3      Hemoglobin 7.2 g/dL      Hematocrit 23.0 %      MCV 95.4 fL      MCH 29.9 pg      MCHC 31.3 g/dL      RDW 18.7 %      RDW-SD 63.3 fl      MPV 11.2 fL      Platelets 172 10*3/mm3      Neutrophil % 64.3 %      Lymphocyte % 23.5 %      Monocyte % 9.1 %      Eosinophil % 2.3 %      Basophil % 0.3 %      Immature Grans % 0.5 %      Neutrophils, Absolute 5.65 10*3/mm3      Lymphocytes, Absolute 2.07 10*3/mm3      Monocytes, Absolute 0.80 10*3/mm3      Eosinophils, Absolute 0.20 10*3/mm3      Basophils, Absolute 0.03 10*3/mm3      Immature Grans, Absolute 0.04 10*3/mm3      nRBC 0.0 /100 WBC     Urinalysis, Microscopic Only - Urine, Clean Catch [[id number]]  (Abnormal) Collected: 08/01/23 1443    Specimen: Urine, Clean Catch Updated: 08/01/23 1522     RBC, UA 3-5 /HPF      WBC, UA 6-12 /HPF      Bacteria, UA 4+ /HPF      Squamous Epithelial Cells, UA 0-2 /HPF      Hyaline Casts, UA None Seen /LPF      Methodology Manual Light Microscopy    Single High Sensitivity Troponin T [[id number]]  (Abnormal) Collected: 08/01/23 1446    Specimen: Blood from Arm, Right Updated: 08/01/23 1507     HS Troponin T 28 ng/L     Narrative:      High Sensitive Troponin T Reference Range:  <10.0 ng/L- Negative Female for AMI  <15.0 ng/L- Negative Male for AMI  >=10 - Abnormal Female indicating possible myocardial injury.  >=15 - Abnormal Male indicating possible myocardial injury.   Clinicians would have to utilize clinical acumen, EKG, Troponin, and serial changes to determine if it is an Acute Myocardial Infarction or myocardial injury due to an underlying chronic condition.          Urinalysis With Microscopic If Indicated (No Culture) - Urine, Clean Catch [[id number]]  (Abnormal) Collected: 08/01/23 1443    Specimen: Urine, Clean Catch Updated: 08/01/23 1457     Color, UA Yellow     Appearance, UA Clear     pH, UA 6.0     Specific Gravity, UA 1.015     Glucose, UA Negative     Ketones, UA Negative     Bilirubin, UA Negative     Blood, UA Small (1+)     Protein, UA Negative     Leuk Esterase, UA Small (1+)     Nitrite, UA Negative     Urobilinogen, UA 0.2 E.U./dL    Green Top (Gel) [[id number]] Collected: 08/01/23 1315    Specimen: Blood Updated: 08/01/23 1431     Extra Tube Hold for add-ons.     Comment: Auto resulted.       Lavender Top [[id number]] Collected: 08/01/23 1315    Specimen: Blood Updated: 08/01/23 1431     Extra Tube hold for add-on     Comment: Auto resulted       COVID-19 and FLU A/B PCR - Swab, Nasopharynx [[id number]]  (Normal) Collected: 08/01/23 1340    Specimen: Swab from Nasopharynx Updated: 08/01/23 1403     COVID19 Not Detected     Influenza A PCR Not Detected     Influenza B PCR Not Detected    Narrative:      Fact sheet for providers: https://www.fda.gov/media/752918/download    Fact sheet for patients: https://www.fda.gov/media/038908/download    Test performed by PCR.    Comprehensive Metabolic Panel [[id number]]  (Abnormal) Collected: 08/01/23 1315    Specimen: Blood Updated: 08/01/23 1341     Glucose 115 mg/dL      BUN 52 mg/dL      Creatinine 1.82 mg/dL      Sodium 144 mmol/L      Potassium 4.0 mmol/L      Chloride 105 mmol/L      CO2 27.3 mmol/L      Calcium 8.9 mg/dL      Total Protein 6.0 g/dL      Albumin 4.0 g/dL      ALT (SGPT) 8 U/L      AST (SGOT) 14 U/L      Alkaline Phosphatase 64 U/L      Total Bilirubin 0.3 mg/dL      Globulin 2.0 gm/dL      A/G Ratio 2.0 g/dL      BUN/Creatinine Ratio 28.6     Anion Gap 11.7 mmol/L      eGFR 25.8 mL/min/1.73     Narrative:      GFR Normal >60  Chronic Kidney Disease <60  Kidney Failure <15    The GFR formula is only  valid for adults with stable renal function between ages 18 and 70.    Magnesium [[id number]]  (Abnormal) Collected: 08/01/23 1315    Specimen: Blood Updated: 08/01/23 1341     Magnesium 2.5 mg/dL     Single High Sensitivity Troponin T [[id number]]  (Abnormal) Collected: 08/01/23 1315    Specimen: Blood Updated: 08/01/23 1340     HS Troponin T 29 ng/L     Narrative:      High Sensitive Troponin T Reference Range:  <10.0 ng/L- Negative Female for AMI  <15.0 ng/L- Negative Male for AMI  >=10 - Abnormal Female indicating possible myocardial injury.  >=15 - Abnormal Male indicating possible myocardial injury.   Clinicians would have to utilize clinical acumen, EKG, Troponin, and serial changes to determine if it is an Acute Myocardial Infarction or myocardial injury due to an underlying chronic condition.         CBC & Differential [[id number]]  (Abnormal) Collected: 08/01/23 1315    Specimen: Blood Updated: 08/01/23 1323    Narrative:      The following orders were created for panel order CBC & Differential.  Procedure                               Abnormality         Status                     ---------                               -----------         ------                     CBC Auto Differential[[id number]]        Abnormal            Final result                 Please view results for these tests on the individual orders.    CBC Auto Differential [[id number]]  (Abnormal) Collected: 08/01/23 1315    Specimen: Blood Updated: 08/01/23 1323     WBC 10.17 10*3/mm3      RBC 2.11 10*6/mm3      Hemoglobin 6.1 g/dL      Hematocrit 21.0 %      MCV 99.5 fL      MCH 28.9 pg      MCHC 29.0 g/dL      RDW 20.9 %      RDW-SD 73.9 fl      MPV 10.9 fL      Platelets 234 10*3/mm3      Neutrophil % 69.3 %      Lymphocyte % 19.3 %      Monocyte % 9.4 %      Eosinophil % 1.0 %      Basophil % 0.5 %      Immature Grans % 0.5 %      Neutrophils, Absolute 7.05 10*3/mm3      Lymphocytes, Absolute 1.96 10*3/mm3      Monocytes, Absolute 0.96  10*3/mm3      Eosinophils, Absolute 0.10 10*3/mm3      Basophils, Absolute 0.05 10*3/mm3      Immature Grans, Absolute 0.05 10*3/mm3      nRBC 0.0 /100 WBC           ECG 12 Lead ED Triage Standing Order; Weak / Dizzy / AMS   Final Result   HEART RATE= 71  bpm   RR Interval= 848  ms   WY Interval= 193  ms   P Horizontal Axis= 135  deg   P Front Axis= -6  deg   QRSD Interval= 99  ms   QT Interval= 407  ms   QRS Axis= -18  deg   T Wave Axis= 119  deg   - OTHERWISE NORMAL ECG -   Sinus rhythm   Borderline left axis deviation   anterior and lateral t wave inversions   No change from prior tracing   Electronically Signed By: May Fiore ([facility]) 01-Aug-2023 16:10:14   Date and Time of Study: 2023-08-01 13:13:42        Results for orders placed during the hospital encounter of 02/28/18    Adult Transthoracic Echo Complete W/ Cont if Necessary Per Protocol    Interpretation Summary  ú Left atrial cavity size is mildly dilated.  ú There is calcification of the aortic valve.  ú Mild-to-moderate mitral valve regurgitation is present  ú Mild tricuspid valve regurgitation is present.  ú Left Ventricle: Calculated EF = 62.9%.  ú There is no evidence of pericardial effusion    CT Abdomen Pelvis Without Contrast    Result Date: 8/1/2023  1. No clearly acute process in the abdomen or pelvis. No bowel obstruction drainable fluid collection or focal area of inflammatory change. Surgically absent appendix. 2. Uncomplicated cholelithiasis. 3. Probable proteinaceous/hemorrhagic cyst in the posterior lateral left kidney measuring 10 mm. This could be confirmed with renal ultrasound. Additional benign simple cyst arising from the lower pole left kidney requiring no additional follow-up. 4. Leiomyomatous change of uterus.   This report was finalized on 8/1/2023 3:39 PM by Dr. Tristan Cooley MD.      XR Chest 1 View    Result Date: 8/1/2023   1. Cardiomegaly and probable volume overload. Inflammatory/infectious infiltrates felt to be less  "likely. Follow-up to clearing recommended. No pneumothorax.  This report was finalized on 8/1/2023 2:42 PM by Dr. Tristan Cooley MD.         ASSESSMENT/PLAN:  Please note portions of this assessment/plan may have been copied and pasted, but I have personally seen this patient and reviewed each line of this assessment and plan for accuracy and made updates to reflect my necessary changes on 8/2/2023    Acute on chronic anemia  -Suspect secondary to hemorrhagic cyst on kidney as seen on CT abdomen pelvis, awaiting renal ultrasound, will ask Urology to evaluate.   -Patient received 2 units packed red blood cell transfusion yesterday, hemoglobin improved to 7.2, will provide additional unit today  -BUN remains elevated    Elevated creatinine  -Improving, suspect secondary to anemia, treat as above monitor    Elevated troponin  -Down trended yesterday in ED, no additional chest pain reported.  Monitor.     Asymptomatic bacteriuria  -Patient continues to be asymptomatic.     Osteoporosis-resume alendronate on discharge     Hypertension-continue amlodipine and metoprolol     Iron deficiency anemia-continue home iron supplement, treating anemia otherwise as above     Peripheral neuropathy-continue home gabapentin     Depression and loss of appetite-continue mirtazapine     GERD-utilize Protonix for home PPI substitute     Hyperlipidemia-continue pravastatin     DVT PPX: SCDs      PLAN FOR DISPOSITION: ALEXANDRA Newman DO  Hospitalist, [facility]  08/02/23  07:22 EDT    At [facility], we believe that sharing information builds trust and better relationships. You are receiving this note because you recently visited [facility]. It is possible you will see health information before a provider has talked with you about it. This kind of information can be easy to misunderstand. To help you fully understand what it means for your health, we urge you to discuss this note with your provider.    \"Dictated " "utilizing Dragon dictation\"    "

## 2023-08-02 NOTE — PLAN OF CARE
Goal Outcome Evaluation:  Plan of Care Reviewed With: patient        Progress: improving  Outcome Evaluation: vss. 1 unit PRBCs infused this shift. Hgb increased to 8.8. tele in place, nsr. pt to be NPO at midnight. pt up with walker/standby assist. pt has no other complaints at this time.

## 2023-08-03 ENCOUNTER — ANESTHESIA (OUTPATIENT)
Dept: PERIOP | Facility: HOSPITAL | Age: 88
DRG: 812 | End: 2023-08-03
Payer: MEDICARE

## 2023-08-03 ENCOUNTER — ANESTHESIA EVENT (OUTPATIENT)
Dept: PERIOP | Facility: HOSPITAL | Age: 88
DRG: 812 | End: 2023-08-03
Payer: MEDICARE

## 2023-08-03 LAB
ANION GAP SERPL CALCULATED.3IONS-SCNC: 9.4 MMOL/L (ref 5–15)
BASOPHILS # BLD AUTO: 0.03 10*3/MM3 (ref 0–0.2)
BASOPHILS NFR BLD AUTO: 0.4 % (ref 0–1.5)
BH BB BLOOD EXPIRATION DATE: NORMAL
BH BB BLOOD TYPE BARCODE: 5100
BH BB DISPENSE STATUS: NORMAL
BH BB PRODUCT CODE: NORMAL
BH BB UNIT NUMBER: NORMAL
BUN SERPL-MCNC: 47 MG/DL (ref 8–23)
BUN/CREAT SERPL: 29.9 (ref 7–25)
CALCIUM SPEC-SCNC: 8.4 MG/DL (ref 8.2–9.6)
CHLORIDE SERPL-SCNC: 109 MMOL/L (ref 98–107)
CO2 SERPL-SCNC: 25.6 MMOL/L (ref 22–29)
CREAT SERPL-MCNC: 1.57 MG/DL (ref 0.57–1)
CROSSMATCH INTERPRETATION: NORMAL
DEPRECATED RDW RBC AUTO: 68.6 FL (ref 37–54)
EGFRCR SERPLBLD CKD-EPI 2021: 30.8 ML/MIN/1.73
EOSINOPHIL # BLD AUTO: 0.39 10*3/MM3 (ref 0–0.4)
EOSINOPHIL NFR BLD AUTO: 5 % (ref 0.3–6.2)
ERYTHROCYTE [DISTWIDTH] IN BLOOD BY AUTOMATED COUNT: 19.9 % (ref 12.3–15.4)
GLUCOSE SERPL-MCNC: 82 MG/DL (ref 65–99)
HCT VFR BLD AUTO: 24.8 % (ref 34–46.6)
HGB BLD-MCNC: 7.5 G/DL (ref 12–15.9)
IMM GRANULOCYTES # BLD AUTO: 0.02 10*3/MM3 (ref 0–0.05)
IMM GRANULOCYTES NFR BLD AUTO: 0.3 % (ref 0–0.5)
LYMPHOCYTES # BLD AUTO: 2.49 10*3/MM3 (ref 0.7–3.1)
LYMPHOCYTES NFR BLD AUTO: 31.9 % (ref 19.6–45.3)
MCH RBC QN AUTO: 29.8 PG (ref 26.6–33)
MCHC RBC AUTO-ENTMCNC: 30.2 G/DL (ref 31.5–35.7)
MCV RBC AUTO: 98.4 FL (ref 79–97)
MONOCYTES # BLD AUTO: 0.76 10*3/MM3 (ref 0.1–0.9)
MONOCYTES NFR BLD AUTO: 9.7 % (ref 5–12)
NEUTROPHILS NFR BLD AUTO: 4.12 10*3/MM3 (ref 1.7–7)
NEUTROPHILS NFR BLD AUTO: 52.7 % (ref 42.7–76)
PLATELET # BLD AUTO: 182 10*3/MM3 (ref 140–450)
PMV BLD AUTO: 11.1 FL (ref 6–12)
POTASSIUM SERPL-SCNC: 3.8 MMOL/L (ref 3.5–5.2)
RBC # BLD AUTO: 2.52 10*6/MM3 (ref 3.77–5.28)
SODIUM SERPL-SCNC: 144 MMOL/L (ref 136–145)
UNIT  ABO: NORMAL
UNIT  RH: NORMAL
WBC NRBC COR # BLD: 7.81 10*3/MM3 (ref 3.4–10.8)

## 2023-08-03 PROCEDURE — 25010000002 PROPOFOL 200 MG/20ML EMULSION: Performed by: REGISTERED NURSE

## 2023-08-03 PROCEDURE — 85025 COMPLETE CBC W/AUTO DIFF WBC: CPT | Performed by: INTERNAL MEDICINE

## 2023-08-03 PROCEDURE — 94761 N-INVAS EAR/PLS OXIMETRY MLT: CPT

## 2023-08-03 PROCEDURE — 80048 BASIC METABOLIC PNL TOTAL CA: CPT | Performed by: INTERNAL MEDICINE

## 2023-08-03 PROCEDURE — 0DJ08ZZ INSPECTION OF UPPER INTESTINAL TRACT, VIA NATURAL OR ARTIFICIAL OPENING ENDOSCOPIC: ICD-10-PCS | Performed by: STUDENT IN AN ORGANIZED HEALTH CARE EDUCATION/TRAINING PROGRAM

## 2023-08-03 PROCEDURE — 43236 UPPR GI SCOPE W/SUBMUC INJ: CPT | Performed by: STUDENT IN AN ORGANIZED HEALTH CARE EDUCATION/TRAINING PROGRAM

## 2023-08-03 PROCEDURE — 94799 UNLISTED PULMONARY SVC/PX: CPT

## 2023-08-03 PROCEDURE — 99232 SBSQ HOSP IP/OBS MODERATE 35: CPT | Performed by: INTERNAL MEDICINE

## 2023-08-03 RX ORDER — LIDOCAINE HYDROCHLORIDE 20 MG/ML
INJECTION, SOLUTION INFILTRATION; PERINEURAL AS NEEDED
Status: DISCONTINUED | OUTPATIENT
Start: 2023-08-03 | End: 2023-08-03 | Stop reason: SURG

## 2023-08-03 RX ORDER — ONDANSETRON 2 MG/ML
4 INJECTION INTRAMUSCULAR; INTRAVENOUS ONCE AS NEEDED
Status: CANCELLED | OUTPATIENT
Start: 2023-08-03

## 2023-08-03 RX ORDER — SODIUM CHLORIDE 0.9 % (FLUSH) 0.9 %
10 SYRINGE (ML) INJECTION EVERY 12 HOURS SCHEDULED
Status: DISCONTINUED | OUTPATIENT
Start: 2023-08-03 | End: 2023-08-03 | Stop reason: HOSPADM

## 2023-08-03 RX ORDER — SODIUM CHLORIDE 0.9 % (FLUSH) 0.9 %
10 SYRINGE (ML) INJECTION AS NEEDED
Status: DISCONTINUED | OUTPATIENT
Start: 2023-08-03 | End: 2023-08-03 | Stop reason: HOSPADM

## 2023-08-03 RX ORDER — SODIUM CHLORIDE, SODIUM LACTATE, POTASSIUM CHLORIDE, CALCIUM CHLORIDE 600; 310; 30; 20 MG/100ML; MG/100ML; MG/100ML; MG/100ML
9 INJECTION, SOLUTION INTRAVENOUS CONTINUOUS
Status: DISCONTINUED | OUTPATIENT
Start: 2023-08-03 | End: 2023-08-04

## 2023-08-03 RX ORDER — SODIUM CHLORIDE 9 MG/ML
40 INJECTION, SOLUTION INTRAVENOUS AS NEEDED
Status: DISCONTINUED | OUTPATIENT
Start: 2023-08-03 | End: 2023-08-03 | Stop reason: HOSPADM

## 2023-08-03 RX ORDER — SODIUM CHLORIDE, SODIUM LACTATE, POTASSIUM CHLORIDE, CALCIUM CHLORIDE 600; 310; 30; 20 MG/100ML; MG/100ML; MG/100ML; MG/100ML
100 INJECTION, SOLUTION INTRAVENOUS CONTINUOUS
Status: CANCELLED | OUTPATIENT
Start: 2023-08-03

## 2023-08-03 RX ORDER — PROPOFOL 10 MG/ML
INJECTION, EMULSION INTRAVENOUS AS NEEDED
Status: DISCONTINUED | OUTPATIENT
Start: 2023-08-03 | End: 2023-08-03 | Stop reason: SURG

## 2023-08-03 RX ADMIN — ACETAMINOPHEN 650 MG: 325 TABLET ORAL at 02:16

## 2023-08-03 RX ADMIN — Medication 10 ML: at 21:03

## 2023-08-03 RX ADMIN — GABAPENTIN 100 MG: 100 CAPSULE ORAL at 21:02

## 2023-08-03 RX ADMIN — PANTOPRAZOLE SODIUM 40 MG: 40 TABLET, DELAYED RELEASE ORAL at 17:22

## 2023-08-03 RX ADMIN — Medication 10 ML: at 08:11

## 2023-08-03 RX ADMIN — PROPOFOL 50 MCG/KG/MIN: 10 INJECTION, EMULSION INTRAVENOUS at 14:16

## 2023-08-03 RX ADMIN — PROPOFOL 50 MG: 10 INJECTION, EMULSION INTRAVENOUS at 14:15

## 2023-08-03 RX ADMIN — AMLODIPINE BESYLATE 5 MG: 5 TABLET ORAL at 08:11

## 2023-08-03 RX ADMIN — PROPOFOL 100 MG: 10 INJECTION, EMULSION INTRAVENOUS at 14:12

## 2023-08-03 RX ADMIN — PRAVASTATIN SODIUM 80 MG: 20 TABLET ORAL at 21:02

## 2023-08-03 RX ADMIN — LIDOCAINE HYDROCHLORIDE 50 MG: 20 INJECTION, SOLUTION INFILTRATION; PERINEURAL at 14:12

## 2023-08-03 RX ADMIN — METOPROLOL TARTRATE 12.5 MG: 25 TABLET, FILM COATED ORAL at 08:11

## 2023-08-03 RX ADMIN — PANTOPRAZOLE SODIUM 40 MG: 40 TABLET, DELAYED RELEASE ORAL at 08:11

## 2023-08-03 RX ADMIN — AMIODARONE HYDROCHLORIDE 200 MG: 200 TABLET ORAL at 08:11

## 2023-08-03 RX ADMIN — GABAPENTIN 100 MG: 100 CAPSULE ORAL at 08:11

## 2023-08-03 RX ADMIN — MIRTAZAPINE 15 MG: 15 TABLET, FILM COATED ORAL at 21:02

## 2023-08-03 RX ADMIN — CHOLECALCIFEROL (VITAMIN D3) 10 MCG (400 UNIT) TABLET 400 UNITS: at 21:02

## 2023-08-03 RX ADMIN — METOPROLOL TARTRATE 12.5 MG: 25 TABLET, FILM COATED ORAL at 21:01

## 2023-08-03 RX ADMIN — SODIUM CHLORIDE, POTASSIUM CHLORIDE, SODIUM LACTATE AND CALCIUM CHLORIDE 9 ML/HR: 600; 310; 30; 20 INJECTION, SOLUTION INTRAVENOUS at 14:07

## 2023-08-03 NOTE — PROGRESS NOTES
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

## 2023-08-03 NOTE — SIGNIFICANT NOTE
08/03/23 1351   OTHER   Discipline physical therapist   Rehab Time/Intention   Session Not Performed other (see comments)  (attempted to see x2, pt unavailable with other staff on first attempt and off unit for procedure on 2nd attempt)

## 2023-08-03 NOTE — PLAN OF CARE
Goal Outcome Evaluation:  Plan of Care Reviewed With: patient        Progress: no change  Outcome Evaluation: VSS, tylenol given for c/o hand pain, up with stand by assist, a little restless through the night, NPO this AM awaiting possible scope

## 2023-08-03 NOTE — PROGRESS NOTES
"SERVICE: [facility] HOSPITALIST    CONSULTANTS: Gastroenterology    CHIEF COMPLAINT: Follow-up acute on chronic anemia    SUBJECTIVE: Patient seen and examined at bedside. Patient reports no acute issues overnight.  I discussed plan of care with patient, she expresses she wants to do what ever is necessary to fix her GI bleeding issues once and for all.  Nursing staff reports no acute issues.     OBJECTIVE:    Physical exam is largely unchanged from previous exam, except where documented below, examination is accurate as of 8/3/2023    Physical Exam:  General: Patient is awake and alert.  Elderly female, well developed and well nourished. No acute distress noted.   HENT: Head is atraumatic, normocephalic. Hearing is grossly intact. Nose is without obvious congestion and appears patent. Neck is supple and trachea is midline.   Eyes: Vision is grossly intact. Pupils appear equal and round.   Cardiovascular: Heart has regular rate and rhythm with no murmurs, rubs or gallops noted.   Respiratory: Lungs are clear to ausculation without wheezes, rhonchi or rales.   Abdominal/GI: Soft, nontender, bowel sounds present. No rebound or guarding present.   Extremities: No peripheral edema noted.   Musculoskeletal: Spontaneous movement of bilateral upper and lower extremities against gravity noted. No signs of injury or deformity noted.   Skin: Warm and dry.   Psych: Mood and affect are appropriate. Cooperative with exam.   Neuro: Diffuse tremor noted, continuously moves lips, no facial asymmetry noted. No focal deficits noted, hearing and vision are grossly intact.     /63 (BP Location: Left arm, Patient Position: Lying)   Pulse 71   Temp 98 øF (36.7 øC) (Oral)   Resp 18   Ht 157.5 cm (62\")   Wt 82.6 kg (182 lb)   SpO2 92%   BMI 33.29 kg/mý     MEDS/LABS REVIEWED AND ORDERED    amiodarone, 200 mg, Oral, Daily  amLODIPine, 5 mg, Oral, Daily  cholecalciferol, 400 Units, Oral, BID  ferrous sulfate, " 324 mg, Oral, Daily With Breakfast  gabapentin, 100 mg, Oral, BID  magnesium oxide, 400 mg, Oral, Daily  metoprolol tartrate, 12.5 mg, Oral, Q12H  mirtazapine, 15 mg, Oral, Nightly  pantoprazole, 40 mg, Oral, BID AC  pravastatin, 80 mg, Oral, Nightly  senna-docusate sodium, 2 tablet, Oral, BID  sodium chloride, 10 mL, Intravenous, Q12H          LAB/DIAGNOSTICS:    Lab Results (last 24 hours)       Procedure Component Value Units Date/Time    Basic Metabolic Panel [[id number]]  (Abnormal) Collected: 08/03/23 0549    Specimen: Blood Updated: 08/03/23 0610     Glucose 82 mg/dL      BUN 47 mg/dL      Creatinine 1.57 mg/dL      Sodium 144 mmol/L      Potassium 3.8 mmol/L      Chloride 109 mmol/L      CO2 25.6 mmol/L      Calcium 8.4 mg/dL      BUN/Creatinine Ratio 29.9     Anion Gap 9.4 mmol/L      eGFR 30.8 mL/min/1.73     Narrative:      GFR Normal >60  Chronic Kidney Disease <60  Kidney Failure <15    The GFR formula is only valid for adults with stable renal function between ages 18 and 70.    CBC & Differential [[id number]]  (Abnormal) Collected: 08/03/23 0549    Specimen: Blood Updated: 08/03/23 0559    Narrative:      The following orders were created for panel order CBC & Differential.  Procedure                               Abnormality         Status                     ---------                               -----------         ------                     CBC Auto Differential[[id number]]        Abnormal            Final result                 Please view results for these tests on the individual orders.    CBC Auto Differential [[id number]]  (Abnormal) Collected: 08/03/23 0549    Specimen: Blood Updated: 08/03/23 0559     WBC 7.81 10*3/mm3      RBC 2.52 10*6/mm3      Hemoglobin 7.5 g/dL      Hematocrit 24.8 %      MCV 98.4 fL      MCH 29.8 pg      MCHC 30.2 g/dL      RDW 19.9 %      RDW-SD 68.6 fl      MPV 11.1 fL      Platelets 182 10*3/mm3      Neutrophil % 52.7 %      Lymphocyte % 31.9 %      Monocyte % 9.7 %       Eosinophil % 5.0 %      Basophil % 0.4 %      Immature Grans % 0.3 %      Neutrophils, Absolute 4.12 10*3/mm3      Lymphocytes, Absolute 2.49 10*3/mm3      Monocytes, Absolute 0.76 10*3/mm3      Eosinophils, Absolute 0.39 10*3/mm3      Basophils, Absolute 0.03 10*3/mm3      Immature Grans, Absolute 0.02 10*3/mm3     Folate [[id number]]  (Normal) Collected: 08/02/23 1339    Specimen: Blood Updated: 08/02/23 2054     Folate >20.00 ng/mL     Narrative:      Results may be falsely increased if patient taking Biotin.      Vitamin B12 [[id number]]  (Normal) Collected: 08/02/23 1339    Specimen: Blood Updated: 08/02/23 2054     Vitamin B-12 328 pg/mL     Narrative:      Results may be falsely increased if patient taking Biotin.      Iron Profile [[id number]]  (Abnormal) Collected: 08/02/23 1339    Specimen: Blood Updated: 08/02/23 1557     Iron 42 mcg/dL      Iron Saturation (TSAT) 12 %      TIBC 341 mcg/dL      UIBC 299 mcg/dL     Ferritin [[id number]]  (Normal) Collected: 08/02/23 1339    Specimen: Blood Updated: 08/02/23 1557     Ferritin 53.00 ng/mL     Narrative:      Results may be falsely decreased if patient taking Biotin.      Hemoglobin & Hematocrit, Blood [[id number]]  (Abnormal) Collected: 08/02/23 1339    Specimen: Blood Updated: 08/02/23 1351     Hemoglobin 8.8 g/dL      Hematocrit 27.9 %           ECG 12 Lead ED Triage Standing Order; Weak / Dizzy / AMS   Final Result   HEART RATE= 71  bpm   RR Interval= 848  ms   DE Interval= 193  ms   P Horizontal Axis= 135  deg   P Front Axis= -6  deg   QRSD Interval= 99  ms   QT Interval= 407  ms   QRS Axis= -18  deg   T Wave Axis= 119  deg   - OTHERWISE NORMAL ECG -   Sinus rhythm   Borderline left axis deviation   anterior and lateral t wave inversions   No change from prior tracing   Electronically Signed By: May Fiore ([facility]) 01-Aug-2023 16:10:14   Date and Time of Study: 2023-08-01 13:13:42        Results for orders placed during the hospital encounter of  02/28/18    Adult Transthoracic Echo Complete W/ Cont if Necessary Per Protocol    Interpretation Summary  ú Left atrial cavity size is mildly dilated.  ú There is calcification of the aortic valve.  ú Mild-to-moderate mitral valve regurgitation is present  ú Mild tricuspid valve regurgitation is present.  ú Left Ventricle: Calculated EF = 62.9%.  ú There is no evidence of pericardial effusion    CT Abdomen Pelvis Without Contrast    Result Date: 8/1/2023  1. No clearly acute process in the abdomen or pelvis. No bowel obstruction drainable fluid collection or focal area of inflammatory change. Surgically absent appendix. 2. Uncomplicated cholelithiasis. 3. Probable proteinaceous/hemorrhagic cyst in the posterior lateral left kidney measuring 10 mm. This could be confirmed with renal ultrasound. Additional benign simple cyst arising from the lower pole left kidney requiring no additional follow-up. 4. Leiomyomatous change of uterus.   This report was finalized on 8/1/2023 3:39 PM by Dr. Tristan Cooley MD.      XR Chest 1 View    Result Date: 8/1/2023   1. Cardiomegaly and probable volume overload. Inflammatory/infectious infiltrates felt to be less likely. Follow-up to clearing recommended. No pneumothorax.  This report was finalized on 8/1/2023 2:42 PM by Dr. Tristan Cooley MD.      US Renal Bilateral    Result Date: 8/2/2023   1. No hydronephrosis or shadowing stone on either side. Cortical thinning that is nonspecific but often associated with chronic renal parenchymal disease. 2. The lesion of interest felt to represent a proteinaceous/hemorrhagic cyst in the lateral midpole left kidney is not visible on ultrasound today. See discussion above regarding 6-month follow-up noncontrast CT. 3. Bilateral simple renal cysts corresponding to simple cysts on prior CT.  This report was finalized on 8/2/2023 9:42 AM by Dr. Tristan Cooley MD.         ASSESSMENT/PLAN:  Please note portions of this assessment/plan may have been  "copied and pasted, but I have personally seen this patient and reviewed each line of this assessment and plan for accuracy and made updates to reflect my necessary changes on 8/3/2023    Acute on chronic anemia with apparent GI bleeding  -Hemorrhagic cyst mentioned on CT abdomen pelvis did not show on renal ultrasound  -Patient is status post 3 units packed red blood cell transfusion, hemoglobin improved to above 8, has down trended to 7.5 this morning  -GI now following, plans push enteroscopy  -We will consider IV iron after procedure.  Continue twice daily Protonix.     Elevated creatinine  -Improving, suspect secondary to anemia, treat as above monitor     Elevated troponin  -Down trended in ED, no additional chest pain reported.  Monitor.      Asymptomatic bacteriuria  -Patient continues to be asymptomatic.      Osteoporosis-resume alendronate on discharge     Hypertension-continue amlodipine and metoprolol     Iron deficiency anemia-continue home iron supplement, consider IV as above after procedure, treating anemia otherwise as above     Peripheral neuropathy-continue home gabapentin     Depression and loss of appetite-continue mirtazapine     GERD-utilize Protonix for home PPI substitute     Hyperlipidemia-continue pravastatin     DVT PPX: SCDs      PLAN FOR DISPOSITION: ALEXANDRA Newman DO  Hospitalist, [facility]  08/03/23  07:31 EDT    At [facility], we believe that sharing information builds trust and better relationships. You are receiving this note because you recently visited [facility]. It is possible you will see health information before a provider has talked with you about it. This kind of information can be easy to misunderstand. To help you fully understand what it means for your health, we urge you to discuss this note with your provider.    \"Dictated utilizing Dragon dictation\"    "

## 2023-08-03 NOTE — INTERVAL H&P NOTE
"  H&P reviewed. The patient was examined and there are no changes to the H&P.      /69 (BP Location: Left arm, Patient Position: Lying)   Pulse 68   Temp 98.3 øF (36.8 øC) (Oral)   Resp 16   Ht 157.5 cm (62\")   Wt 82.6 kg (182 lb)   SpO2 94%   BMI 33.29 kg/mý        "

## 2023-08-03 NOTE — PLAN OF CARE
Goal Outcome Evaluation:  Plan of Care Reviewed With: patient        Progress: improving  Outcome Evaluation: vss. Scope today, see notes for results. Pt a&o x4. Possibly discharge tomorrow. Reg diet in place a this time. No other complaints. Up with assist x1/standby with walker.

## 2023-08-04 ENCOUNTER — READMISSION MANAGEMENT (OUTPATIENT)
Dept: CALL CENTER | Facility: HOSPITAL | Age: 88
End: 2023-08-04
Payer: MEDICARE

## 2023-08-04 VITALS
HEIGHT: 62 IN | DIASTOLIC BLOOD PRESSURE: 59 MMHG | WEIGHT: 182 LBS | HEART RATE: 75 BPM | BODY MASS INDEX: 33.49 KG/M2 | SYSTOLIC BLOOD PRESSURE: 125 MMHG | OXYGEN SATURATION: 91 % | RESPIRATION RATE: 16 BRPM | TEMPERATURE: 98.3 F

## 2023-08-04 LAB
ANION GAP SERPL CALCULATED.3IONS-SCNC: 11 MMOL/L (ref 5–15)
BASOPHILS # BLD AUTO: 0.04 10*3/MM3 (ref 0–0.2)
BASOPHILS NFR BLD AUTO: 0.4 % (ref 0–1.5)
BUN SERPL-MCNC: 32 MG/DL (ref 8–23)
BUN/CREAT SERPL: 21.2 (ref 7–25)
CALCIUM SPEC-SCNC: 7.9 MG/DL (ref 8.2–9.6)
CHLORIDE SERPL-SCNC: 108 MMOL/L (ref 98–107)
CO2 SERPL-SCNC: 24 MMOL/L (ref 22–29)
CREAT SERPL-MCNC: 1.51 MG/DL (ref 0.57–1)
DEPRECATED RDW RBC AUTO: 67.7 FL (ref 37–54)
EGFRCR SERPLBLD CKD-EPI 2021: 32.3 ML/MIN/1.73
EOSINOPHIL # BLD AUTO: 0.28 10*3/MM3 (ref 0–0.4)
EOSINOPHIL NFR BLD AUTO: 2.7 % (ref 0.3–6.2)
ERYTHROCYTE [DISTWIDTH] IN BLOOD BY AUTOMATED COUNT: 19.7 % (ref 12.3–15.4)
GLUCOSE SERPL-MCNC: 94 MG/DL (ref 65–99)
HCT VFR BLD AUTO: 23.7 % (ref 34–46.6)
HGB BLD-MCNC: 7.1 G/DL (ref 12–15.9)
IMM GRANULOCYTES # BLD AUTO: 0.05 10*3/MM3 (ref 0–0.05)
IMM GRANULOCYTES NFR BLD AUTO: 0.5 % (ref 0–0.5)
LYMPHOCYTES # BLD AUTO: 1.59 10*3/MM3 (ref 0.7–3.1)
LYMPHOCYTES NFR BLD AUTO: 15.4 % (ref 19.6–45.3)
MCH RBC QN AUTO: 29.7 PG (ref 26.6–33)
MCHC RBC AUTO-ENTMCNC: 30 G/DL (ref 31.5–35.7)
MCV RBC AUTO: 99.2 FL (ref 79–97)
MONOCYTES # BLD AUTO: 0.95 10*3/MM3 (ref 0.1–0.9)
MONOCYTES NFR BLD AUTO: 9.2 % (ref 5–12)
NEUTROPHILS NFR BLD AUTO: 7.4 10*3/MM3 (ref 1.7–7)
NEUTROPHILS NFR BLD AUTO: 71.8 % (ref 42.7–76)
NRBC BLD AUTO-RTO: 0 /100 WBC (ref 0–0.2)
PLATELET # BLD AUTO: 198 10*3/MM3 (ref 140–450)
PMV BLD AUTO: 11.4 FL (ref 6–12)
POTASSIUM SERPL-SCNC: 3.8 MMOL/L (ref 3.5–5.2)
RBC # BLD AUTO: 2.39 10*6/MM3 (ref 3.77–5.28)
SODIUM SERPL-SCNC: 143 MMOL/L (ref 136–145)
WBC NRBC COR # BLD: 10.31 10*3/MM3 (ref 3.4–10.8)

## 2023-08-04 PROCEDURE — 25010000002 NA FERRIC GLUC CPLX PER 12.5 MG: Performed by: INTERNAL MEDICINE

## 2023-08-04 PROCEDURE — 99232 SBSQ HOSP IP/OBS MODERATE 35: CPT | Performed by: STUDENT IN AN ORGANIZED HEALTH CARE EDUCATION/TRAINING PROGRAM

## 2023-08-04 PROCEDURE — 99239 HOSP IP/OBS DSCHRG MGMT >30: CPT | Performed by: INTERNAL MEDICINE

## 2023-08-04 PROCEDURE — 80048 BASIC METABOLIC PNL TOTAL CA: CPT | Performed by: STUDENT IN AN ORGANIZED HEALTH CARE EDUCATION/TRAINING PROGRAM

## 2023-08-04 PROCEDURE — 97161 PT EVAL LOW COMPLEX 20 MIN: CPT

## 2023-08-04 PROCEDURE — 94761 N-INVAS EAR/PLS OXIMETRY MLT: CPT

## 2023-08-04 PROCEDURE — 85025 COMPLETE CBC W/AUTO DIFF WBC: CPT | Performed by: STUDENT IN AN ORGANIZED HEALTH CARE EDUCATION/TRAINING PROGRAM

## 2023-08-04 RX ORDER — ACETAMINOPHEN 325 MG/1
500 TABLET ORAL EVERY 4 HOURS PRN
Start: 2023-08-04

## 2023-08-04 RX ADMIN — AMIODARONE HYDROCHLORIDE 200 MG: 200 TABLET ORAL at 09:52

## 2023-08-04 RX ADMIN — Medication 400 MG: at 09:51

## 2023-08-04 RX ADMIN — AMLODIPINE BESYLATE 5 MG: 5 TABLET ORAL at 09:50

## 2023-08-04 RX ADMIN — METOPROLOL TARTRATE 12.5 MG: 25 TABLET, FILM COATED ORAL at 09:51

## 2023-08-04 RX ADMIN — CHOLECALCIFEROL (VITAMIN D3) 10 MCG (400 UNIT) TABLET 400 UNITS: at 09:50

## 2023-08-04 RX ADMIN — Medication 10 ML: at 09:50

## 2023-08-04 RX ADMIN — FERROUS SULFATE TAB EC 324 MG (65 MG FE EQUIVALENT) 324 MG: 324 (65 FE) TABLET DELAYED RESPONSE at 09:52

## 2023-08-04 RX ADMIN — SODIUM CHLORIDE 125 MG: 9 INJECTION, SOLUTION INTRAVENOUS at 09:49

## 2023-08-04 RX ADMIN — GABAPENTIN 100 MG: 100 CAPSULE ORAL at 09:50

## 2023-08-04 RX ADMIN — SENNOSIDES AND DOCUSATE SODIUM 2 TABLET: 50; 8.6 TABLET ORAL at 09:50

## 2023-08-04 RX ADMIN — PANTOPRAZOLE SODIUM 40 MG: 40 TABLET, DELAYED RELEASE ORAL at 09:50

## 2023-08-04 NOTE — CASE MANAGEMENT/SOCIAL WORK
Case Management Discharge Note      Final Note: DC home with Home Health.    Provided Post Acute Provider List?: Refused  Refused Provider List Comment: pt declined    Selected Continued Care - Admitted Since 8/1/2023       Destination    No services have been selected for the patient.                Durable Medical Equipment    No services have been selected for the patient.                Dialysis/Infusion    No services have been selected for the patient.                Home Medical Care    No services have been selected for the patient.                Therapy    No services have been selected for the patient.                Community Resources    No services have been selected for the patient.                Community & DME    No services have been selected for the patient.                    Selected Continued Care - Prior Encounters Includes continued care and service providers with selected services from prior encounters from 5/3/2023 to 8/4/2023      Discharged on 6/22/2023 Admission date: 6/18/2023 - Discharge disposition: Home-Health Care [facility]      Home Medical Care       Service Provider Selected Services Address Phone Fax Patient Preferred    [facility] [address] [place] [phone] --                               Final Discharge Disposition Code: 01 - home or self-care

## 2023-08-04 NOTE — PROGRESS NOTES
"Enter Query Response Below      Query Response: Stage 3b CKD with elevated creatinine only   Electronically signed by Fili Gillespie DO, 23, 12:20 PM EDT.               If applicable, please update the problem list.   Patient: Aria Fernando \"[place]\"        : 1930  Account: [id number]           Admit Date:         How to Respond to this query:       a. Click New Note     b. Answer query within the yellow box.                c. Update the Problem List, if applicable.      If you have any questions about this query contact me at: [email or web address]      Dr. Gillespie,     89-year female patient with PMHx that includes HTN, Diastolic CHF, Stage 3b CKD admitted 23 with Acute on Chronic Anemia, Elevated Creatinine.     H&P states, \" Elevated creatinine  -Suspect secondary to hypovolemia with active bleeding  -Creatinine at baseline appears to be 1.2-1.3, currently 1.82 \"     DC Summary states, \"  Elevated creatinine on CKD IIIb  -Improving, and now closer to her baseline\"     Labs: 23  Creatinine 1.82     Creatinine  1.62   Creatinine  1.57   Creatinine  1.51     Treatment has included Normal Saline 5ml IV bolus, Normal Saline at 125ml/hr, 3 Units PRBC, IV Ferrlecit x1,  Ferrous Sulfate by mouth.      Please clarify if the patient treated/monitored for one or more of the following:   > Acute kidney injury (MARTA) on CKD stage 3b  > Stage 3b CKD  with elevated creatinine only   > Other- specify: __________  > Unable to determine     MARTA is defined by KDIGO as any of the following:  Increase in creatinine > 0.3 mg/dl within 48 hours or less; or    Increase in creatinine level to > 1.5x baseline (historical or measure), which is known or presumed to have occurred within the prior 7 days; or    Urine volume <0.5 ml/kg/h for 6 hours.  Reference: www. KIDGO.org    By submitting this query, we are merely seeking further clarification of documentation to accurately reflect all conditions " that you are monitoring, evaluating, treating or that extend the hospitalization or utilize additional resources of care. Please utilize your independent clinical judgment when addressing the question(s) above.     This query and your response, once completed, will be entered into the legal medical record.    Sincerely,  Loni Sharma RN, [facility]  Clinical Documentation Integrity Program   [email or web address]

## 2023-08-04 NOTE — DISCHARGE PLACEMENT REQUEST
"AbdullahiJimirena MCMILLAN \"[place]\" (89 y.o. Female)       Date of Birth   09/27/1930    Social Security Number       Address   [address] [facility] [address]    Home Phone   [phone]    MRN   [id number]       [facility]    Marital Status   Single                            Admission Date   8/1/23    Admission Type   Emergency    Admitting Provider   Alberto Newman DO    Attending Provider   Alberto Newman DO    Department, Room/Bed   [facility] MED SURG, 1408/1       Discharge Date       Discharge Disposition   [place]-Health Care [facility]    Discharge Destination                                 Attending Provider: Alberto Newman DO    Allergies: Clinoril [Sulindac], Codeine, Coumadin [Warfarin Sodium], Ibuprofen-oxycodone [Oxycodone-ibuprofen], Keflex [Cephalexin], Mydriacyl [Tropicamide], Don-synephrine [Phenylephrine], Penicillins, Phenylephrine Hcl (Pressors), Sulfa Antibiotics, Zantac [Ranitidine Hcl]    Isolation: None   Infection: None   Code Status: CPR    Ht: 157.5 cm (62\")   Wt: 82.6 kg (182 lb)    Admission Cmt: None   Principal Problem: Acute on chronic anemia [D64.9]                   Active Insurance as of 8/1/2023       Primary Coverage       Payor Plan Insurance Group Employer/Plan Group    [facility] MEDICARE REPLACEMENT [employer] MEDICARE REPLACEMENT        Payor Plan Address Payor Plan Phone Number Payor Plan Fax Number Effective Dates    [address] [address] [phone]  5/25/2022 - None Entered    [facility] [address]         Subscriber Name Subscriber Birth Date Member ID       ARIA CONNELL DEYANIRA 9/27/1930 [id number]               Secondary Coverage       Payor Plan Insurance Group Employer/Plan Group    [place]        Payor Plan Address Payor Plan Phone Number Payor Plan Fax Number Effective Dates    [address] [phone]  5/1/2022 - None Entered    [facility] [id number]         Subscriber Name Subscriber Birth Date Member ID "       VETO CONNELL 9/27/1930 [id number]                     Emergency Contacts        (Rel.) Home Phone Work Phone Mobile Phone    Ina Goodman (Power of ) -- -- [phone]

## 2023-08-04 NOTE — PROGRESS NOTES
GI Daily Progress Note       LOS: 3 days       Subjective:    - Push enteroscopy yesterday with no bleeding lesions or evidence of recent bleeding.   - Had 2 bowel movements yesterday with no overt bleeding witnessed.   - Hgb stable.     Objective:    Vital signs in last 24 hours:  Temp:  [96.6 øF (35.9 øC)-98.3 øF (36.8 øC)] 98.3 øF (36.8 øC)  Heart Rate:  [65-78] 74  Resp:  [10-22] 16  BP: (100-130)/(56-71) 125/59    Intake/Output last 3 shifts:  I/O last 3 completed shifts:  In: 420 [P.O.:320; I.V.:100]  Out: 900 [Urine:900]  Intake/Output this shift:  No intake/output data recorded.    Physical Exam:Abdomen  Sounds Normal Active Bowel Sounds   Distension Soft   Tenderness Nontender     Imaging Results (Last 72 Hours)       Procedure Component Value Units Date/Time    US Renal Bilateral [[id number]] Collected: 08/02/23 0834     Updated: 08/02/23 0944    Narrative:      BILATERAL RENAL ULTRASOUND, 8/2/2023     HISTORY:  Abnormal CT yesterday demonstrating probable complex cyst in the  posterolateral left kidney measuring 10 mm. Ultrasound requested for  further evaluation and to exclude solid mass.     TECHNIQUE:  Grayscale ultrasound imaging of both kidneys and the urinary bladder was  performed. Correlation made with abdomen and pelvis CT 8/1/2023.     FINDINGS:  Bladder decompressed and not well visualized or assessed. The right  kidney is nonobstructed and measures approximately 9.7 cm. No shadowing  stone. There is cortical thinning. There is a simple cyst laterally  measuring 1.2 cm corresponding with a simple cyst on prior CT. The left  kidney is nonobstructed and also demonstrates cortical thinning. Left  kidney measures 11.1 cm. No shadowing stone. There is a simple cyst in  the lateral lower pole left kidney measuring 1.3 cm corresponding to a  simple cyst on the prior CT. The lesion of interest in the lateral mid  to lower pole left kidney measuring 10 mm is not visible on ultrasound  imaging today.  Given patient age category and diminished renal function,  rather than proceeding with further evaluation with dedicated renal  protocol CT, consider 6-month follow-up noncontrast CT to reassess  stability of the probable proteinaceous cyst on the prior CT study.       Impression:         1. No hydronephrosis or shadowing stone on either side. Cortical  thinning that is nonspecific but often associated with chronic renal  parenchymal disease.  2. The lesion of interest felt to represent a proteinaceous/hemorrhagic  cyst in the lateral midpole left kidney is not visible on ultrasound  today. See discussion above regarding 6-month follow-up noncontrast CT.  3. Bilateral simple renal cysts corresponding to simple cysts on prior  CT.     This report was finalized on 8/2/2023 9:42 AM by Dr. Tristan Cooley MD.       CT Abdomen Pelvis Without Contrast [[id number]] Collected: 08/01/23 1431     Updated: 08/01/23 1541    Narrative:      CT ABDOMEN AND PELVIS WITHOUT CONTRAST 8/1/2023     HISTORY:  89-year-old female with generalized weakness and shortness of air. Low  hemoglobin and red blood cell count.     TECHNIQUE:   Noncontrast CT of the abdomen and pelvis was performed. Sagittal and  coronal reformats performed. No comparisons. Radiation dose reduction  techniques included automated exposure control or exposure modulation  based on body size. Radiation audit for CT and nuclear cardiology exams  in the last 12 months: 0.     ABDOMEN FINDINGS:   Included lung bases show areas of atelectasis. Old healed granulomatous  disease. The heart is enlarged. There is no effusion. Normal caliber  aorta with advanced atherosclerotic change. The spleen and adrenal  glands are negative and the pancreas is negative. Uncomplicated  cholelithiasis. Liver and spleen both show granulomatous changes. The  kidneys are nonobstructed. There is atrophy and cortical scarring  bilaterally. Probable faint renal vascular calcifications on the  left.  No distinct ureteral stone. In the lateral midpole left kidney there is  a mildly hyperdense exophytic lesion probably representing a hemorrhagic  cyst and measuring 10 mm. This could be confirmed with nonemergent renal  ultrasound. There is no threshold adenopathy.     PELVIS FINDINGS:   Leiomyomatous change of the uterus. No drainable fluid collection.  Negative bladder. There is no bowel obstruction or focal inflammatory  change of the bowel. Negative terminal ileum. Appendix surgically absent  by history. Inguinal canals are negative. There are advanced  degenerative changes in the lower lumbar spine.  negative.       Impression:      1. No clearly acute process in the abdomen or pelvis. No bowel  obstruction drainable fluid collection or focal area of inflammatory  change. Surgically absent appendix.  2. Uncomplicated cholelithiasis.  3. Probable proteinaceous/hemorrhagic cyst in the posterior lateral left  kidney measuring 10 mm. This could be confirmed with renal ultrasound.  Additional benign simple cyst arising from the lower pole left kidney  requiring no additional follow-up.  4. Leiomyomatous change of uterus.        This report was finalized on 8/1/2023 3:39 PM by Dr. Tristan Cooley MD.       XR Chest 1 View [[id number]] Collected: 08/01/23 1341     Updated: 08/01/23 1444    Narrative:      XR CHEST 1 VW-: 8/1/2023 3:29 PM     INDICATION:   Short of air 2 days. HISTORY of congestive heart failure hypertension  and coronary artery disease. Former smoker.     COMPARISON:    6/18/2023.     FINDINGS:  Single Portable AP view(s) of the chest. Cardiac silhouette is enlarged.  The aortic knob is prominent and atherosclerotic, unchanged.  Interstitial prominence and cephalization most characteristic of mild  volume overload with probable mild perihilar edema. There is no dense  consolidation or effusion. No pneumothorax. There is thoracic  spondylosis.       Impression:         1. Cardiomegaly and  probable volume overload. Inflammatory/infectious  infiltrates felt to be less likely. Follow-up to clearing recommended.  No pneumothorax.     This report was finalized on 8/1/2023 2:42 PM by Dr. Tristan Cooley MD.               WBC   Date Value Ref Range Status   08/04/2023 10.31 3.40 - 10.80 10*3/mm3 Final     RBC   Date Value Ref Range Status   08/04/2023 2.39 (L) 3.77 - 5.28 10*6/mm3 Final     Hemoglobin   Date Value Ref Range Status   08/04/2023 7.1 (L) 12.0 - 15.9 g/dL Final     Hematocrit   Date Value Ref Range Status   08/04/2023 23.7 (L) 34.0 - 46.6 % Final     MCV   Date Value Ref Range Status   08/04/2023 99.2 (H) 79.0 - 97.0 fL Final     MCH   Date Value Ref Range Status   08/04/2023 29.7 26.6 - 33.0 pg Final     MCHC   Date Value Ref Range Status   08/04/2023 30.0 (L) 31.5 - 35.7 g/dL Final     RDW   Date Value Ref Range Status   08/04/2023 19.7 (H) 12.3 - 15.4 % Final     RDW-SD   Date Value Ref Range Status   08/04/2023 67.7 (H) 37.0 - 54.0 fl Final     MPV   Date Value Ref Range Status   08/04/2023 11.4 6.0 - 12.0 fL Final     Platelets   Date Value Ref Range Status   08/04/2023 198 140 - 450 10*3/mm3 Final     Neutrophil %   Date Value Ref Range Status   08/04/2023 71.8 42.7 - 76.0 % Final     Lymphocyte %   Date Value Ref Range Status   08/04/2023 15.4 (L) 19.6 - 45.3 % Final     Monocyte %   Date Value Ref Range Status   08/04/2023 9.2 5.0 - 12.0 % Final     Eosinophil %   Date Value Ref Range Status   08/04/2023 2.7 0.3 - 6.2 % Final     Basophil %   Date Value Ref Range Status   08/04/2023 0.4 0.0 - 1.5 % Final     Immature Grans %   Date Value Ref Range Status   08/04/2023 0.5 0.0 - 0.5 % Final     Neutrophils, Absolute   Date Value Ref Range Status   08/04/2023 7.40 (H) 1.70 - 7.00 10*3/mm3 Final     Lymphocytes, Absolute   Date Value Ref Range Status   08/04/2023 1.59 0.70 - 3.10 10*3/mm3 Final     Monocytes, Absolute   Date Value Ref Range Status   08/04/2023 0.95 (H) 0.10 - 0.90 10*3/mm3  Final     Eosinophils, Absolute   Date Value Ref Range Status   08/04/2023 0.28 0.00 - 0.40 10*3/mm3 Final     Basophils, Absolute   Date Value Ref Range Status   08/04/2023 0.04 0.00 - 0.20 10*3/mm3 Final     Immature Grans, Absolute   Date Value Ref Range Status   08/04/2023 0.05 0.00 - 0.05 10*3/mm3 Final     nRBC   Date Value Ref Range Status   08/04/2023 0.0 0.0 - 0.2 /100 WBC Final       Glucose   Date Value Ref Range Status   08/04/2023 94 65 - 99 mg/dL Final     Sodium   Date Value Ref Range Status   08/04/2023 143 136 - 145 mmol/L Final     Potassium   Date Value Ref Range Status   08/04/2023 3.8 3.5 - 5.2 mmol/L Final     CO2   Date Value Ref Range Status   08/04/2023 24.0 22.0 - 29.0 mmol/L Final     Chloride   Date Value Ref Range Status   08/04/2023 108 (H) 98 - 107 mmol/L Final     Anion Gap   Date Value Ref Range Status   08/04/2023 11.0 5.0 - 15.0 mmol/L Final     Creatinine   Date Value Ref Range Status   08/04/2023 1.51 (H) 0.57 - 1.00 mg/dL Final     BUN   Date Value Ref Range Status   08/04/2023 32 (H) 8 - 23 mg/dL Final     BUN/Creatinine Ratio   Date Value Ref Range Status   08/04/2023 21.2 7.0 - 25.0 Final     Calcium   Date Value Ref Range Status   08/04/2023 7.9 (L) 8.2 - 9.6 mg/dL Final     Alkaline Phosphatase   Date Value Ref Range Status   08/01/2023 64 39 - 117 U/L Final     Total Protein   Date Value Ref Range Status   08/01/2023 6.0 6.0 - 8.5 g/dL Final     ALT (SGPT)   Date Value Ref Range Status   08/01/2023 8 1 - 33 U/L Final     AST (SGOT)   Date Value Ref Range Status   08/01/2023 14 1 - 32 U/L Final     Total Bilirubin   Date Value Ref Range Status   08/01/2023 0.3 0.0 - 1.2 mg/dL Final     Albumin   Date Value Ref Range Status   08/01/2023 4.0 3.5 - 5.2 g/dL Final     Globulin   Date Value Ref Range Status   08/01/2023 2.0 gm/dL Final       Problem List:      Acute on chronic anemia      Assessment and Plan     89 yo F with PMH of AVMs of colon s/p APC, CAD, HFpEF, PAD, A-Fib,  JHOANA who initially presented on 8/1 with exertional dyspnea ongoing since 7/31. Admitted for symptomatic anemia with admission Hgb of 6.1, baseline Hgb ~8. GI consulted for further evaluation.      # Acute on Chronic Anemia  # Symptomatic Anemia   # Iron Deficiency Anemia   - Endorses black colored stools which she attributes to PO iron   - Appropriate rise in Hgb following 3 pRBC transfusions since admission   - EGD in 6/2023: gastritis and esophagitis.   - Colonoscopy in 6/2023: two small AVMs in cecum and ascending colon s/p APC and multiple sub-cm polyps s/p removal.   - Admission iron studies: T Sat 12% c/w JHOANA   - Push enteroscopy on 8/3: advanced to mid jejunum with no bleeding lesion or stigmata of recent bleeding seen.  Plan:   - Ok to discontinue PPI BID given negative endoscopy findings   - Agree with IV iron to replete iron stores while inpatient  - No further plans for endoscopic evaluation given no overt bleeding and stable Hgb

## 2023-08-04 NOTE — THERAPY DISCHARGE NOTE
Patient Name: Aria Fernando  : 1930    MRN: [id number]                              Today's Date: 2023       Admit Date: 2023    Visit Dx:     ICD-10-CM ICD-9-CM   1. Acute on chronic anemia  D64.9 285.9     Patient Active Problem List   Diagnosis    Anemia due to blood loss    Lower GI bleed    Atrial fibrillation    Anemia due to blood loss, acute    Essential hypertension, benign    Hemorrhagic disorder due to extrinsic circulating anticoagulants    Acute on chronic diastolic congestive heart failure    Hematoma    Mesenteric venous thrombosis    H/O amiodarone therapy    Anticoagulated    Preop cardiovascular exam    Occult GI bleeding    Gastroesophageal reflux disease with esophagitis without hemorrhage    Angiodysplasia    CKD (chronic kidney disease)    Anemia due to GI blood loss    Acute on chronic anemia     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     On anticoagulant therapy     Osteoarthritis     Polycythemia     PVD (peripheral vascular disease)      Past Surgical History:   Procedure Laterality Date    APPENDECTOMY      CAROTID ENDARTERECTOMY Left     COLONOSCOPY N/A 2018    Procedure:  Colonoscopy to Terminal ileum with APC treatment for 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 and cold biopsy;  Surgeon: Terrie Carroll MD;  Location: [facility] ENDOSCOPY;  Service:     COLONOSCOPY W/ POLYPECTOMY N/A 2023    Procedure: COLONOSCOPY WITH POLYPECTOMY AND APC;  Surgeon: Emiliano Rodriguez MD;  Location: [facility] OR;  Service: Gastroenterology;  Laterality: N/A;  transverse polyp-forcep  cecal time 1659  APC  DESCENDING POLYP  SIGMOID POLYP    ENDOSCOPY N/A 2018    Procedure: EGD with  biopsy;  Surgeon: Terrie Carroll MD;  Location: [facility] ENDOSCOPY;  Service:     ENDOSCOPY N/A 6/19/2023    Procedure: ESOPHAGOGASTRODUODENOSCOPY;  Surgeon: Emiliano Rodriguez MD;  Location: [facility] OR;  Service: Gastroenterology;  Laterality: N/A;  Reflux, Gastritis    KNEE ARTHROSCOPY Right       General Information       Row Name 08/04/23 0816          Physical Therapy Time and Intention    Document Type discharge evaluation/summary  -     Mode of Treatment physical therapy  -       Row Name 08/04/23 0816          General Information    Patient Profile Reviewed yes  pt admitted with acute on chronic anemia  -     Existing Precautions/Restrictions fall  -     Barriers to Rehab none identified  -       Row Name 08/04/23 0816          Living Environment    People in Home other (see comments)  pt lives in assisted living  -       Row Name 08/04/23 0816          Home Main Entrance    Number of Stairs, Main Entrance none  -       Row Name 08/04/23 0816          Stairs Within Home, Primary    Number of Stairs, Within Home, Primary none  -               User Key  (r) = Recorded By, (t) = Taken By, (c) = Cosigned By      Initials Name Provider Type     Jennie Beaulieu, PT Physical Therapist                   Mobility       Row Name 08/04/23 0816          Bed Mobility    Bed Mobility other (see comments)  deferred up in recliner  -       Row Name 08/04/23 0816          Sit-Stand Transfer    Sit-Stand [place] (Transfers) standby assist  -     Assistive Device (Sit-Stand Transfers) walker, front-wheeled  -       Row Name 08/04/23 0816          Gait/Stairs (Locomotion)    [place] Level (Gait) standby assist  -     Assistive Device (Gait) walker, front-wheeled  -     Distance in Feet (Gait) 175  -     Bilateral Gait Deviations forward flexed posture  -     Comment, (Gait/Stairs) pt manages direction changes without difficulty or balance loss  -               User Key  (r) =  Recorded By, (t) = Taken By, (c) = Cosigned By      Initials Name Provider Type    Jennie Gonzales, DAISY Physical Therapist                   Obj/Interventions       [facility] Name 08/04/23 0816          Range of Motion Comprehensive    Comment, General Range of Motion LE ROM functional within task  -[facility] Name 08/04/23 0816          Strength Comprehensive (MMT)    Comment, General Manual Muscle Testing (MMT) Assessment LE strength functional within task  -[facility] Name 08/04/23 0816          Balance    Comment, Balance SBA for standing balance with walker  -[facility] Name 08/04/23 0816          Sensory Assessment (Somatosensory)    Sensory Assessment (Somatosensory) other (see comments)  pt reports numbness in right UE, reports this is pre-existing  -               User Key  (r) = Recorded By, (t) = Taken By, (c) = Cosigned By      Initials Name Provider Type    Jennie Gonzales, DAISY Physical Therapist                   Goals/Plan    No documentation.                  Clinical Impression       [facility] Name 08/04/23 0816          Pain    Pretreatment Pain Rating 0/10 - no pain  -     Posttreatment Pain Rating 0/10 - no pain  -[facility] Name 08/04/23 0816          Plan of Care Review    Plan of Care Reviewed With patient  -     Outcome Evaluation Physical therapy evaluation complete.  Patient reports she has had no difficulty with mobility since being in hospital and is agreeable to evaluation with encouragement.  Patient performs sit to stand with SBA and gait x175 fet with SBA.  Patient manages direction changes without difficulty and avoids obstacles without balance loss.  Patient reports she is independent with all exercises and has no concerns regarding mobility at this time.  No further PT needs in acute care setting, recommend resuming home health services upon return to assisted living facility.  -       Row Name 08/04/23 0816          Therapy Assessment/Plan (PT)    Patient/Family Therapy  "Goals Statement (PT) \"go home\"  -     Criteria for Skilled Interventions Met (PT) no problems identified which require skilled intervention  -     Therapy Frequency (PT) evaluation only  -       Row Name 08/04/23 0816          Positioning and Restraints    Pre-Treatment Position sitting in chair/recliner  -     Post Treatment Position chair  -JW     In Chair notified nsg;reclined;call light within reach;with family/caregiver;encouraged to call for assist  -               User Key  (r) = Recorded By, (t) = Taken By, (c) = Cosigned By      Initials Name Provider Type    Jennie Gonzales, DAISY Physical Therapist                   Outcome Measures       Row Name 08/04/23 0816          How much help from another person do you currently need...    Turning from your back to your side while in flat bed without using bedrails? 4  -JW     Moving from lying on back to sitting on the side of a flat bed without bedrails? 4  -JW     Moving to and from a bed to a chair (including a wheelchair)? 3  -JW     Standing up from a chair using your arms (e.g., wheelchair, bedside chair)? 3  -JW     Climbing 3-5 steps with a railing? 3  -JW     To walk in hospital room? 3  -JW     AM-PAC 6 Clicks Score (PT) 20  -JW     Highest level of mobility 6 --> Walked 10 steps or more  -       Row Name 08/04/23 0816          Functional Assessment    Outcome Measure Options AM-PAC 6 Clicks Basic Mobility (PT)  -               User Key  (r) = Recorded By, (t) = Taken By, (c) = Cosigned By      Initials Name Provider Type    Jennie Gonzales, DAISY Physical Therapist                  Physical Therapy Education       Title: PT OT SLP Therapies (Done)       Topic: Physical Therapy (Done)       Point: Mobility training (Done)       Learning Progress Summary             Patient Acceptance, E,TB, VU by MORELIA at 8/4/2023 1034                                         User Key       Initials Effective Dates Name Provider Type William     06/16/21 " -  Jennie Beaulieu, PT Physical Therapist PT                  PT Recommendation and Plan     Plan of Care Reviewed With: patient  Outcome Evaluation: Physical therapy evaluation complete.  Patient reports she has had no difficulty with mobility since being in hospital and is agreeable to evaluation with encouragement.  Patient performs sit to stand with SBA and gait x175 fet with SBA.  Patient manages direction changes without difficulty and avoids obstacles without balance loss.  Patient reports she is independent with all exercises and has no concerns regarding mobility at this time.  No further PT needs in acute care setting, recommend resuming home health services upon return to assisted living facility.     Time Calculation:   PT Evaluation Complexity  History, PT Evaluation Complexity: 1-2 personal factors and/or comorbidities  Examination of Body Systems (PT Eval Complexity): 1-2 elements  Clinical Presentation (PT Evaluation Complexity): stable  Clinical Decision Making (PT Evaluation Complexity): low complexity  Overall Complexity (PT Evaluation Complexity): low complexity     PT Charges       Row Name 08/04/23 1034             Time Calculation    Start Time 0816  -      Stop Time 0830  -      Time Calculation (min) 14 min  -      PT Received On 08/04/23  -                User Key  (r) = Recorded By, (t) = Taken By, (c) = Cosigned By      Initials Name Provider Type    Jennie Gonzales, PT Physical Therapist                  Therapy Charges for Today       Code Description Service Date Service Provider Modifiers Qty    [id number]  PT EVAL LOW COMPLEXITY 1 8/4/2023 Jennie Beaulieu, PT GP 1            PT G-Codes  Outcome Measure Options: AM-PAC 6 Clicks Basic Mobility (PT)  AM-PAC 6 Clicks Score (PT): 20    PT Discharge Summary  Anticipated Discharge Disposition (PT): home with home health    Jennie Beaulieu PT  8/4/2023

## 2023-08-04 NOTE — PROGRESS NOTES
"Enter Query Response Below      Query Response: Acute on Chronic Anemia due to Iron Deficiency   Electronically signed by Fili Gillespie DO, 23, 12:26 PM EDT.               If applicable, please update the problem list.   Patient: Aria Fernando \"Titus\"        : 1930  Account: [id number]           Admit Date:         How to Respond to this query:       a. Click New Note     b. Answer query within the yellow box.                c. Update the Problem List, if applicable.      If you have any questions about this query contact me at: [email or web address]      Dr. Gillespie,     89-year female patient with PMHx that includes HTN,CHF, Stage 3b CKD, Fe Deficiency, Gastritis, Esophagitis,  2 small AVM's, Colon Polyps admitted 23 with Acute on Chronic Anemia, Elevated Creatinine.    H&P states, \"Admitted for symptomatic anemia with admission Hgb of 6.1, baseline Hgb ~8.\"     DC Summary states, \"  Acute on chronic anemia -GI bleeding ruled out, .....Likely related to known Fe deficicy, ...\"     Labs: 23   HGB  6.1  HCT  21.0  :  HGB 7.2  HCT 23.0   : HGB  7.5  HCT 24.8   : HGB 7.1  HCT 23.7     Treatment has included Normal Saline 5ml IV bolus, Normal Saline at 125ml/hr, 3 Units PRBC, IV Ferrlecit x1,  Ferrous Sulfate by mouth.      Please clarify if patient treated/monitored for one or more of the following:  > Acute Blood Loss Anemia on Chronic Blood Loss Anemia due to Iron Deficiency  > Acute on Chronic Anemia due to Iron Deficiency  > Other- specify_______  > Unable to determine    By submitting this query, we are merely seeking further clarification of documentation to accurately reflect all conditions that you are monitoring, evaluating, treating or that extend the hospitalization or utilize additional resources of care. Please utilize your independent clinical judgment when addressing the question(s) above.     This query and your response, once completed, will be entered " into the legal medical record.    Sincerely,  Loni Sharma RN, CCDS  Clinical Documentation Integrity Program   [email or web address]

## 2023-08-04 NOTE — CASE MANAGEMENT/SOCIAL WORK
Continued Stay Note  KALA Lu     Patient Name: Aria Fernando  MRN: [id number]  Today's Date: 8/4/2023    Admit Date: 8/1/2023    Plan: return to [facility] w/HH   Discharge Plan       Row Name 08/04/23 1044       Plan    Plan return to [facility] w/HH    Plan Comments CCP called pts Ina LOMBARDO, introduced myself, role and discussed dc plans, Ina said she will come get pt and take her back to assisted living.  I called [facility] living and spoke to Leanna, she said all they need is the dc summary sent with pt on dc.  CCP spoke to MD regarding resuming HH and he is in agreement.  CCP called Roxie with [facility] with pt dc.  CCP will follow. Thanks, Erinn GIL    Final Discharge Disposition Code 01 - home or self-care    Final Note DC home with Home Health.                   Discharge Codes    No documentation.                 Expected Discharge Date and Time       Expected Discharge Date Expected Discharge Time    Aug 4, 2023               Erinn Beckman

## 2023-08-04 NOTE — DISCHARGE SUMMARY
Date of Admission: 8/1/2023    Date of Discharge:  8/4/2023    Length of stay:  LOS: 3 days     Presenting Problem:   Acute on chronic anemia [D64.9]      Active Diagnosis During Hospital Stay/Discharge Diagnoses/Course by Diagnoses:    Acute on chronic anemia   -GI bleeding ruled out, did endorse dark-colored stools but she attributed that to p.o. iron and had brown bowel movements while here.  Likely related to known Fe deficicy, repleted Fe stores with IV iron while here  -baseline hgb ~8, overall hgb stable (did have 8.8 level but suspect this was hemoconcentrated after blood transfusion)  -Push enteroscopy on 8/3: advanced to mid jejunum with no bleeding lesion or stigmata of recent bleeding seen.   -GI now followed  -no need for BID ppi per GI     Elevated creatinine on CKD IIIb  -Improving, and now closer to her baseline     Elevated troponin  -Down trended in ED, no additional chest pain reported.  Monitor.      Asymptomatic bacteriuria  -Patient continues to be asymptomatic.      Osteoporosis-resume alendronate on discharge     Hypertension-continue amlodipine and metoprolol     Iron deficiency anemia-contribyuting to above give IV Fe while here and continue on supplement outpt     Peripheral neuropathy-continue home gabapentin     Depression and loss of appetite-continue mirtazapine     GERD-utilize Protonix for home PPI substitute     Hyperlipidemia-continue pravastatin           Active Hospital Problems    Diagnosis  POA    **Acute on chronic anemia [D64.9]  Yes      Resolved Hospital Problems   No resolved problems to display.         Hospital Course  Patient is a 89 y.o. female presented with known chronic anemia.  She was admitted given IV blood transfusion overall hemoglobin remained stable.  She had no evidence of bleeding while here.  She had a negative push enteroscopy.   She seemed to be back to baseline.  She was given IV iron to replete her iron stores while here.  She needs to have a CBC in  1 week as an outpatient to ensure the hemoglobin is continuing to be stable.  She was felt able to return to [facility] with home health.      Procedures Performed: As noted    Procedure(s):  ENTEROSCOPY SMALL BOWEL  -------------------  08/03 1407 UPPER GI ENDOSCOPY    Consults:   Consults       Date and Time Order Name Status Description    8/2/2023  9:46 AM Inpatient Gastroenterology Consult              Pertinent Test Results:     Results from last 7 days   Lab Units 08/04/23  0423 08/03/23  0549 08/02/23  1339 08/02/23  0513   WBC 10*3/mm3 10.31 7.81  --  8.79   HEMOGLOBIN g/dL 7.1* 7.5* 8.8* 7.2*   HEMATOCRIT % 23.7* 24.8* 27.9* 23.0*   PLATELETS 10*3/mm3 198 182  --  172     Results from last 7 days   Lab Units 08/04/23  0423 08/03/23  0549 08/02/23  0513 08/01/23  1315   SODIUM mmol/L 143 144 144 144   POTASSIUM mmol/L 3.8 3.8 3.7 4.0   CHLORIDE mmol/L 108* 109* 108* 105   CO2 mmol/L 24.0 25.6 26.0 27.3   BUN mg/dL 32* 47* 55* 52*   CREATININE mg/dL 1.51* 1.57* 1.62* 1.82*   CALCIUM mg/dL 7.9* 8.4 8.2 8.9   BILIRUBIN mg/dL  --   --   --  0.3   ALK PHOS U/L  --   --   --  64   ALT (SGPT) U/L  --   --   --  8   AST (SGOT) U/L  --   --   --  14   GLUCOSE mg/dL 94 82 88 115*       Microbiology Results (last 10 days)       Procedure Component Value - Date/Time    COVID-19 and FLU A/B PCR - Swab, Nasopharynx [[id number]]  (Normal) Collected: 08/01/23 1340    Lab Status: Final result Specimen: Swab from Nasopharynx Updated: 08/01/23 1403     COVID19 Not Detected     Influenza A PCR Not Detected     Influenza B PCR Not Detected    Narrative:      Fact sheet for providers: https://www.fda.gov/media/164636/download    Fact sheet for patients: https://www.fda.gov/media/056877/download    Test performed by PCR.            Results for orders placed during the hospital encounter of 02/28/18    Adult Transthoracic Echo Complete W/ Cont if Necessary Per Protocol    Interpretation Summary  ú Left atrial cavity size is mildly  dilated.  ú There is calcification of the aortic valve.  ú Mild-to-moderate mitral valve regurgitation is present  ú Mild tricuspid valve regurgitation is present.  ú Left Ventricle: Calculated EF = 62.9%.  ú There is no evidence of pericardial effusion      Imaging Results (All)       Procedure Component Value Units Date/Time    US Renal Bilateral [[id number]] Collected: 08/02/23 0834     Updated: 08/02/23 0944    Narrative:      BILATERAL RENAL ULTRASOUND, 8/2/2023     HISTORY:  Abnormal CT yesterday demonstrating probable complex cyst in the  posterolateral left kidney measuring 10 mm. Ultrasound requested for  further evaluation and to exclude solid mass.     TECHNIQUE:  Grayscale ultrasound imaging of both kidneys and the urinary bladder was  performed. Correlation made with abdomen and pelvis CT 8/1/2023.     FINDINGS:  Bladder decompressed and not well visualized or assessed. The right  kidney is nonobstructed and measures approximately 9.7 cm. No shadowing  stone. There is cortical thinning. There is a simple cyst laterally  measuring 1.2 cm corresponding with a simple cyst on prior CT. The left  kidney is nonobstructed and also demonstrates cortical thinning. Left  kidney measures 11.1 cm. No shadowing stone. There is a simple cyst in  the lateral lower pole left kidney measuring 1.3 cm corresponding to a  simple cyst on the prior CT. The lesion of interest in the lateral mid  to lower pole left kidney measuring 10 mm is not visible on ultrasound  imaging today. Given patient age category and diminished renal function,  rather than proceeding with further evaluation with dedicated renal  protocol CT, consider 6-month follow-up noncontrast CT to reassess  stability of the probable proteinaceous cyst on the prior CT study.       Impression:         1. No hydronephrosis or shadowing stone on either side. Cortical  thinning that is nonspecific but often associated with chronic renal  parenchymal disease.  2. The  lesion of interest felt to represent a proteinaceous/hemorrhagic  cyst in the lateral midpole left kidney is not visible on ultrasound  today. See discussion above regarding 6-month follow-up noncontrast CT.  3. Bilateral simple renal cysts corresponding to simple cysts on prior  CT.     This report was finalized on 8/2/2023 9:42 AM by Dr. Tristan Cooley MD.       CT Abdomen Pelvis Without Contrast [[id number]] Collected: 08/01/23 1431     Updated: 08/01/23 1541    Narrative:      CT ABDOMEN AND PELVIS WITHOUT CONTRAST 8/1/2023     HISTORY:  89-year-old female with generalized weakness and shortness of air. Low  hemoglobin and red blood cell count.     TECHNIQUE:   Noncontrast CT of the abdomen and pelvis was performed. Sagittal and  coronal reformats performed. No comparisons. Radiation dose reduction  techniques included automated exposure control or exposure modulation  based on body size. Radiation audit for CT and nuclear cardiology exams  in the last 12 months: 0.     ABDOMEN FINDINGS:   Included lung bases show areas of atelectasis. Old healed granulomatous  disease. The heart is enlarged. There is no effusion. Normal caliber  aorta with advanced atherosclerotic change. The spleen and adrenal  glands are negative and the pancreas is negative. Uncomplicated  cholelithiasis. Liver and spleen both show granulomatous changes. The  kidneys are nonobstructed. There is atrophy and cortical scarring  bilaterally. Probable faint renal vascular calcifications on the left.  No distinct ureteral stone. In the lateral midpole left kidney there is  a mildly hyperdense exophytic lesion probably representing a hemorrhagic  cyst and measuring 10 mm. This could be confirmed with nonemergent renal  ultrasound. There is no threshold adenopathy.     PELVIS FINDINGS:   Leiomyomatous change of the uterus. No drainable fluid collection.  Negative bladder. There is no bowel obstruction or focal inflammatory  change of the bowel.  Negative terminal ileum. Appendix surgically absent  by history. Inguinal canals are negative. There are advanced  degenerative changes in the lower lumbar spine.  negative.       Impression:      1. No clearly acute process in the abdomen or pelvis. No bowel  obstruction drainable fluid collection or focal area of inflammatory  change. Surgically absent appendix.  2. Uncomplicated cholelithiasis.  3. Probable proteinaceous/hemorrhagic cyst in the posterior lateral left  kidney measuring 10 mm. This could be confirmed with renal ultrasound.  Additional benign simple cyst arising from the lower pole left kidney  requiring no additional follow-up.  4. Leiomyomatous change of uterus.        This report was finalized on 8/1/2023 3:39 PM by Dr. Tristan Cooley MD.       XR Chest 1 View [[id number]] Collected: 08/01/23 1341     Updated: 08/01/23 1444    Narrative:      XR CHEST 1 VW-: 8/1/2023 3:29 PM     INDICATION:   Short of air 2 days. HISTORY of congestive heart failure hypertension  and coronary artery disease. Former smoker.     COMPARISON:    6/18/2023.     FINDINGS:  Single Portable AP view(s) of the chest. Cardiac silhouette is enlarged.  The aortic knob is prominent and atherosclerotic, unchanged.  Interstitial prominence and cephalization most characteristic of mild  volume overload with probable mild perihilar edema. There is no dense  consolidation or effusion. No pneumothorax. There is thoracic  spondylosis.       Impression:         1. Cardiomegaly and probable volume overload. Inflammatory/infectious  infiltrates felt to be less likely. Follow-up to clearing recommended.  No pneumothorax.     This report was finalized on 8/1/2023 2:42 PM by Dr. Tristan Cooley MD.                 Condition on Discharge: Stable    Vital Signs  Temp:  [96.6 øF (35.9 øC)-98.3 øF (36.8 øC)] 98.3 øF (36.8 øC)  Heart Rate:  [65-78] 75  Resp:  [10-22] 16  BP: (100-130)/(56-71) 125/59    Physical Exam:  Physical Exam  Vitals  reviewed.   Constitutional:       General: She is not in acute distress.  Cardiovascular:      Rate and Rhythm: Normal rate.   Abdominal:      Tenderness: There is no abdominal tenderness.   Musculoskeletal:      Right lower leg: No edema.      Left lower leg: No edema.       Emotional Behavior:         Depression none               Anxiety none    Debilities:     Agitation  none    Discharge Disposition  Home-Health Care [facility]    Discharge Medications     Discharge Medications        Changes to Medications        Instructions Start Date   acetaminophen 325 MG tablet  Commonly known as: TYLENOL  What changed: how much to take   487.5 mg, Oral, Every 4 Hours PRN             Continue These Medications        Instructions Start Date   alendronate 70 MG tablet  Commonly known as: FOSAMAX   70 mg, Oral, Every 7 Days      amiodarone 200 MG tablet  Commonly known as: PACERONE   200 mg, Oral, Daily      amLODIPine 5 MG tablet  Commonly known as: NORVASC   5 mg, Oral, Daily      bisacodyl 5 MG EC tablet  Commonly known as: DULCOLAX   5 mg, Oral, Daily PRN      cholecalciferol 10 MCG (400 UNIT) tablet  Commonly known as: VITAMIN D3   400 Units, Oral, 2 Times Daily      docusate sodium 100 MG capsule  Commonly known as: COLACE   100 mg, Oral, 2 Times Daily PRN      ferrous sulfate 325 (65 FE) MG tablet   325 mg, Oral, Daily With Breakfast      gabapentin 100 MG capsule  Commonly known as: NEURONTIN   100 mg, Oral, 2 Times Daily      Magnesium Oxide -Mg Supplement 400 (240 Mg) MG tablet   400 mg, Oral, Daily      metoprolol tartrate 25 MG tablet  Commonly known as: LOPRESSOR   12.5 mg, Oral, Every 12 Hours Scheduled      mirtazapine 15 MG tablet  Commonly known as: REMERON   15 mg, Oral, Nightly      oyster shell calcium/vitamin d 250-125 MG-UNIT tablet tablet   2 tablets, Oral, 2 Times Daily      pantoprazole 40 MG EC tablet  Commonly known as: Protonix   40 mg, Oral, Daily      pravastatin 80 MG tablet  Commonly known as:  PRAVACHOL   80 mg, Oral, Daily               Discharge Diet:   Diet Instructions       Diet: Regular/House Diet; Regular Texture (IDDSI 7); Thin (IDDSI 0)      Discharge Diet: Regular/House Diet    Texture: Regular Texture (IDDSI 7)    Fluid Consistency: Thin (IDDSI 0)            Activity at Discharge:   Activity Instructions       Activity as Tolerated              Follow-up Appointments  Future Appointments   Date Time Provider Department Center   8/16/2023  9:30 AM Krysta Richards APRN [facility]     Additional Instructions for the Follow-ups that You Need to Schedule       Discharge Follow-up with PCP   As directed       Currently Documented PCP:    Jimena Aj MD    PCP Phone Number:    [phone]     Follow Up Details: one week                Test Results Pending at Discharge       Risk for Readmission (LACE) Score: 11 (8/4/2023  6:00 AM)          Time: Discharge 34 min with face-to-face history exam, writing of prescriptions, and documenting discharge data including care coordination with the nursing staff.      Electronically signed by Fili Gillespie DO, 08/04/23, 10:13 EDT.

## 2023-08-04 NOTE — PLAN OF CARE
Goal Outcome Evaluation:  Plan of Care Reviewed With: patient        Progress: no change  Outcome Evaluation: 89 - 90 % r/a while sleeping. O2 1L n/c.  Pt given her gold ring and watch back that was locked up in her room wall box. Pt refused her scds after 0200. Up to restroom with walker and voiding.  Pt reports bm x2 8/3. Refused scheduled hs pericolace.

## 2023-08-04 NOTE — PLAN OF CARE
Goal Outcome Evaluation:  Plan of Care Reviewed With: patient           Outcome Evaluation: Physical therapy evaluation complete.  Patient reports she has had no difficulty with mobility since being in hospital and is agreeable to evaluation with encouragement.  Patient performs sit to stand with SBA and gait x175 fet with SBA.  Patient manages direction changes without difficulty and avoids obstacles without balance loss.  Patient reports she is independent with all exercises and has no concerns regarding mobility at this time.  No further PT needs in acute care setting, recommend resuming home health services upon return to assisted living facility.

## 2023-08-04 NOTE — PROGRESS NOTES
Patient: Aria MCMILLAN Abdullahi  Procedure(s) with comments:  ENTEROSCOPY SMALL BOWEL - tattoo to small bowel 60cm  Anesthesia type: MAC    Patient location: [facility]  Last vitals:   Vitals:    08/04/23 0950   BP:    Pulse: 75   Resp:    Temp:    SpO2:      Level of consciousness: awake, alert, and oriented    Post-anesthesia pain: adequate analgesia  Airway patency: patent  Respiratory: unassisted  Cardiovascular: stable and blood pressure at baseline  Hydration: euvolemic    Anesthetic complications: no

## 2023-08-07 ENCOUNTER — READMISSION MANAGEMENT (OUTPATIENT)
Dept: CALL CENTER | Facility: HOSPITAL | Age: 88
End: 2023-08-07
Payer: MEDICARE

## 2023-08-07 NOTE — OUTREACH NOTE
Medical Week 1 Survey      Flowsheet Row Responses   [facility] patient discharged from? [place]   Does the patient have one of the following disease processes/diagnoses(primary or secondary)? Other   Week 1 attempt successful? Yes   Call start time 1212   Call end time 1212   Discharge diagnosis Acute on chronic anemia   Person spoke with today (if not patient) and relationship Ina, [place]   Meds reviewed with patient/caregiver? Yes   Is the patient having any side effects they believe may be caused by any medication additions or changes? No   Does the patient have all medications ordered at discharge? Yes   Is the patient taking all medications as directed (includes completed medication regime)? Yes   Does the patient have a primary care provider?  Yes   Does the patient have an appointment with their PCP within 7 days of discharge? Yes   Has the patient kept scheduled appointments due by today? N/A   What is the Home health agency?  [facility]    Has home health visited the patient within 72 hours of discharge? Yes   Psychosocial issues? No   What is the patient's perception of their health status since discharge? Improving   Week 1 call completed? Yes   Graduated Yes   Call end time 1212            Josselin AVILA - Registered Nurse

## 2023-08-15 ENCOUNTER — HOSPITAL ENCOUNTER (OUTPATIENT)
Facility: HOSPITAL | Age: 88
Setting detail: OBSERVATION
Discharge: HOME | End: 2023-08-17
Attending: STUDENT IN AN ORGANIZED HEALTH CARE EDUCATION/TRAINING PROGRAM | Admitting: INTERNAL MEDICINE
Payer: MEDICARE

## 2023-08-15 ENCOUNTER — APPOINTMENT (OUTPATIENT)
Dept: GENERAL RADIOLOGY | Facility: HOSPITAL | Age: 88
End: 2023-08-15
Payer: MEDICARE

## 2023-08-15 DIAGNOSIS — K21.00 GASTROESOPHAGEAL REFLUX DISEASE WITH ESOPHAGITIS WITHOUT HEMORRHAGE: ICD-10-CM

## 2023-08-15 DIAGNOSIS — D62 ANEMIA DUE TO BLOOD LOSS, ACUTE: ICD-10-CM

## 2023-08-15 DIAGNOSIS — D50.0 ANEMIA DUE TO BLOOD LOSS: ICD-10-CM

## 2023-08-15 DIAGNOSIS — R06.00 DYSPNEA, UNSPECIFIED TYPE: ICD-10-CM

## 2023-08-15 DIAGNOSIS — D50.0 ANEMIA DUE TO GI BLOOD LOSS: ICD-10-CM

## 2023-08-15 DIAGNOSIS — R19.5 OCCULT GI BLEEDING: ICD-10-CM

## 2023-08-15 DIAGNOSIS — D64.9 ANEMIA, UNSPECIFIED TYPE: Primary | ICD-10-CM

## 2023-08-15 DIAGNOSIS — D64.9 ACUTE ON CHRONIC ANEMIA: ICD-10-CM

## 2023-08-15 LAB
ABO GROUP BLD: NORMAL
ALBUMIN SERPL-MCNC: 4 G/DL (ref 3.5–5.2)
ALBUMIN/GLOB SERPL: 2 G/DL
ALP SERPL-CCNC: 68 U/L (ref 39–117)
ALT SERPL W P-5'-P-CCNC: 7 U/L (ref 1–33)
ANION GAP SERPL CALCULATED.3IONS-SCNC: 12.4 MMOL/L (ref 5–15)
ARTERIAL PATENCY WRIST A: POSITIVE
AST SERPL-CCNC: 13 U/L (ref 1–32)
ATMOSPHERIC PRESS: 738 MMHG
BACTERIA UR QL AUTO: ABNORMAL /HPF
BASE EXCESS BLDA CALC-SCNC: 8.1 MMOL/L (ref 0–2)
BASOPHILS # BLD AUTO: 0.04 10*3/MM3 (ref 0–0.2)
BASOPHILS NFR BLD AUTO: 0.3 % (ref 0–1.5)
BDY SITE: ABNORMAL
BILIRUB SERPL-MCNC: 0.3 MG/DL (ref 0–1.2)
BILIRUB UR QL STRIP: NEGATIVE
BLD GP AB SCN SERPL QL: NEGATIVE
BODY TEMPERATURE: 37 C
BUN SERPL-MCNC: 58 MG/DL (ref 8–23)
BUN/CREAT SERPL: 27.1 (ref 7–25)
CALCIUM SPEC-SCNC: 9 MG/DL (ref 8.2–9.6)
CHLORIDE SERPL-SCNC: 97 MMOL/L (ref 98–107)
CLARITY UR: ABNORMAL
CO2 SERPL-SCNC: 30.6 MMOL/L (ref 22–29)
COLOR UR: ABNORMAL
CREAT SERPL-MCNC: 2.14 MG/DL (ref 0.57–1)
DEPRECATED RDW RBC AUTO: 80 FL (ref 37–54)
EGFRCR SERPLBLD CKD-EPI 2021: 21.3 ML/MIN/1.73
EOSINOPHIL # BLD AUTO: 0.1 10*3/MM3 (ref 0–0.4)
EOSINOPHIL NFR BLD AUTO: 0.8 % (ref 0.3–6.2)
ERYTHROCYTE [DISTWIDTH] IN BLOOD BY AUTOMATED COUNT: 21.2 % (ref 12.3–15.4)
GAS FLOW AIRWAY: 2 LPM
GEN 5 2HR TROPONIN T REFLEX: 25 NG/L
GLOBULIN UR ELPH-MCNC: 2 GM/DL
GLUCOSE SERPL-MCNC: 119 MG/DL (ref 65–99)
GLUCOSE UR STRIP-MCNC: NEGATIVE MG/DL
HCO3 BLDA-SCNC: 32.2 MMOL/L (ref 20–26)
HCT VFR BLD AUTO: 20.4 % (ref 34–46.6)
HGB BLD-MCNC: 5.9 G/DL (ref 12–15.9)
HGB BLDA-MCNC: 5.4 G/DL (ref 13.5–17.5)
HGB UR QL STRIP.AUTO: NEGATIVE
HYALINE CASTS UR QL AUTO: ABNORMAL /LPF
IMM GRANULOCYTES # BLD AUTO: 0.1 10*3/MM3 (ref 0–0.05)
IMM GRANULOCYTES NFR BLD AUTO: 0.8 % (ref 0–0.5)
INR PPP: 3.09 (ref 0.9–1.1)
KETONES UR QL STRIP: NEGATIVE
LEUKOCYTE ESTERASE UR QL STRIP.AUTO: ABNORMAL
LYMPHOCYTES # BLD AUTO: 2.45 10*3/MM3 (ref 0.7–3.1)
LYMPHOCYTES NFR BLD AUTO: 18.8 % (ref 19.6–45.3)
Lab: ABNORMAL
MCH RBC QN AUTO: 30.9 PG (ref 26.6–33)
MCHC RBC AUTO-ENTMCNC: 28.9 G/DL (ref 31.5–35.7)
MCV RBC AUTO: 106.8 FL (ref 79–97)
MODALITY: ABNORMAL
MONOCYTES # BLD AUTO: 1.55 10*3/MM3 (ref 0.1–0.9)
MONOCYTES NFR BLD AUTO: 11.9 % (ref 5–12)
NEUTROPHILS NFR BLD AUTO: 67.4 % (ref 42.7–76)
NEUTROPHILS NFR BLD AUTO: 8.76 10*3/MM3 (ref 1.7–7)
NITRITE UR QL STRIP: NEGATIVE
NRBC BLD AUTO-RTO: 0.5 /100 WBC (ref 0–0.2)
PCO2 BLDA: 42.3 MM HG (ref 35–45)
PCO2 TEMP ADJ BLD: 42.3 MM HG (ref 35–45)
PH BLDA: 7.49 PH UNITS (ref 7.35–7.45)
PH UR STRIP.AUTO: 5.5 [PH] (ref 4.5–8)
PH, TEMP CORRECTED: 7.49 PH UNITS (ref 7.35–7.45)
PLATELET # BLD AUTO: 337 10*3/MM3 (ref 140–450)
PMV BLD AUTO: 11.1 FL (ref 6–12)
PO2 BLDA: 96.6 MM HG (ref 83–108)
PO2 TEMP ADJ BLD: 96.6 MM HG (ref 83–108)
POTASSIUM SERPL-SCNC: 3.3 MMOL/L (ref 3.5–5.2)
PROCALCITONIN SERPL-MCNC: 0.16 NG/ML (ref 0–0.25)
PROT SERPL-MCNC: 6 G/DL (ref 6–8.5)
PROT UR QL STRIP: NEGATIVE
PROTHROMBIN TIME: 31.3 SECONDS (ref 12.1–15)
QT INTERVAL: 455 MS
RBC # BLD AUTO: 1.91 10*6/MM3 (ref 3.77–5.28)
RBC # UR STRIP: ABNORMAL /HPF
RBC MORPH BLD: NORMAL
REF LAB TEST METHOD: ABNORMAL
RH BLD: NEGATIVE
SAO2 % BLDCOA: 99.1 % (ref 94–99)
SMALL PLATELETS BLD QL SMEAR: NORMAL
SODIUM SERPL-SCNC: 140 MMOL/L (ref 136–145)
SP GR UR STRIP: 1.01 (ref 1–1.03)
SQUAMOUS #/AREA URNS HPF: ABNORMAL /HPF
T&S EXPIRATION DATE: NORMAL
TROPONIN T DELTA: -1 NG/L
TROPONIN T SERPL HS-MCNC: 26 NG/L
UROBILINOGEN UR QL STRIP: ABNORMAL
WBC # UR STRIP: ABNORMAL /HPF
WBC MORPH BLD: NORMAL
WBC NRBC COR # BLD: 13 10*3/MM3 (ref 3.4–10.8)

## 2023-08-15 PROCEDURE — 80053 COMPREHEN METABOLIC PANEL: CPT | Performed by: STUDENT IN AN ORGANIZED HEALTH CARE EDUCATION/TRAINING PROGRAM

## 2023-08-15 PROCEDURE — 84145 PROCALCITONIN (PCT): CPT | Performed by: STUDENT IN AN ORGANIZED HEALTH CARE EDUCATION/TRAINING PROGRAM

## 2023-08-15 PROCEDURE — 99284 EMERGENCY DEPT VISIT MOD MDM: CPT

## 2023-08-15 PROCEDURE — 71046 X-RAY EXAM CHEST 2 VIEWS: CPT

## 2023-08-15 PROCEDURE — 85007 BL SMEAR W/DIFF WBC COUNT: CPT | Performed by: STUDENT IN AN ORGANIZED HEALTH CARE EDUCATION/TRAINING PROGRAM

## 2023-08-15 PROCEDURE — 93010 ELECTROCARDIOGRAM REPORT: CPT | Performed by: INTERNAL MEDICINE

## 2023-08-15 PROCEDURE — 87086 URINE CULTURE/COLONY COUNT: CPT | Performed by: STUDENT IN AN ORGANIZED HEALTH CARE EDUCATION/TRAINING PROGRAM

## 2023-08-15 PROCEDURE — 86923 COMPATIBILITY TEST ELECTRIC: CPT

## 2023-08-15 PROCEDURE — 86901 BLOOD TYPING SEROLOGIC RH(D): CPT | Performed by: STUDENT IN AN ORGANIZED HEALTH CARE EDUCATION/TRAINING PROGRAM

## 2023-08-15 PROCEDURE — 85610 PROTHROMBIN TIME: CPT | Performed by: STUDENT IN AN ORGANIZED HEALTH CARE EDUCATION/TRAINING PROGRAM

## 2023-08-15 PROCEDURE — G0378 HOSPITAL OBSERVATION PER HR: HCPCS

## 2023-08-15 PROCEDURE — 94799 UNLISTED PULMONARY SVC/PX: CPT

## 2023-08-15 PROCEDURE — 99223 1ST HOSP IP/OBS HIGH 75: CPT | Performed by: INTERNAL MEDICINE

## 2023-08-15 PROCEDURE — 93005 ELECTROCARDIOGRAM TRACING: CPT | Performed by: STUDENT IN AN ORGANIZED HEALTH CARE EDUCATION/TRAINING PROGRAM

## 2023-08-15 PROCEDURE — 86900 BLOOD TYPING SEROLOGIC ABO: CPT | Performed by: STUDENT IN AN ORGANIZED HEALTH CARE EDUCATION/TRAINING PROGRAM

## 2023-08-15 PROCEDURE — 81001 URINALYSIS AUTO W/SCOPE: CPT | Performed by: STUDENT IN AN ORGANIZED HEALTH CARE EDUCATION/TRAINING PROGRAM

## 2023-08-15 PROCEDURE — 36600 WITHDRAWAL OF ARTERIAL BLOOD: CPT

## 2023-08-15 PROCEDURE — 87077 CULTURE AEROBIC IDENTIFY: CPT | Performed by: STUDENT IN AN ORGANIZED HEALTH CARE EDUCATION/TRAINING PROGRAM

## 2023-08-15 PROCEDURE — 85025 COMPLETE CBC W/AUTO DIFF WBC: CPT | Performed by: STUDENT IN AN ORGANIZED HEALTH CARE EDUCATION/TRAINING PROGRAM

## 2023-08-15 PROCEDURE — 84484 ASSAY OF TROPONIN QUANT: CPT | Performed by: STUDENT IN AN ORGANIZED HEALTH CARE EDUCATION/TRAINING PROGRAM

## 2023-08-15 PROCEDURE — 36430 TRANSFUSION BLD/BLD COMPNT: CPT

## 2023-08-15 PROCEDURE — 82803 BLOOD GASES ANY COMBINATION: CPT

## 2023-08-15 PROCEDURE — P9016 RBC LEUKOCYTES REDUCED: HCPCS

## 2023-08-15 PROCEDURE — 86850 RBC ANTIBODY SCREEN: CPT | Performed by: STUDENT IN AN ORGANIZED HEALTH CARE EDUCATION/TRAINING PROGRAM

## 2023-08-15 PROCEDURE — 87186 SC STD MICRODIL/AGAR DIL: CPT | Performed by: STUDENT IN AN ORGANIZED HEALTH CARE EDUCATION/TRAINING PROGRAM

## 2023-08-15 PROCEDURE — 86900 BLOOD TYPING SEROLOGIC ABO: CPT

## 2023-08-15 RX ORDER — AMLODIPINE BESYLATE 5 MG/1
5 TABLET ORAL DAILY
Status: DISCONTINUED | OUTPATIENT
Start: 2023-08-15 | End: 2023-08-17 | Stop reason: HOSPADM

## 2023-08-15 RX ORDER — AMIODARONE HYDROCHLORIDE 200 MG/1
200 TABLET ORAL DAILY
Status: DISCONTINUED | OUTPATIENT
Start: 2023-08-15 | End: 2023-08-17 | Stop reason: HOSPADM

## 2023-08-15 RX ORDER — GABAPENTIN 100 MG/1
100 CAPSULE ORAL 2 TIMES DAILY
Status: DISCONTINUED | OUTPATIENT
Start: 2023-08-15 | End: 2023-08-17 | Stop reason: HOSPADM

## 2023-08-15 RX ORDER — SODIUM CHLORIDE 9 MG/ML
INJECTION, SOLUTION INTRAVENOUS
Status: DISPENSED
Start: 2023-08-15 | End: 2023-08-16

## 2023-08-15 RX ORDER — BISACODYL 5 MG/1
5 TABLET, DELAYED RELEASE ORAL DAILY PRN
Status: DISCONTINUED | OUTPATIENT
Start: 2023-08-15 | End: 2023-08-17 | Stop reason: HOSPADM

## 2023-08-15 RX ORDER — PRAVASTATIN SODIUM 20 MG
80 TABLET ORAL DAILY
Status: DISCONTINUED | OUTPATIENT
Start: 2023-08-15 | End: 2023-08-17 | Stop reason: HOSPADM

## 2023-08-15 RX ORDER — SODIUM CHLORIDE 0.9 % (FLUSH) 0.9 %
10 SYRINGE (ML) INJECTION AS NEEDED
Status: DISCONTINUED | OUTPATIENT
Start: 2023-08-15 | End: 2023-08-17 | Stop reason: HOSPADM

## 2023-08-15 RX ORDER — MIRTAZAPINE 15 MG/1
15 TABLET, FILM COATED ORAL NIGHTLY
Status: DISCONTINUED | OUTPATIENT
Start: 2023-08-15 | End: 2023-08-17 | Stop reason: HOSPADM

## 2023-08-15 RX ORDER — SODIUM CHLORIDE 0.9 % (FLUSH) 0.9 %
10 SYRINGE (ML) INJECTION EVERY 12 HOURS SCHEDULED
Status: DISCONTINUED | OUTPATIENT
Start: 2023-08-15 | End: 2023-08-17 | Stop reason: HOSPADM

## 2023-08-15 RX ORDER — ACETAMINOPHEN 500 MG
500 TABLET ORAL EVERY 4 HOURS PRN
Status: DISCONTINUED | OUTPATIENT
Start: 2023-08-15 | End: 2023-08-17 | Stop reason: HOSPADM

## 2023-08-15 RX ORDER — SODIUM CHLORIDE 9 MG/ML
40 INJECTION, SOLUTION INTRAVENOUS AS NEEDED
Status: DISCONTINUED | OUTPATIENT
Start: 2023-08-15 | End: 2023-08-17 | Stop reason: HOSPADM

## 2023-08-15 RX ORDER — OMEGA-3S/DHA/EPA/FISH OIL/D3 300MG-1000
400 CAPSULE ORAL 2 TIMES DAILY
Status: DISCONTINUED | OUTPATIENT
Start: 2023-08-15 | End: 2023-08-17 | Stop reason: HOSPADM

## 2023-08-15 RX ORDER — PANTOPRAZOLE SODIUM 40 MG/1
40 TABLET, DELAYED RELEASE ORAL
Status: DISCONTINUED | OUTPATIENT
Start: 2023-08-15 | End: 2023-08-17 | Stop reason: HOSPADM

## 2023-08-15 RX ORDER — FERROUS SULFATE TAB EC 324 MG (65 MG FE EQUIVALENT) 324 (65 FE) MG
324 TABLET DELAYED RESPONSE ORAL
Status: DISCONTINUED | OUTPATIENT
Start: 2023-08-16 | End: 2023-08-17 | Stop reason: HOSPADM

## 2023-08-15 RX ORDER — DOCUSATE SODIUM 100 MG/1
100 CAPSULE, LIQUID FILLED ORAL 2 TIMES DAILY PRN
Status: DISCONTINUED | OUTPATIENT
Start: 2023-08-15 | End: 2023-08-17 | Stop reason: HOSPADM

## 2023-08-15 RX ADMIN — MIRTAZAPINE 15 MG: 15 TABLET, FILM COATED ORAL at 20:32

## 2023-08-15 RX ADMIN — GABAPENTIN 100 MG: 100 CAPSULE ORAL at 20:32

## 2023-08-15 RX ADMIN — Medication 10 ML: at 20:34

## 2023-08-15 RX ADMIN — METOPROLOL TARTRATE 12.5 MG: 25 TABLET, FILM COATED ORAL at 20:32

## 2023-08-15 NOTE — ED PROVIDER NOTES
Subjective   History of Present Illness  Pt is a 89 y.o. female with PMH as listed who presents for   Chief Complaint   Patient presents with    Weakness - Generalized       Patient presents with 4 to 5 days of increased weakness and fatigue.  Her daughter bedside states that patient has been doing with similar issues over the past month, was admitted to the hospital for similar symptoms approximately 2 weeks ago and was found to have low hemoglobin requiring multiple blood transfusions.  She also endorses associated dyspnea with exertion, no chest pain.  Denies any other injuries.  States that she has not had any gross blood in her bowel movements but has had black stools.  She attributes this to the iron that she takes regularly.  She states that she spoke with her PCP Dr. Aj today who recommended that she come back to the ED for evaluation and further management given the recurrence of her symptoms and possible need for further blood transfusions.  Has not had any fever, abdominal pain, nausea, vomiting.  No other new complaints at this time.        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     On anticoagulant therapy     Osteoarthritis     Polycythemia     PVD (peripheral vascular disease)        Allergies   Allergen Reactions    Clinoril [Sulindac] Itching    Codeine Itching    Coumadin [Warfarin Sodium] GI Bleeding    Ibuprofen-Oxycodone [Oxycodone-Ibuprofen] Itching    Keflex [Cephalexin] Itching    Mydriacyl [Tropicamide] Angioedema    Don-Synephrine [Phenylephrine] Angioedema    Penicillins Angioedema    Phenylephrine Hcl (Pressors) Angioedema    Sulfa Antibiotics Angioedema     Zantac [Ranitidine Hcl] Nausea And Vomiting       Past Surgical History:   Procedure Laterality Date    APPENDECTOMY      CAROTID ENDARTERECTOMY Left     COLONOSCOPY N/A 03/03/2018    Procedure:  Colonoscopy to Terminal ileum with APC treatment for 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 and cold biopsy;  Surgeon: Terrie Carroll MD;  Location:  [facility];  Service:     COLONOSCOPY W/ POLYPECTOMY N/A 6/21/2023    Procedure: COLONOSCOPY WITH POLYPECTOMY AND APC;  Surgeon: Emiliano Rodriguez MD;  Location:  [facility];  Service: Gastroenterology;  Laterality: N/A;  transverse polyp-forcep  cecal time 1659  APC  DESCENDING POLYP  SIGMOID POLYP    ENDOSCOPY N/A 03/03/2018    Procedure: EGD with biopsy;  Surgeon: Terrie Carroll MD;  Location:  [facility];  Service:     ENDOSCOPY N/A 6/19/2023    Procedure: ESOPHAGOGASTRODUODENOSCOPY;  Surgeon: Emiliano Rodriguez MD;  Location: [facility] OR;  Service: Gastroenterology;  Laterality: N/A;  Reflux, Gastritis    ENDOSCOPY N/A 8/3/2023    Procedure: ENTEROSCOPY SMALL BOWEL;  Surgeon: Vadim Carr MD;  Location: [facility] OR;  Service: Gastroenterology;  Laterality: N/A;  tattoo to small bowel 60cm    KNEE ARTHROSCOPY Right        No family history on file.    Social History     Socioeconomic History    Marital status: Single   Tobacco Use    Smoking status: Never    Smokeless tobacco: Never   Vaping Use    Vaping Use: Never used   Substance and Sexual Activity    Alcohol use: No    Drug use: No    Sexual activity: Never           Objective   Physical Exam  Constitutional:       Appearance: Normal appearance.   HENT:      Head: Normocephalic and atraumatic.      Mouth/Throat:      Mouth: Mucous membranes are moist.      Pharynx: Oropharynx is clear.   Eyes:      Comments: Pale conjunctiva   Cardiovascular:      Rate and Rhythm: Normal rate and regular rhythm.   Pulmonary:      Effort: Pulmonary effort is normal.      Breath sounds: Normal breath sounds.   Abdominal:       General: Abdomen is flat.   Genitourinary:     Rectum: Normal. Guaiac result positive. No tenderness or internal hemorrhoid.   Musculoskeletal:      Cervical back: Neck supple.   Skin:     General: Skin is warm and dry.   Neurological:      General: No focal deficit present.      Mental Status: She is alert and oriented to person, place, and time.   Psychiatric:         Mood and Affect: Mood normal.       Procedures           ED Course  ED Course as of 08/15/23 1724   e Aug 15, 2023   1614 Patient with pale conjunctiva, fairly normal exam on initial assessment.  Will obtain CBC, CMP, troponin, EKG, procalcitonin, chest x-ray as well as type and screen given likely need for transfusion given patient's history.  Patient without any pain at this time. [JF]   1642 Hemoglobin 5.9, 2 units PRBCs ordered.  Patient also began to desat to 86%, placed on nasal cannula with improvement in O2 saturation.  ABG ordered. [JF]   1707 Patient known to me from previous acute on chronic anemia admissions. Recently had push endoscopy by Dr. Carr. I was able to briefly speak with Dr. Carr regarding Ms. Fernando and we mutually agreed patient would benefit from Pill endoscopy. Unfortunately this will require transfer as that is not available at this facility.  [MN]   1723 Spoke with patient and patient's daughter [facility] and plan for admission to [facility] for blood transfusions and further monitoring.  Patient and patient's daughter understand and agree with plan of care. [JF]      ED Course User Index  [JF] Emmanuel Dubon MD  [MN] Alberto Newman, DO                                           Medical Decision Making  My differential diagnosis includes but is not limited to generalized weakness, electrolyte abnormality, CVA, TIA, Bell's palsy, acute MI, anemia, GI bleed, urinary tract infection, systemic infections including sepsis, alcohol abuse,  drug abuse including prescription and street drug.      Amount and/or Complexity of Data Reviewed  Independent Historian:      Details: daughter  External Data Reviewed: labs and ECG.     Details: Hemoglobin 6.1 at time of last admission  Labs: ordered.  Radiology: ordered.  ECG/medicine tests: ordered and independent interpretation performed.     Details: EKG  8/15/2023 at 1606  Rhythm sinus, rate 66  Normal axis, normal intervals, nonspecific ST changes  Similar to prior EKG from 8/1/2023  Discussion of management or test interpretation with external provider(s): Spoke with Dr. Ham and the hospitalist regarding patient's hemoglobin need for inpatient mission for blood transfusion.  He spoke with Dr. Carr, MAJO regarding further recommendations given patient's repeat admissions for blood transfusions, Dr. Ham and Dr. Carr with GI both recommend that patient be transferred to another facility that is capable of pill endoscopy, [facility] being preferred.  Will call [facility] hospitalist for transfer for admission for further management.    Spoke with Dr. Kaplan With GI at [facility]. He states that they do not perform inpatient pill endoscopy but will perform it as an outpatient procedure.    Spoke with Dr. Newman hospitalist again regarding admission for blood transfusion and further monitoring.  He agrees to admit patient for observation and repeat labs in the morning.        Final diagnoses:   Anemia, unspecified type   Dyspnea, unspecified type       ED Disposition  ED Disposition       ED Disposition   Decision to Admit    Condition   --    Comment   Level of Care: Telemetry [5]   Admitting Physician: MIRZA NEWMAN [[id number]]                 No follow-up provider specified.       Medication List      No changes were made to your prescriptions during this visit.            Emmanuel Dubon MD  08/15/23 5951

## 2023-08-15 NOTE — PLAN OF CARE
Goal Outcome Evaluation:  Plan of Care Reviewed With: patient           Outcome Evaluation: pt came from ER, nc 2lpm, vss, 1st unit of prbc infusing;

## 2023-08-15 NOTE — ED NOTES
"Nursing report ED to floor  Aria Fernando  89 y.o.  female    HPI :   Chief Complaint   Patient presents with    Weakness - Generalized       Admitting doctor:   Alberto Newman DO    Admitting diagnosis:   The primary encounter diagnosis was Anemia, unspecified type. A diagnosis of Dyspnea, unspecified type was also pertinent to this visit.    Code status:   Current Code Status       Date Active Code Status Order ID Comments User Context       8/15/2023 1722 CPR (Attempt to Resuscitate) [id number]  Alberto Newman DO ED        Question Answer    Code Status (Patient has no pulse and is not breathing) CPR (Attempt to Resuscitate)    Medical Interventions (Patient has pulse or is breathing) Full Support                    Allergies:   Clinoril [sulindac], Codeine, Coumadin [warfarin sodium], Ibuprofen-oxycodone [oxycodone-ibuprofen], Keflex [cephalexin], Mydriacyl [tropicamide], Don-synephrine [phenylephrine], Penicillins, Phenylephrine hcl (pressors), Sulfa antibiotics, and Zantac [ranitidine hcl]    Isolation:   No active isolations    Intake and Output  No intake or output data in the 24 hours ending 08/15/23 1724    Weight:       08/15/23  1544   Weight: 82.6 kg (182 lb)       Most recent vitals:   Vitals:    08/15/23 1544 08/15/23 1546 08/15/23 1601 08/15/23 1621   BP: 113/66  111/52    Pulse: 75  69 67   Resp: 20      Temp:  97.9 øF (36.6 øC)     TempSrc:  Oral     SpO2: 94%   94%   Weight: 82.6 kg (182 lb)      Height: 157.5 cm (62\")          Active LDAs/IV Access:   Lines, Drains & Airways       Active LDAs       Name Placement date Placement time Site Days    Peripheral IV 08/15/23 1554 Left Antecubital 08/15/23  1554  Antecubital  less than 1                    Labs (abnormal labs have a star):   Labs Reviewed   COMPREHENSIVE METABOLIC PANEL - Abnormal; Notable for the following components:       Result Value    Glucose 119 (*)     BUN 58 (*)     Creatinine 2.14 (*)     Potassium 3.3 (*)     " Chloride 97 (*)     CO2 30.6 (*)     BUN/Creatinine Ratio 27.1 (*)     eGFR 21.3 (*)     All other components within normal limits    Narrative:     GFR Normal >60  Chronic Kidney Disease <60  Kidney Failure <15    The GFR formula is only valid for adults with stable renal function between ages 18 and 70.   URINALYSIS W/ CULTURE IF INDICATED - Abnormal; Notable for the following components:    Color, UA Green (*)     Appearance, UA Cloudy (*)     Leuk Esterase, UA Small (1+) (*)     All other components within normal limits    Narrative:     In absence of clinical symptoms, the presence of pyuria, bacteria, and/or nitrites on the urinalysis result does not correlate with infection.   TROPONIN - Abnormal; Notable for the following components:    HS Troponin T 26 (*)     All other components within normal limits    Narrative:     High Sensitive Troponin T Reference Range:  <10.0 ng/L- Negative Female for AMI  <15.0 ng/L- Negative Male for AMI  >=10 - Abnormal Female indicating possible myocardial injury.  >=15 - Abnormal Male indicating possible myocardial injury.   Clinicians would have to utilize clinical acumen, EKG, Troponin, and serial changes to determine if it is an Acute Myocardial Infarction or myocardial injury due to an underlying chronic condition.        CBC WITH AUTO DIFFERENTIAL - Abnormal; Notable for the following components:    WBC 13.00 (*)     RBC 1.91 (*)     Hemoglobin 5.9 (*)     Hematocrit 20.4 (*)     .8 (*)     MCHC 28.9 (*)     RDW 21.2 (*)     RDW-SD 80.0 (*)     Lymphocyte % 18.8 (*)     Immature Grans % 0.8 (*)     Neutrophils, Absolute 8.76 (*)     Monocytes, Absolute 1.55 (*)     Immature Grans, Absolute 0.10 (*)     nRBC 0.5 (*)     All other components within normal limits   PROTIME-INR - Abnormal; Notable for the following components:    Protime 31.3 (*)     INR 3.09 (*)     All other components within normal limits    Narrative:     Therapeutic Ranges for INR: 2.0-3.0 (PT  "20-30)                              2.5-3.5 (PT 25-34)   BLOOD GAS, ARTERIAL - Abnormal; Notable for the following components:    pH, Arterial 7.490 (*)     HCO3, Arterial 32.2 (*)     Base Excess, Arterial 8.1 (*)     O2 Saturation, Arterial 99.1 (*)     Hemoglobin, Blood Gas 5.4 (*)     pH, Temp Corrected 7.490 (*)     All other components within normal limits   PROCALCITONIN - Normal    Narrative:     As a Marker for Sepsis (Non-Neonates):    1. <0.5 ng/mL represents a low risk of severe sepsis and/or septic shock.  2. >2 ng/mL represents a high risk of severe sepsis and/or septic shock.    As a Marker for Lower Respiratory Tract Infections that require antibiotic therapy:    PCT on Admission    Antibiotic Therapy       6-12 Hrs later    >0.5                Strongly Recommended  >0.25 - <0.5        Recommended   0.1 - 0.25          Discouraged              Remeasure/reassess PCT  <0.1                Strongly Discouraged     Remeasure/reassess PCT    As 28 day mortality risk marker: \"Change in Procalcitonin Result\" (>80% or <=80%) if Day 0 (or Day 1) and Day 4 values are available. Refer to http://www.[employer] [facility] IQ[facility]ct-calculator.com    Change in PCT <=80%  A decrease of PCT levels below or equal to 80% defines a positive change in PCT test result representing a higher risk for 28-day all-cause mortality of patients diagnosed with severe sepsis for septic shock.    Change in PCT >80%  A decrease of PCT levels of more than 80% defines a negative change in PCT result representing a lower risk for 28-day all-cause mortality of patients diagnosed with severe sepsis or septic shock.      SCAN SLIDE   BLOOD GAS, ARTERIAL   HIGH SENSITIVITIY TROPONIN T 2HR   URINALYSIS, MICROSCOPIC ONLY   TYPE AND SCREEN   PREPARE RBC   CBC AND DIFFERENTIAL    Narrative:     The following orders were created for panel order CBC & Differential.  Procedure                               Abnormality         Status                     ---------      "                          -----------         ------                     CBC Auto Differential[[id number]]        Abnormal            Final result               Scan Slide[[id number]]                                       Final result                 Please view results for these tests on the individual orders.       EKG:   ECG 12 Lead Dyspnea   Final Result   HEART RATE= 66  bpm   RR Interval= 904  ms   WA Interval= 53  ms   P Horizontal Axis=   deg   P Front Axis= 0  deg   QRSD Interval= 104  ms   QT Interval= 455  ms   QRS Axis= 6  deg   T Wave Axis= 183  deg   - ABNORMAL ECG -   Sinus rhythm   Short WA interval   Abnormal T, consider ischemia, diffuse leads   NO PRIOR TRACING AVAILABLE FOR COMPARISON   Electronically Signed By: Mc Humphries ([facility]) 15-Aug-2023 16:35:38   Date and Time of Study: 2023-08-15 16:06:06          Meds given in ED:   Medications   sodium chloride 0.9 % infusion  - ADS Override Pull (has no administration in time range)       Imaging results:  XR Chest 2 View    Result Date: 8/15/2023  Cardiomegaly. Mild prominence of the pulmonary vascularity centrally. Similar findings to prior study of 8/1/2023.   This report was finalized on 8/15/2023 4:28 PM by Dr. Po Ramsey MD.       Ambulatory status:       Social issues:   Social History     Socioeconomic History    Marital status: Single   Tobacco Use    Smoking status: Never    Smokeless tobacco: Never   Vaping Use    Vaping Use: Never used   Substance and Sexual Activity    Alcohol use: No    Drug use: No    Sexual activity: Never       NIH Stroke Scale:       Tiffanie Paz RN  08/15/23 17:24 EDT

## 2023-08-15 NOTE — Clinical Note
Level of Care: Telemetry [5]   Diagnosis: Anemia [855314]   Admitting Physician: MIRZA GIVENS [[id number]]

## 2023-08-15 NOTE — H&P
[facility] HOSPITALIST     PCP: Jimena Aj MD    CODE status: Full    CHIEF COMPLAINT: Weakness    HISTORY OF PRESENT ILLNESS:    Patient is a 89-year-old female very well-known to me in our facility from previous admissions for acute on chronic anemia.  Her most recent admission was the first week of August 2023, in which she was underwent push endoscopy, no active bleeding found, she was therefore given IV iron transfusions and hemoglobin stabilized.  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  Therefore prior to admission this time I reached out to Dr. Vadim Carr in passing and was able to briefly discuss case with him, we mutually agreed patient should undergo pill endoscopy.  Unfortunately this is only done on an outpatient basis therefore admission was necessitated by need for stabilization of her hemoglobin of 5.9 today.  Patient reports today that she has had 4 to 5 days of increasing weakness and fatigue, similar to previous episodes where she has had low blood counts.  She reports she is short of breath with exertion but denies any chest pain.  She has not seen any gross blood in her bowel movements, but she does have chronically black stools and is still taking iron supplements.  Patient spoke to her PCP Dr. Aj who recommended she come back to the ED for evaluation.     2 units of packed red blood cells have been ordered for patient from ED.       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     On anticoagulant therapy     Osteoarthritis     Polycythemia     PVD (peripheral vascular disease)      Past Surgical History:   Procedure  Laterality Date    APPENDECTOMY      CAROTID ENDARTERECTOMY Left     COLONOSCOPY N/A 03/03/2018    Procedure:  Colonoscopy to Terminal ileum with APC treatment for 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 and cold biopsy;  Surgeon: Terrie Carroll MD;  Location:  [facility];  Service:     COLONOSCOPY W/ POLYPECTOMY N/A 6/21/2023    Procedure: COLONOSCOPY WITH POLYPECTOMY AND APC;  Surgeon: Emiliano Rodriguez MD;  Location:  [facility];  Service: Gastroenterology;  Laterality: N/A;  transverse polyp-forcep  cecal time 1659  APC  DESCENDING POLYP  SIGMOID POLYP    ENDOSCOPY N/A 03/03/2018    Procedure: EGD with biopsy;  Surgeon: Terrie Carroll MD;  Location:  [facility];  Service:     ENDOSCOPY N/A 6/19/2023    Procedure: ESOPHAGOGASTRODUODENOSCOPY;  Surgeon: Emiliano Rodriguez MD;  Location:  [facility];  Service: Gastroenterology;  Laterality: N/A;  Reflux, Gastritis    ENDOSCOPY N/A 8/3/2023    Procedure: ENTEROSCOPY SMALL BOWEL;  Surgeon: Vadim Carr MD;  Location:  [facility];  Service: Gastroenterology;  Laterality: N/A;  tattoo to small bowel 60cm    KNEE ARTHROSCOPY Right      No family history on file.  Social History     Tobacco Use    Smoking status: Never    Smokeless tobacco: Never   Vaping Use    Vaping Use: Never used   Substance Use Topics    Alcohol use: No    Drug use: No     (Not in a hospital admission)    Allergies:  Clinoril [sulindac], Codeine, Coumadin [warfarin sodium], Ibuprofen-oxycodone [oxycodone-ibuprofen], Keflex [cephalexin], Mydriacyl [tropicamide], Don-synephrine [phenylephrine], Penicillins, Phenylephrine hcl (pressors), Sulfa antibiotics, and Zantac [ranitidine hcl]    Immunization History   Administered Date(s) Administered    COVID-19 (PFIZER) Purple Cap Monovalent 01/06/2021, 01/27/2021, 12/09/2021, 07/14/2022    Influenza Seasonal Injectable 11/01/2018    Influenza, Unspecified 10/31/2017    Tdap 11/11/2021       REVIEW OF SYSTEMS:  Please see the above history of  "present illness for pertinent positives and negatives.  The remainder of the patient's systems have been reviewed and are negative.     Vital Signs  Temp:  [97.9 øF (36.6 øC)] 97.9 øF (36.6 øC)  Heart Rate:  [67-75] 67  Resp:  [20] 20  BP: (111-113)/(52-66) 111/52  Flowsheet Rows      Flowsheet Row First Filed Value   Admission Height 157.5 cm (62\") Documented at 08/15/2023 1544   Admission Weight 82.6 kg (182 lb) Documented at 08/15/2023 1544               Physical Exam:  General: Patient is awake and alert.  Elderly female. No acute distress noted.   HENT: Head is atraumatic, normocephalic. Hearing is grossly intact. Nose is without obvious congestion and appears patent. Neck is supple and trachea is midline.  Pale conjunctiva.   Eyes: Vision is grossly intact. Pupils appear equal and round.   Cardiovascular: Heart has regular rate and rhythm with no murmurs, rubs or gallops noted.   Respiratory: Lungs are clear to ausculation without wheezes, rhonchi or rales.   Abdominal/GI: Soft, nontender, bowel sounds present. No rebound or guarding present.   Extremities: No peripheral edema noted.   Musculoskeletal: Spontaneous movement of bilateral upper and lower extremities against gravity noted. No signs of injury or deformity noted.   Skin: Warm and dry.   Psych: Mood and affect are appropriate. Cooperative with exam.   Neuro: No facial asymmetry noted. No focal deficits noted, hearing and vision are grossly intact.    Emotional Behavior: all of the following were found to be normal   Judgment and Insight   Mental Status   Memory   Mood and Affect: neither of the following found acutely        Depression                 Anxiety     Debilities: no signs of the following found    Physical Weakness     Handicaps     Disabilities     Agitation         Results Review:    I reviewed the patient's new clinical results.  Lab Results (most recent)       Procedure Component Value Units Date/Time    Urinalysis With Culture If " Indicated - Urine, Clean Catch [[id number]]  (Abnormal) Collected: 08/15/23 1710    Specimen: Urine, Clean Catch Updated: 08/15/23 1722     Color, UA Green     Comment: Any Substance that causes an abnormal urine color can alter the accuracy of the chemical reactions.        Appearance, UA Cloudy     pH, UA 5.5     Specific Gravity, UA 1.015     Glucose, UA Negative     Ketones, UA Negative     Bilirubin, UA Negative     Blood, UA Negative     Protein, UA Negative     Leuk Esterase, UA Small (1+)     Nitrite, UA Negative     Urobilinogen, UA 0.2 E.U./dL    Narrative:      In absence of clinical symptoms, the presence of pyuria, bacteria, and/or nitrites on the urinalysis result does not correlate with infection.    Urinalysis, Microscopic Only - Urine, Clean Catch [[id number]] Collected: 08/15/23 1710    Specimen: Urine, Clean Catch Updated: 08/15/23 1719    Protime-INR [[id number]]  (Abnormal) Collected: 08/15/23 1557    Specimen: Blood Updated: 08/15/23 1718     Protime 31.3 Seconds      INR 3.09    Narrative:      Therapeutic Ranges for INR: 2.0-3.0 (PT 20-30)                              2.5-3.5 (PT 25-34)    CBC & Differential [[id number]]  (Abnormal) Collected: 08/15/23 1557    Specimen: Blood Updated: 08/15/23 1659    Narrative:      The following orders were created for panel order CBC & Differential.  Procedure                               Abnormality         Status                     ---------                               -----------         ------                     CBC Auto Differential[[id number]]        Abnormal            Final result               Scan Slide[[id number]]                                       Final result                 Please view results for these tests on the individual orders.    Scan Slide [[id number]] Collected: 08/15/23 1557    Specimen: Blood Updated: 08/15/23 1659     RBC Morphology Normal     WBC Morphology Normal     Platelet Estimate Increased    Blood Gas, Arterial -  [[id number]]  (Abnormal) Collected: 08/15/23 1650    Specimen: Arterial Blood Updated: 08/15/23 1655     Site Right Radial     Silver's Test Positive     pH, Arterial 7.490 pH units      Comment: 83 Value above reference range        pCO2, Arterial 42.3 mm Hg      pO2, Arterial 96.6 mm Hg      HCO3, Arterial 32.2 mmol/L      Comment: 83 Value above reference range        Base Excess, Arterial 8.1 mmol/L      Comment: 83 Value above reference range        O2 Saturation, Arterial 99.1 %      Comment: 83 Value above reference range        Hemoglobin, Blood Gas 5.4 g/dL      Temperature 37.0 C      Barometric Pressure for Blood Gas 738 mmHg      Modality Nasal Cannula     Flow Rate 2.0 lpm      Collected by [id number]     Comment: Meter: [id number]     :  [id number]        pCO2, Temperature Corrected 42.3 mm Hg      pH, Temp Corrected 7.490 pH Units      pO2, Temperature Corrected 96.6 mm Hg     CBC Auto Differential [[id number]]  (Abnormal) Collected: 08/15/23 1557    Specimen: Blood Updated: 08/15/23 1634     WBC 13.00 10*3/mm3      RBC 1.91 10*6/mm3      Hemoglobin 5.9 g/dL      Hematocrit 20.4 %      .8 fL      MCH 30.9 pg      MCHC 28.9 g/dL      RDW 21.2 %      RDW-SD 80.0 fl      MPV 11.1 fL      Platelets 337 10*3/mm3      Neutrophil % 67.4 %      Lymphocyte % 18.8 %      Monocyte % 11.9 %      Eosinophil % 0.8 %      Basophil % 0.3 %      Immature Grans % 0.8 %      Neutrophils, Absolute 8.76 10*3/mm3      Lymphocytes, Absolute 2.45 10*3/mm3      Monocytes, Absolute 1.55 10*3/mm3      Eosinophils, Absolute 0.10 10*3/mm3      Basophils, Absolute 0.04 10*3/mm3      Immature Grans, Absolute 0.10 10*3/mm3      nRBC 0.5 /100 WBC     Procalcitonin [[id number]]  (Normal) Collected: 08/15/23 1557    Specimen: Blood Updated: 08/15/23 1632     Procalcitonin 0.16 ng/mL     Narrative:      As a Marker for Sepsis (Non-Neonates):    1. <0.5 ng/mL represents a low risk of severe sepsis and/or septic shock.  2. >2  "ng/mL represents a high risk of severe sepsis and/or septic shock.    As a Marker for Lower Respiratory Tract Infections that require antibiotic therapy:    PCT on Admission    Antibiotic Therapy       6-12 Hrs later    >0.5                Strongly Recommended  >0.25 - <0.5        Recommended   0.1 - 0.25          Discouraged              Remeasure/reassess PCT  <0.1                Strongly Discouraged     Remeasure/reassess PCT    As 28 day mortality risk marker: \"Change in Procalcitonin Result\" (>80% or <=80%) if Day 0 (or Day 1) and Day 4 values are available. Refer to http://www.[email or web address] [place]pct-calculator.com    Change in PCT <=80%  A decrease of PCT levels below or equal to 80% defines a positive change in PCT test result representing a higher risk for 28-day all-cause mortality of patients diagnosed with severe sepsis for septic shock.    Change in PCT >80%  A decrease of PCT levels of more than 80% defines a negative change in PCT result representing a lower risk for 28-day all-cause mortality of patients diagnosed with severe sepsis or septic shock.       High Sensitivity Troponin T [[id number]]  (Abnormal) Collected: 08/15/23 1557    Specimen: Blood Updated: 08/15/23 1631     HS Troponin T 26 ng/L     Narrative:      High Sensitive Troponin T Reference Range:  <10.0 ng/L- Negative Female for AMI  <15.0 ng/L- Negative Male for AMI  >=10 - Abnormal Female indicating possible myocardial injury.  >=15 - Abnormal Male indicating possible myocardial injury.   Clinicians would have to utilize clinical acumen, EKG, Troponin, and serial changes to determine if it is an Acute Myocardial Infarction or myocardial injury due to an underlying chronic condition.         Comprehensive Metabolic Panel [[id number]]  (Abnormal) Collected: 08/15/23 1557    Specimen: Blood Updated: 08/15/23 1629     Glucose 119 mg/dL      BUN 58 mg/dL      Creatinine 2.14 mg/dL      Sodium 140 mmol/L      Potassium 3.3 mmol/L      Chloride 97 mmol/L  "     CO2 30.6 mmol/L      Calcium 9.0 mg/dL      Total Protein 6.0 g/dL      Albumin 4.0 g/dL      ALT (SGPT) 7 U/L      AST (SGOT) 13 U/L      Alkaline Phosphatase 68 U/L      Total Bilirubin 0.3 mg/dL      Globulin 2.0 gm/dL      A/G Ratio 2.0 g/dL      BUN/Creatinine Ratio 27.1     Anion Gap 12.4 mmol/L      eGFR 21.3 mL/min/1.73     Narrative:      GFR Normal >60  Chronic Kidney Disease <60  Kidney Failure <15    The GFR formula is only valid for adults with stable renal function between ages 18 and 70.            Imaging Results (Most Recent)       Procedure Component Value Units Date/Time    XR Chest 2 View [[id number]] Collected: 08/15/23 1626     Updated: 08/15/23 1630    Narrative:      CHEST, 2 VIEWS     HISTORY:   Generalized weakness     Comparison:  8/1/2023     FINDINGS:  Lung volumes are diminished. The heart size is enlarged. Mediastinal  structures are midline.     Mild fullness of the pulmonary vascularity centrally. Minimally  increased interstitial markings bilaterally. No pleural fluid. No  pneumothorax.       Impression:      Cardiomegaly. Mild prominence of the pulmonary vascularity  centrally. Similar findings to prior study of 8/1/2023.        This report was finalized on 8/15/2023 4:28 PM by Dr. Po Ramsey MD.             Reviewed     ECG/EMG Results (most recent)       Procedure Component Value Units Date/Time    ECG 12 Lead Dyspnea [[id number]] Collected: 08/15/23 1606     Updated: 08/15/23 1635     QT Interval 455 ms     Narrative:      HEART RATE= 66  bpm  RR Interval= 904  ms  NC Interval= 53  ms  P Horizontal Axis=   deg  P Front Axis= 0  deg  QRSD Interval= 104  ms  QT Interval= 455  ms  QRS Axis= 6  deg  T Wave Axis= 183  deg  - ABNORMAL ECG -  Sinus rhythm  Short NC interval  Abnormal T, consider ischemia, diffuse leads  NO PRIOR TRACING AVAILABLE FOR COMPARISON  Electronically Signed By: Mc Humphries ([facility]) 15-Aug-2023 16:35:38  Date and Time of Study: 2023-08-15 16:06:06         Reviewed     Assessment & Plan     Acute on chronic anemia  -Hemoglobin at baseline approximately 8-9, currently 5.9  -Discussed prior to admission with GI in passing and recommended pill endoscopy, unfortunately when ED tried to transfer patient, they were informed this is only down outpatient, therefore she will have to be stabilized here first. Will ask GI for assistance in case other procedure is required and/or patient cannot be stabilized with simple transfusion.   -We will transfuse patient with 2 units packed red blood cells and check H&H thereafter  -Anemia is currently macrocytic.   -BUN elevated likely showing active bleeding, will follow CBC closely.      Elevated troponin likely secondary to above  -Initial troponin was 26  -Monitor, suspected to be due to anemia. NO chest pain reported.      MARTA secondary to anemia   -Suspect secondary to hypovolemia with active bleeding  -Creatinine at baseline appears to be 1.2-1.3, currently 2.14  -Monitor         Chronic stable conditions to be managed with home medications include the following conditions listed below. Also please note: Every effort was made to accurately obtain patient's home medications. This was done utilizing extensive chart review, ED documentation, recent pharmacy records, and where possible thorough discussion with the patient if medications were known by the patient. I have reviewed and edited the patient's home medications as per my efforts above and current med list can be found within home medications portion of this electronic medical record and is accurate as possible as of 08/01/23      Osteoporosis-resume alendronate on discharge     Hypertension-continue amlodipine and metoprolol     Iron deficiency anemia-continue home iron supplement, treating anemia otherwise as above     Peripheral neuropathy-continue home gabapentin     Depression and loss of appetite-continue mirtazapine     GERD-utilize Protonix for home PPI substitute      Hyperlipidemia-continue pravastatin     DVT PPX: SCDs        I discussed the patient's findings and my recommendations with patient     Alberto Newman,   08/15/23  17:22 EDT    Time: 37 mins     At [facility], we believe that sharing information builds trust and better relationships. You are receiving this note because you recently visited [facility]. It is possible you will see health information before a provider has talked with you about it. This kind of information can be easy to misunderstand. To help you fully understand what it means for your health, we urge you to discuss this note with your provider.

## 2023-08-16 ENCOUNTER — APPOINTMENT (OUTPATIENT)
Dept: NUCLEAR MEDICINE | Facility: HOSPITAL | Age: 88
End: 2023-08-16
Payer: MEDICARE

## 2023-08-16 LAB
BASOPHILS # BLD AUTO: 0.05 10*3/MM3 (ref 0–0.2)
BASOPHILS # BLD AUTO: 0.06 10*3/MM3 (ref 0–0.2)
BASOPHILS NFR BLD AUTO: 0.5 % (ref 0–1.5)
BASOPHILS NFR BLD AUTO: 0.5 % (ref 0–1.5)
BH BB BLOOD EXPIRATION DATE: NORMAL
BH BB BLOOD EXPIRATION DATE: NORMAL
BH BB BLOOD TYPE BARCODE: 9500
BH BB BLOOD TYPE BARCODE: 9500
BH BB DISPENSE STATUS: NORMAL
BH BB DISPENSE STATUS: NORMAL
BH BB PRODUCT CODE: NORMAL
BH BB PRODUCT CODE: NORMAL
BH BB UNIT NUMBER: NORMAL
BH BB UNIT NUMBER: NORMAL
CROSSMATCH INTERPRETATION: NORMAL
CROSSMATCH INTERPRETATION: NORMAL
DEPRECATED RDW RBC AUTO: 73.3 FL (ref 37–54)
DEPRECATED RDW RBC AUTO: 79.1 FL (ref 37–54)
EOSINOPHIL # BLD AUTO: 0.18 10*3/MM3 (ref 0–0.4)
EOSINOPHIL # BLD AUTO: 0.2 10*3/MM3 (ref 0–0.4)
EOSINOPHIL NFR BLD AUTO: 1.5 % (ref 0.3–6.2)
EOSINOPHIL NFR BLD AUTO: 1.6 % (ref 0.3–6.2)
ERYTHROCYTE [DISTWIDTH] IN BLOOD BY AUTOMATED COUNT: 22.4 % (ref 12.3–15.4)
ERYTHROCYTE [DISTWIDTH] IN BLOOD BY AUTOMATED COUNT: 23.8 % (ref 12.3–15.4)
HCT VFR BLD AUTO: 24.5 % (ref 34–46.6)
HCT VFR BLD AUTO: 25.4 % (ref 34–46.6)
HCT VFR BLD AUTO: 25.5 % (ref 34–46.6)
HCT VFR BLD AUTO: 28.9 % (ref 34–46.6)
HGB BLD-MCNC: 7.4 G/DL (ref 12–15.9)
HGB BLD-MCNC: 7.7 G/DL (ref 12–15.9)
HGB BLD-MCNC: 7.8 G/DL (ref 12–15.9)
HGB BLD-MCNC: 9.2 G/DL (ref 12–15.9)
IMM GRANULOCYTES # BLD AUTO: 0.08 10*3/MM3 (ref 0–0.05)
IMM GRANULOCYTES # BLD AUTO: 0.12 10*3/MM3 (ref 0–0.05)
IMM GRANULOCYTES NFR BLD AUTO: 0.7 % (ref 0–0.5)
IMM GRANULOCYTES NFR BLD AUTO: 0.9 % (ref 0–0.5)
LYMPHOCYTES # BLD AUTO: 2.31 10*3/MM3 (ref 0.7–3.1)
LYMPHOCYTES # BLD AUTO: 2.74 10*3/MM3 (ref 0.7–3.1)
LYMPHOCYTES NFR BLD AUTO: 20.9 % (ref 19.6–45.3)
LYMPHOCYTES NFR BLD AUTO: 21.2 % (ref 19.6–45.3)
MCH RBC QN AUTO: 29.5 PG (ref 26.6–33)
MCH RBC QN AUTO: 30.5 PG (ref 26.6–33)
MCHC RBC AUTO-ENTMCNC: 30.2 G/DL (ref 31.5–35.7)
MCHC RBC AUTO-ENTMCNC: 30.7 G/DL (ref 31.5–35.7)
MCV RBC AUTO: 97.6 FL (ref 79–97)
MCV RBC AUTO: 99.2 FL (ref 79–97)
MONOCYTES # BLD AUTO: 1.15 10*3/MM3 (ref 0.1–0.9)
MONOCYTES # BLD AUTO: 1.18 10*3/MM3 (ref 0.1–0.9)
MONOCYTES NFR BLD AUTO: 10.4 % (ref 5–12)
MONOCYTES NFR BLD AUTO: 9.1 % (ref 5–12)
NEUTROPHILS NFR BLD AUTO: 65.9 % (ref 42.7–76)
NEUTROPHILS NFR BLD AUTO: 66.8 % (ref 42.7–76)
NEUTROPHILS NFR BLD AUTO: 7.26 10*3/MM3 (ref 1.7–7)
NEUTROPHILS NFR BLD AUTO: 8.65 10*3/MM3 (ref 1.7–7)
NRBC BLD AUTO-RTO: 0.3 /100 WBC (ref 0–0.2)
NRBC BLD AUTO-RTO: 0.4 /100 WBC (ref 0–0.2)
PLATELET # BLD AUTO: 237 10*3/MM3 (ref 140–450)
PLATELET # BLD AUTO: 278 10*3/MM3 (ref 140–450)
PMV BLD AUTO: 11 FL (ref 6–12)
PMV BLD AUTO: 11.6 FL (ref 6–12)
RBC # BLD AUTO: 2.51 10*6/MM3 (ref 3.77–5.28)
RBC # BLD AUTO: 2.56 10*6/MM3 (ref 3.77–5.28)
UNIT  ABO: NORMAL
UNIT  ABO: NORMAL
UNIT  RH: NORMAL
UNIT  RH: NORMAL
WBC NRBC COR # BLD: 11.03 10*3/MM3 (ref 3.4–10.8)
WBC NRBC COR # BLD: 12.95 10*3/MM3 (ref 3.4–10.8)

## 2023-08-16 PROCEDURE — 36430 TRANSFUSION BLD/BLD COMPNT: CPT

## 2023-08-16 PROCEDURE — 86900 BLOOD TYPING SEROLOGIC ABO: CPT

## 2023-08-16 PROCEDURE — G0378 HOSPITAL OBSERVATION PER HR: HCPCS

## 2023-08-16 PROCEDURE — 78278 ACUTE GI BLOOD LOSS IMAGING: CPT

## 2023-08-16 PROCEDURE — 85025 COMPLETE CBC W/AUTO DIFF WBC: CPT | Performed by: INTERNAL MEDICINE

## 2023-08-16 PROCEDURE — 85018 HEMOGLOBIN: CPT | Performed by: FAMILY MEDICINE

## 2023-08-16 PROCEDURE — 85014 HEMATOCRIT: CPT | Performed by: INTERNAL MEDICINE

## 2023-08-16 PROCEDURE — 99232 SBSQ HOSP IP/OBS MODERATE 35: CPT | Performed by: INTERNAL MEDICINE

## 2023-08-16 PROCEDURE — 99214 OFFICE O/P EST MOD 30 MIN: CPT | Performed by: STUDENT IN AN ORGANIZED HEALTH CARE EDUCATION/TRAINING PROGRAM

## 2023-08-16 PROCEDURE — 85014 HEMATOCRIT: CPT | Performed by: FAMILY MEDICINE

## 2023-08-16 PROCEDURE — 85018 HEMOGLOBIN: CPT | Performed by: INTERNAL MEDICINE

## 2023-08-16 PROCEDURE — 0 TECHNETIUM LABELED RED BLOOD CELLS: Performed by: INTERNAL MEDICINE

## 2023-08-16 PROCEDURE — 94799 UNLISTED PULMONARY SVC/PX: CPT

## 2023-08-16 PROCEDURE — P9016 RBC LEUKOCYTES REDUCED: HCPCS

## 2023-08-16 PROCEDURE — A9560 TC99M LABELED RBC: HCPCS | Performed by: INTERNAL MEDICINE

## 2023-08-16 RX ORDER — SODIUM CHLORIDE 9 MG/ML
INJECTION, SOLUTION INTRAVENOUS
Status: COMPLETED
Start: 2023-08-16 | End: 2023-08-16

## 2023-08-16 RX ORDER — SODIUM CHLORIDE 9 MG/ML
INJECTION, SOLUTION INTRAVENOUS
Status: DISPENSED
Start: 2023-08-16 | End: 2023-08-17

## 2023-08-16 RX ADMIN — AMLODIPINE BESYLATE 5 MG: 5 TABLET ORAL at 08:40

## 2023-08-16 RX ADMIN — Medication 10 ML: at 08:41

## 2023-08-16 RX ADMIN — SODIUM CHLORIDE 250 ML: 9 INJECTION, SOLUTION INTRAVENOUS at 15:23

## 2023-08-16 RX ADMIN — SODIUM CHLORIDE 40 ML: 9 INJECTION, SOLUTION INTRAVENOUS at 18:32

## 2023-08-16 RX ADMIN — METOPROLOL TARTRATE 12.5 MG: 25 TABLET, FILM COATED ORAL at 20:38

## 2023-08-16 RX ADMIN — METOPROLOL TARTRATE 12.5 MG: 25 TABLET, FILM COATED ORAL at 08:40

## 2023-08-16 RX ADMIN — AMIODARONE HYDROCHLORIDE 200 MG: 200 TABLET ORAL at 08:40

## 2023-08-16 RX ADMIN — GABAPENTIN 100 MG: 100 CAPSULE ORAL at 08:40

## 2023-08-16 RX ADMIN — PANTOPRAZOLE SODIUM 40 MG: 40 TABLET, DELAYED RELEASE ORAL at 18:28

## 2023-08-16 RX ADMIN — PRAVASTATIN SODIUM 80 MG: 20 TABLET ORAL at 08:39

## 2023-08-16 RX ADMIN — MIRTAZAPINE 15 MG: 15 TABLET, FILM COATED ORAL at 20:38

## 2023-08-16 RX ADMIN — Medication 400 UNITS: at 08:40

## 2023-08-16 RX ADMIN — Medication 10 ML: at 20:38

## 2023-08-16 RX ADMIN — TECHNETIUM TC 99M-LABELED RED BLOOD CELLS 1 DOSE: KIT at 16:19

## 2023-08-16 RX ADMIN — Medication 400 UNITS: at 20:38

## 2023-08-16 RX ADMIN — PANTOPRAZOLE SODIUM 40 MG: 40 TABLET, DELAYED RELEASE ORAL at 08:40

## 2023-08-16 RX ADMIN — FERROUS SULFATE TAB EC 324 MG (65 MG FE EQUIVALENT) 324 MG: 324 (65 FE) TABLET DELAYED RESPONSE at 08:40

## 2023-08-16 RX ADMIN — GABAPENTIN 100 MG: 100 CAPSULE ORAL at 20:38

## 2023-08-16 RX ADMIN — Medication 400 MG: at 08:40

## 2023-08-16 NOTE — PROGRESS NOTES
"SERVICE: [facility] HOSPITALIST    CONSULTANTS: Gastroenterology    CHIEF COMPLAINT: Weakness    SUBJECTIVE: Patient seen and examined at bedside. Patient reports no acute issues, other than being awoken from sleep this morning by staff and myself.  She reports her weakness has improved as has her shortness of breath.  Nursing staff working on liberating patient from oxygen and she is 96% on 1 L at this time.  No other acute issues reported.     OBJECTIVE:    Physical exam is largely unchanged from previous exam, except where documented below, examination is accurate as of 8/16/2023    Physical Exam:  General: Patient is awake and alert.  Elderly female. No acute distress noted.   HENT: Head is atraumatic, normocephalic. Hearing is grossly intact. Nose is without obvious congestion and appears patent. Neck is supple and trachea is midline.  Conjunctiva now appear normal.   Eyes: Vision is grossly intact. Pupils appear equal and round.   Cardiovascular: Heart has regular rate and rhythm with no murmurs, rubs or gallops noted.   Respiratory: Lungs are clear to ausculation without wheezes, rhonchi or rales.   Abdominal/GI: Soft, nontender, bowel sounds present. No rebound or guarding present.   Extremities: No peripheral edema noted.   Musculoskeletal: Spontaneous movement of bilateral upper and lower extremities against gravity noted. No signs of injury or deformity noted.   Skin: Warm and dry.   Psych: Mood and affect are appropriate. Cooperative with exam.   Neuro: No facial asymmetry noted. No focal deficits noted, hearing and vision are grossly intact.     /62 (BP Location: Right arm, Patient Position: Lying)   Pulse 66   Temp 97.5 øF (36.4 øC) (Oral)   Resp 16   Ht 157.5 cm (62.01\")   Wt 85.7 kg (188 lb 15 oz)   SpO2 97%   BMI 34.55 kg/mý     MEDS/LABS REVIEWED AND ORDERED    amiodarone, 200 mg, Oral, Daily  amLODIPine, 5 mg, Oral, Daily  cholecalciferol, 400 Units, Oral, " BID  ferrous sulfate, 324 mg, Oral, Daily With Breakfast  gabapentin, 100 mg, Oral, BID  magnesium oxide, 400 mg, Oral, Daily  metoprolol tartrate, 12.5 mg, Oral, Q12H  mirtazapine, 15 mg, Oral, Nightly  pantoprazole, 40 mg, Oral, BID AC  pravastatin, 80 mg, Oral, Daily  sodium chloride, 10 mL, Intravenous, Q12H  sodium chloride, , ,           LAB/DIAGNOSTICS:    Lab Results (last 24 hours)       Procedure Component Value Units Date/Time    CBC Auto Differential [[id number]]  (Abnormal) Collected: 08/16/23 0408    Specimen: Blood Updated: 08/16/23 0446     WBC 11.03 10*3/mm3      RBC 2.51 10*6/mm3      Hemoglobin 7.4 g/dL      Hematocrit 24.5 %      MCV 97.6 fL      MCH 29.5 pg      MCHC 30.2 g/dL      RDW 22.4 %      RDW-SD 73.3 fl      MPV 11.0 fL      Platelets 237 10*3/mm3      Neutrophil % 65.9 %      Lymphocyte % 20.9 %      Monocyte % 10.4 %      Eosinophil % 1.6 %      Basophil % 0.5 %      Immature Grans % 0.7 %      Neutrophils, Absolute 7.26 10*3/mm3      Lymphocytes, Absolute 2.31 10*3/mm3      Monocytes, Absolute 1.15 10*3/mm3      Eosinophils, Absolute 0.18 10*3/mm3      Basophils, Absolute 0.05 10*3/mm3      Immature Grans, Absolute 0.08 10*3/mm3      nRBC 0.4 /100 WBC     High Sensitivity Troponin T 2Hr [[id number]]  (Abnormal) Collected: 08/15/23 1819    Specimen: Blood Updated: 08/15/23 1838     HS Troponin T 25 ng/L      Troponin T Delta -1 ng/L     Narrative:      High Sensitive Troponin T Reference Range:  <10.0 ng/L- Negative Female for AMI  <15.0 ng/L- Negative Male for AMI  >=10 - Abnormal Female indicating possible myocardial injury.  >=15 - Abnormal Male indicating possible myocardial injury.   Clinicians would have to utilize clinical acumen, EKG, Troponin, and serial changes to determine if it is an Acute Myocardial Infarction or myocardial injury due to an underlying chronic condition.         Urinalysis, Microscopic Only - Urine, Clean Catch [[id number]]  (Abnormal) Collected: 08/15/23  1710    Specimen: Urine, Clean Catch Updated: 08/15/23 1736     RBC, UA None Seen /HPF      WBC, UA 13-20 /HPF      Bacteria, UA 4+ /HPF      Squamous Epithelial Cells, UA 0-2 /HPF      Hyaline Casts, UA None Seen /LPF      Methodology Manual Light Microscopy    Urine Culture - Urine, Urine, Clean Catch [[id number]] Collected: 08/15/23 1710    Specimen: Urine, Clean Catch Updated: 08/15/23 1736    Urinalysis With Culture If Indicated - Urine, Clean Catch [[id number]]  (Abnormal) Collected: 08/15/23 1710    Specimen: Urine, Clean Catch Updated: 08/15/23 1724     Color, UA Dark Yellow     Comment: Any Substance that causes an abnormal urine color can alter the accuracy of the chemical reactions.  Corrected result. Previous result was Green on 8/15/2023 at 1722 EDT.        Appearance, UA Cloudy     pH, UA 5.5     Specific Gravity, UA 1.015     Glucose, UA Negative     Ketones, UA Negative     Bilirubin, UA Negative     Blood, UA Negative     Protein, UA Negative     Leuk Esterase, UA Small (1+)     Nitrite, UA Negative     Urobilinogen, UA 0.2 E.U./dL    Narrative:      In absence of clinical symptoms, the presence of pyuria, bacteria, and/or nitrites on the urinalysis result does not correlate with infection.    Protime-INR [[id number]]  (Abnormal) Collected: 08/15/23 1557    Specimen: Blood Updated: 08/15/23 1718     Protime 31.3 Seconds      INR 3.09    Narrative:      Therapeutic Ranges for INR: 2.0-3.0 (PT 20-30)                              2.5-3.5 (PT 25-34)    CBC & Differential [[id number]]  (Abnormal) Collected: 08/15/23 1557    Specimen: Blood Updated: 08/15/23 1659    Narrative:      The following orders were created for panel order CBC & Differential.  Procedure                               Abnormality         Status                     ---------                               -----------         ------                     CBC Auto Differential[[id number]]        Abnormal            Final result                Scan Slide[[id number]]                                       Final result                 Please view results for these tests on the individual orders.    Scan Slide [[id number]] Collected: 08/15/23 1557    Specimen: Blood Updated: 08/15/23 1659     RBC Morphology Normal     WBC Morphology Normal     Platelet Estimate Increased    Blood Gas, Arterial - [[id number]]  (Abnormal) Collected: 08/15/23 1650    Specimen: Arterial Blood Updated: 08/15/23 1655     Site Right Radial     Silver's Test Positive     pH, Arterial 7.490 pH units      Comment: 83 Value above reference range        pCO2, Arterial 42.3 mm Hg      pO2, Arterial 96.6 mm Hg      HCO3, Arterial 32.2 mmol/L      Comment: 83 Value above reference range        Base Excess, Arterial 8.1 mmol/L      Comment: 83 Value above reference range        O2 Saturation, Arterial 99.1 %      Comment: 83 Value above reference range        Hemoglobin, Blood Gas 5.4 g/dL      Temperature 37.0 C      Barometric Pressure for Blood Gas 738 mmHg      Modality Nasal Cannula     Flow Rate 2.0 lpm      Collected by [id number]     Comment: Meter: [id number]     :  [id number]        pCO2, Temperature Corrected 42.3 mm Hg      pH, Temp Corrected 7.490 pH Units      pO2, Temperature Corrected 96.6 mm Hg     CBC Auto Differential [[id number]]  (Abnormal) Collected: 08/15/23 1557    Specimen: Blood Updated: 08/15/23 1634     WBC 13.00 10*3/mm3      RBC 1.91 10*6/mm3      Hemoglobin 5.9 g/dL      Hematocrit 20.4 %      .8 fL      MCH 30.9 pg      MCHC 28.9 g/dL      RDW 21.2 %      RDW-SD 80.0 fl      MPV 11.1 fL      Platelets 337 10*3/mm3      Neutrophil % 67.4 %      Lymphocyte % 18.8 %      Monocyte % 11.9 %      Eosinophil % 0.8 %      Basophil % 0.3 %      Immature Grans % 0.8 %      Neutrophils, Absolute 8.76 10*3/mm3      Lymphocytes, Absolute 2.45 10*3/mm3      Monocytes, Absolute 1.55 10*3/mm3      Eosinophils, Absolute 0.10 10*3/mm3      Basophils, Absolute 0.04  "10*3/mm3      Immature Grans, Absolute 0.10 10*3/mm3      nRBC 0.5 /100 WBC     Procalcitonin [[id number]]  (Normal) Collected: 08/15/23 1557    Specimen: Blood Updated: 08/15/23 1632     Procalcitonin 0.16 ng/mL     Narrative:      As a Marker for Sepsis (Non-Neonates):    1. <0.5 ng/mL represents a low risk of severe sepsis and/or septic shock.  2. >2 ng/mL represents a high risk of severe sepsis and/or septic shock.    As a Marker for Lower Respiratory Tract Infections that require antibiotic therapy:    PCT on Admission    Antibiotic Therapy       6-12 Hrs later    >0.5                Strongly Recommended  >0.25 - <0.5        Recommended   0.1 - 0.25          Discouraged              Remeasure/reassess PCT  <0.1                Strongly Discouraged     Remeasure/reassess PCT    As 28 day mortality risk marker: \"Change in Procalcitonin Result\" (>80% or <=80%) if Day 0 (or Day 1) and Day 4 values are available. Refer to http://www.[facility]-pct-calculator.com    Change in PCT <=80%  A decrease of PCT levels below or equal to 80% defines a positive change in PCT test result representing a higher risk for 28-day all-cause mortality of patients diagnosed with severe sepsis for septic shock.    Change in PCT >80%  A decrease of PCT levels of more than 80% defines a negative change in PCT result representing a lower risk for 28-day all-cause mortality of patients diagnosed with severe sepsis or septic shock.       High Sensitivity Troponin T [[id number]]  (Abnormal) Collected: 08/15/23 1557    Specimen: Blood Updated: 08/15/23 1631     HS Troponin T 26 ng/L     Narrative:      High Sensitive Troponin T Reference Range:  <10.0 ng/L- Negative Female for AMI  <15.0 ng/L- Negative Male for AMI  >=10 - Abnormal Female indicating possible myocardial injury.  >=15 - Abnormal Male indicating possible myocardial injury.   Clinicians would have to utilize clinical acumen, EKG, Troponin, and serial changes to determine if it is an " Acute Myocardial Infarction or myocardial injury due to an underlying chronic condition.         Comprehensive Metabolic Panel [[id number]]  (Abnormal) Collected: 08/15/23 1557    Specimen: Blood Updated: 08/15/23 1629     Glucose 119 mg/dL      BUN 58 mg/dL      Creatinine 2.14 mg/dL      Sodium 140 mmol/L      Potassium 3.3 mmol/L      Chloride 97 mmol/L      CO2 30.6 mmol/L      Calcium 9.0 mg/dL      Total Protein 6.0 g/dL      Albumin 4.0 g/dL      ALT (SGPT) 7 U/L      AST (SGOT) 13 U/L      Alkaline Phosphatase 68 U/L      Total Bilirubin 0.3 mg/dL      Globulin 2.0 gm/dL      A/G Ratio 2.0 g/dL      BUN/Creatinine Ratio 27.1     Anion Gap 12.4 mmol/L      eGFR 21.3 mL/min/1.73     Narrative:      GFR Normal >60  Chronic Kidney Disease <60  Kidney Failure <15    The GFR formula is only valid for adults with stable renal function between ages 18 and 70.          ECG 12 Lead Dyspnea   Final Result   HEART RATE= 66  bpm   RR Interval= 904  ms   MS Interval= 53  ms   P Horizontal Axis=   deg   P Front Axis= 0  deg   QRSD Interval= 104  ms   QT Interval= 455  ms   QRS Axis= 6  deg   T Wave Axis= 183  deg   - ABNORMAL ECG -   Sinus rhythm   Short MS interval   Abnormal T, consider ischemia, diffuse leads   NO PRIOR TRACING AVAILABLE FOR COMPARISON   Electronically Signed By: Mc Humphries ([facility]) 15-Aug-2023 16:35:38   Date and Time of Study: 2023-08-15 16:06:06        Results for orders placed during the hospital encounter of 02/28/18    Adult Transthoracic Echo Complete W/ Cont if Necessary Per Protocol    Interpretation Summary  ú Left atrial cavity size is mildly dilated.  ú There is calcification of the aortic valve.  ú Mild-to-moderate mitral valve regurgitation is present  ú Mild tricuspid valve regurgitation is present.  ú Left Ventricle: Calculated EF = 62.9%.  ú There is no evidence of pericardial effusion    XR Chest 2 View    Result Date: 8/15/2023  Cardiomegaly. Mild prominence of the pulmonary vascularity  centrally. Similar findings to prior study of 8/1/2023.   This report was finalized on 8/15/2023 4:28 PM by Dr. Po Ramsey MD.         ASSESSMENT/PLAN:  Please note portions of this assessment/plan may have been copied and pasted, but I have personally seen this patient and reviewed each line of this assessment and plan for accuracy and made updates to reflect my necessary changes on 8/16/2023    Acute on chronic anemia  -Hemoglobin has increased to 7.4 status post 2 unit packed red blood cell transfusion.   -GI consulted for assistance.  Initially had plan to have patient have pill endoscopy, only available as outpatient, therefore patient will need to be stabilized here prior to discharge to pursue pill endoscopy.   -Anemia is currently macrocytic.   -BUN elevated on admission, therefore concern for active bleeding.   -Patient has had previous EGD and colonoscopy.  Most recent push endoscopy revealed no acute bleed.   -Patient remains on clear liquid diet for now.   -Continue to monitor H&H closely.       Elevated troponin  -Mildly elevated, likely secondary to anemia, has down trended.   -No reports of chest pain and no acute ischemic changes on EKG.     Osteoporosis-resume alendronate on discharge     Hypertension-continue amlodipine and metoprolol     Iron deficiency anemia-continue home iron supplement, treating anemia otherwise as above     Peripheral neuropathy-continue home gabapentin     Depression and loss of appetite-continue mirtazapine     GERD-continue Protonix.  Increased to twice daily dosing.      Hyperlipidemia-continue pravastatin     DVT PPX: SCDs      PLAN FOR DISPOSITION: ALEXANDRA Newman DO  Hospitalist, [facility] [place]  08/16/23  07:24 EDT    At [facility], we believe that sharing information builds trust and better relationships. You are receiving this note because you recently visited [facility]. It is possible you will see health information before a provider has  "talked with you about it. This kind of information can be easy to misunderstand. To help you fully understand what it means for your health, we urge you to discuss this note with your provider.    \"Dictated utilizing Dragon dictation\"    "

## 2023-08-16 NOTE — PLAN OF CARE
Goal Outcome Evaluation:  Plan of Care Reviewed With: patient             Problem: Adult Inpatient Plan of Care  Goal: Plan of Care Review  Outcome: Ongoing, Progressing  Flowsheets (Taken 8/16/2023 4113)  Plan of Care Reviewed With: patient  Outcome Evaluation: VSS, o2 titrated, now on ra, up with assist x1 to recliner, denies pain/discomfort, 2U prbc ordered, 1st unit infusing for hgb 7.8, off unit fro GI blood loss study.

## 2023-08-16 NOTE — PAYOR COMM NOTE
"Aria Connell \"[place]\" (89 y.o. Female)     ATTN: NURSE REVIEWER  RE: OBSERVATION ADMIT- AUTH REQUEST  FOR PROCEDURES  REF# [id number]  PLS REPLY TO TEOFILO MURPHY [phone] FAX# [phone]               Date of Birth   09/27/1930    Social Security Number          Home Phone   [phone]    MRN   [id number]       Rastafarian   Mormon    Marital Status   Single                            Admission Date   8/15/23    Admission Type   Emergency    Admitting Provider   Alberto Newman DO    Attending Provider   Alberto Newman DO    Department, Room/Bed   [facility] SURG, 1410/1       Discharge Date       Discharge Disposition       Discharge Destination                                 Attending Provider: Alberto Newman DO    Allergies: Clinoril [Sulindac], Codeine, Coumadin [Warfarin Sodium], Ibuprofen-oxycodone [Oxycodone-ibuprofen], Keflex [Cephalexin], Mydriacyl [Tropicamide], Don-synephrine [Phenylephrine], Penicillins, Phenylephrine Hcl (Pressors), Sulfa Antibiotics, Zantac [Ranitidine Hcl]    Isolation: None   Infection: None   Code Status: CPR    Ht: 157.5 cm (62.01\")   Wt: 85.7 kg (188 lb 15 oz)    Admission Cmt: None   Principal Problem: Anemia [D64.9]                   Active Insurance as of 8/15/2023       Primary Coverage       Payor Plan Insurance Group Employer/Plan Group    [facility] MEDICARE REPLACEMENT [employer] MEDICARE REPLACEMENT        Payor Plan Address Payor Plan Phone Number Payor Plan Fax Number Effective Dates    [address] [address] [phone]  5/25/2022 - None Entered    [facility] [id number]         Subscriber Name Subscriber Birth Date Member ID       ARIA CONNELL 9/27/1930 [id number]               Secondary Coverage       Payor Plan Insurance Group Employer/Plan Group    [place]        Payor Plan Address Payor Plan Phone Number Payor Plan Fax Number Effective Dates    [address] [phone]  5/1/2022 - None " Entered    [facility] [id number]         Subscriber Name Subscriber Birth Date Member ID       VETO CONNELL 9/27/1930 [id number]                     Emergency Contacts        (Rel.) Home Phone Work Phone Mobile Phone    Ina Goodman (Power of ) -- -- [phone]                 History & Physical        Trent Alberto DO LAINA at 08/15/23 1722          [facility] HOSPITALIST     PCP: Jimena Aj MD    CODE status: Full    CHIEF COMPLAINT: Weakness    HISTORY OF PRESENT ILLNESS:    Patient is a 89-year-old female very well-known to me in our facility from previous admissions for acute on chronic anemia.  Her most recent admission was the first week of August 2023, in which she was underwent push endoscopy, no active bleeding found, she was therefore given IV iron transfusions and hemoglobin stabilized.  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  Therefore prior to admission this time I reached out to Dr. Vadim Carr in passing and was able to briefly discuss case with him, we mutually agreed patient should undergo pill endoscopy.  Unfortunately this is only done on an outpatient basis therefore admission was necessitated by need for stabilization of her hemoglobin of 5.9 today.  Patient reports today that she has had 4 to 5 days of increasing weakness and fatigue, similar to previous episodes where she has had low blood counts.  She reports she is short of breath with exertion but denies any chest pain.  She has not seen any gross blood in her bowel movements, but she does have chronically black stools and is still taking iron supplements.  Patient spoke to her PCP Dr. Aj who recommended she come back to the ED for evaluation.     2 units of packed red blood cells have been ordered for patient from ED.       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     On anticoagulant therapy     Osteoarthritis     Polycythemia     PVD (peripheral vascular disease)      Past Surgical History:   Procedure Laterality Date    APPENDECTOMY      CAROTID ENDARTERECTOMY Left     COLONOSCOPY N/A 03/03/2018    Procedure:  Colonoscopy to Terminal ileum with APC treatment for 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 and cold biopsy;  Surgeon: Terrie Carroll MD;  Location:  [facility];  Service:     COLONOSCOPY W/ POLYPECTOMY N/A 6/21/2023    Procedure: COLONOSCOPY WITH POLYPECTOMY AND APC;  Surgeon: Emiliano Rodriguez MD;  Location:  [facility];  Service: Gastroenterology;  Laterality: N/A;  transverse polyp-forcep  cecal time 1659  APC  DESCENDING POLYP  SIGMOID POLYP    ENDOSCOPY N/A 03/03/2018    Procedure: EGD with biopsy;  Surgeon: Terrie Carroll MD;  Location:  [facility];  Service:     ENDOSCOPY N/A 6/19/2023    Procedure: ESOPHAGOGASTRODUODENOSCOPY;  Surgeon: Emiliano Rodriguez MD;  Location: [facility];  Service: Gastroenterology;  Laterality: N/A;  Reflux, Gastritis    ENDOSCOPY N/A 8/3/2023    Procedure: ENTEROSCOPY SMALL BOWEL;  Surgeon: Vadim Carr MD;  Location: [facility] OR;  Service: Gastroenterology;  Laterality: N/A;  tattoo to small bowel 60cm    KNEE ARTHROSCOPY Right      No family history on file.  Social History     Tobacco Use    Smoking status: Never    Smokeless tobacco: Never   Vaping Use    Vaping Use: Never used   Substance Use Topics    Alcohol use: No    Drug use: No     (Not in a hospital admission)    Allergies:  Clinoril [sulindac], Codeine, Coumadin [warfarin sodium], Ibuprofen-oxycodone [oxycodone-ibuprofen], Keflex [cephalexin], Mydriacyl [tropicamide], Don-synephrine [phenylephrine], Penicillins, Phenylephrine  "hcl (pressors), Sulfa antibiotics, and Zantac [ranitidine hcl]    Immunization History   Administered Date(s) Administered    COVID-19 (PFIZER) Purple Cap Monovalent 01/06/2021, 01/27/2021, 12/09/2021, 07/14/2022    Influenza Seasonal Injectable 11/01/2018    Influenza, Unspecified 10/31/2017    Tdap 11/11/2021       REVIEW OF SYSTEMS:  Please see the above history of present illness for pertinent positives and negatives.  The remainder of the patient's systems have been reviewed and are negative.     Vital Signs  Temp:  [97.9 øF (36.6 øC)] 97.9 øF (36.6 øC)  Heart Rate:  [67-75] 67  Resp:  [20] 20  BP: (111-113)/(52-66) 111/52  Flowsheet Rows      Flowsheet Row First Filed Value   Admission Height 157.5 cm (62\") Documented at 08/15/2023 1544   Admission Weight 82.6 kg (182 lb) Documented at 08/15/2023 1544               Physical Exam:  General: Patient is awake and alert.  Elderly female. No acute distress noted.   HENT: Head is atraumatic, normocephalic. Hearing is grossly intact. Nose is without obvious congestion and appears patent. Neck is supple and trachea is midline.  Pale conjunctiva.   Eyes: Vision is grossly intact. Pupils appear equal and round.   Cardiovascular: Heart has regular rate and rhythm with no murmurs, rubs or gallops noted.   Respiratory: Lungs are clear to ausculation without wheezes, rhonchi or rales.   Abdominal/GI: Soft, nontender, bowel sounds present. No rebound or guarding present.   Extremities: No peripheral edema noted.   Musculoskeletal: Spontaneous movement of bilateral upper and lower extremities against gravity noted. No signs of injury or deformity noted.   Skin: Warm and dry.   Psych: Mood and affect are appropriate. Cooperative with exam.   Neuro: No facial asymmetry noted. No focal deficits noted, hearing and vision are grossly intact.    Emotional Behavior: all of the following were found to be normal   Judgment and Insight   Mental Status   Memory   Mood and Affect: " neither of the following found acutely        Depression                 Anxiety     Debilities: no signs of the following found    Physical Weakness     Handicaps     Disabilities     Agitation         Results Review:    I reviewed the patient's new clinical results.  Lab Results (most recent)       Procedure Component Value Units Date/Time    Urinalysis With Culture If Indicated - Urine, Clean Catch [[id number]]  (Abnormal) Collected: 08/15/23 1710    Specimen: Urine, Clean Catch Updated: 08/15/23 1722     Color, UA Green     Comment: Any Substance that causes an abnormal urine color can alter the accuracy of the chemical reactions.        Appearance, UA Cloudy     pH, UA 5.5     Specific Gravity, UA 1.015     Glucose, UA Negative     Ketones, UA Negative     Bilirubin, UA Negative     Blood, UA Negative     Protein, UA Negative     Leuk Esterase, UA Small (1+)     Nitrite, UA Negative     Urobilinogen, UA 0.2 E.U./dL    Narrative:      In absence of clinical symptoms, the presence of pyuria, bacteria, and/or nitrites on the urinalysis result does not correlate with infection.    Urinalysis, Microscopic Only - Urine, Clean Catch [[id number]] Collected: 08/15/23 1710    Specimen: Urine, Clean Catch Updated: 08/15/23 1719    Protime-INR [[id number]]  (Abnormal) Collected: 08/15/23 1557    Specimen: Blood Updated: 08/15/23 1718     Protime 31.3 Seconds      INR 3.09    Narrative:      Therapeutic Ranges for INR: 2.0-3.0 (PT 20-30)                              2.5-3.5 (PT 25-34)    CBC & Differential [[id number]]  (Abnormal) Collected: 08/15/23 1557    Specimen: Blood Updated: 08/15/23 8309    Narrative:      The following orders were created for panel order CBC & Differential.  Procedure                               Abnormality         Status                     ---------                               -----------         ------                     CBC Auto Differential[[id number]]        Abnormal            Final result                Scan Slide[[id number]]                                       Final result                 Please view results for these tests on the individual orders.    Scan Slide [[id number]] Collected: 08/15/23 1557    Specimen: Blood Updated: 08/15/23 1659     RBC Morphology Normal     WBC Morphology Normal     Platelet Estimate Increased    Blood Gas, Arterial - [[id number]]  (Abnormal) Collected: 08/15/23 1650    Specimen: Arterial Blood Updated: 08/15/23 1655     Site Right Radial     Silver's Test Positive     pH, Arterial 7.490 pH units      Comment: 83 Value above reference range        pCO2, Arterial 42.3 mm Hg      pO2, Arterial 96.6 mm Hg      HCO3, Arterial 32.2 mmol/L      Comment: 83 Value above reference range        Base Excess, Arterial 8.1 mmol/L      Comment: 83 Value above reference range        O2 Saturation, Arterial 99.1 %      Comment: 83 Value above reference range        Hemoglobin, Blood Gas 5.4 g/dL      Temperature 37.0 C      Barometric Pressure for Blood Gas 738 mmHg      Modality Nasal Cannula     Flow Rate 2.0 lpm      Collected by [id number]     Comment: Meter: [id number]     :  [id number]        pCO2, Temperature Corrected 42.3 mm Hg      pH, Temp Corrected 7.490 pH Units      pO2, Temperature Corrected 96.6 mm Hg     CBC Auto Differential [[id number]]  (Abnormal) Collected: 08/15/23 1557    Specimen: Blood Updated: 08/15/23 1634     WBC 13.00 10*3/mm3      RBC 1.91 10*6/mm3      Hemoglobin 5.9 g/dL      Hematocrit 20.4 %      .8 fL      MCH 30.9 pg      MCHC 28.9 g/dL      RDW 21.2 %      RDW-SD 80.0 fl      MPV 11.1 fL      Platelets 337 10*3/mm3      Neutrophil % 67.4 %      Lymphocyte % 18.8 %      Monocyte % 11.9 %      Eosinophil % 0.8 %      Basophil % 0.3 %      Immature Grans % 0.8 %      Neutrophils, Absolute 8.76 10*3/mm3      Lymphocytes, Absolute 2.45 10*3/mm3      Monocytes, Absolute 1.55 10*3/mm3      Eosinophils, Absolute 0.10 10*3/mm3      Basophils,  "Absolute 0.04 10*3/mm3      Immature Grans, Absolute 0.10 10*3/mm3      nRBC 0.5 /100 WBC     Procalcitonin [[id number]]  (Normal) Collected: 08/15/23 1557    Specimen: Blood Updated: 08/15/23 1632     Procalcitonin 0.16 ng/mL     Narrative:      As a Marker for Sepsis (Non-Neonates):    1. <0.5 ng/mL represents a low risk of severe sepsis and/or septic shock.  2. >2 ng/mL represents a high risk of severe sepsis and/or septic shock.    As a Marker for Lower Respiratory Tract Infections that require antibiotic therapy:    PCT on Admission    Antibiotic Therapy       6-12 Hrs later    >0.5                Strongly Recommended  >0.25 - <0.5        Recommended   0.1 - 0.25          Discouraged              Remeasure/reassess PCT  <0.1                Strongly Discouraged     Remeasure/reassess PCT    As 28 day mortality risk marker: \"Change in Procalcitonin Result\" (>80% or <=80%) if Day 0 (or Day 1) and Day 4 values are available. Refer to [email or web address]    Change in PCT <=80%  A decrease of PCT levels below or equal to 80% defines a positive change in PCT test result representing a higher risk for 28-day all-cause mortality of patients diagnosed with severe sepsis for septic shock.    Change in PCT >80%  A decrease of PCT levels of more than 80% defines a negative change in PCT result representing a lower risk for 28-day all-cause mortality of patients diagnosed with severe sepsis or septic shock.       High Sensitivity Troponin T [[id number]]  (Abnormal) Collected: 08/15/23 1557    Specimen: Blood Updated: 08/15/23 1631     HS Troponin T 26 ng/L     Narrative:      High Sensitive Troponin T Reference Range:  <10.0 ng/L- Negative Female for AMI  <15.0 ng/L- Negative Male for AMI  >=10 - Abnormal Female indicating possible myocardial injury.  >=15 - Abnormal Male indicating possible myocardial injury.   Clinicians would have to utilize clinical acumen, EKG, Troponin, and serial changes to determine if " it is an Acute Myocardial Infarction or myocardial injury due to an underlying chronic condition.         Comprehensive Metabolic Panel [[id number]]  (Abnormal) Collected: 08/15/23 1557    Specimen: Blood Updated: 08/15/23 1629     Glucose 119 mg/dL      BUN 58 mg/dL      Creatinine 2.14 mg/dL      Sodium 140 mmol/L      Potassium 3.3 mmol/L      Chloride 97 mmol/L      CO2 30.6 mmol/L      Calcium 9.0 mg/dL      Total Protein 6.0 g/dL      Albumin 4.0 g/dL      ALT (SGPT) 7 U/L      AST (SGOT) 13 U/L      Alkaline Phosphatase 68 U/L      Total Bilirubin 0.3 mg/dL      Globulin 2.0 gm/dL      A/G Ratio 2.0 g/dL      BUN/Creatinine Ratio 27.1     Anion Gap 12.4 mmol/L      eGFR 21.3 mL/min/1.73     Narrative:      GFR Normal >60  Chronic Kidney Disease <60  Kidney Failure <15    The GFR formula is only valid for adults with stable renal function between ages 18 and 70.            Imaging Results (Most Recent)       Procedure Component Value Units Date/Time    XR Chest 2 View [[id number]] Collected: 08/15/23 1626     Updated: 08/15/23 1630    Narrative:      CHEST, 2 VIEWS     HISTORY:   Generalized weakness     Comparison:  8/1/2023     FINDINGS:  Lung volumes are diminished. The heart size is enlarged. Mediastinal  structures are midline.     Mild fullness of the pulmonary vascularity centrally. Minimally  increased interstitial markings bilaterally. No pleural fluid. No  pneumothorax.       Impression:      Cardiomegaly. Mild prominence of the pulmonary vascularity  centrally. Similar findings to prior study of 8/1/2023.        This report was finalized on 8/15/2023 4:28 PM by Dr. Po Ramsey MD.             Reviewed     ECG/EMG Results (most recent)       Procedure Component Value Units Date/Time    ECG 12 Lead Dyspnea [[id number]] Collected: 08/15/23 1606     Updated: 08/15/23 1635     QT Interval 455 ms     Narrative:      HEART RATE= 66  bpm  RR Interval= 904  ms  PA Interval= 53  ms  P Horizontal Axis=   deg  P  Front Axis= 0  deg  QRSD Interval= 104  ms  QT Interval= 455  ms  QRS Axis= 6  deg  T Wave Axis= 183  deg  - ABNORMAL ECG -  Sinus rhythm  Short SC interval  Abnormal T, consider ischemia, diffuse leads  NO PRIOR TRACING AVAILABLE FOR COMPARISON  Electronically Signed By: Mc Humphries ([facility]) 15-Aug-2023 16:35:38  Date and Time of Study: 2023-08-15 16:06:06        Reviewed     Assessment & Plan     Acute on chronic anemia  -Hemoglobin at baseline approximately 8-9, currently 5.9  -Discussed prior to admission with GI in passing and recommended pill endoscopy, unfortunately when ED tried to transfer patient, they were informed this is only down outpatient, therefore she will have to be stabilized here first. Will ask GI for assistance in case other procedure is required and/or patient cannot be stabilized with simple transfusion.   -We will transfuse patient with 2 units packed red blood cells and check H&H thereafter  -Anemia is currently macrocytic.   -BUN elevated likely showing active bleeding, will follow CBC closely.      Elevated troponin likely secondary to above  -Initial troponin was 26  -Monitor, suspected to be due to anemia. NO chest pain reported.      MARTA secondary to anemia   -Suspect secondary to hypovolemia with active bleeding  -Creatinine at baseline appears to be 1.2-1.3, currently 2.14  -Monitor         Chronic stable conditions to be managed with home medications include the following conditions listed below. Also please note: Every effort was made to accurately obtain patient's home medications. This was done utilizing extensive chart review, ED documentation, recent pharmacy records, and where possible thorough discussion with the patient if medications were known by the patient. I have reviewed and edited the patient's home medications as per my efforts above and current med list can be found within home medications portion of this electronic medical record and is accurate as possible as of  08/01/23      Osteoporosis-resume alendronate on discharge     Hypertension-continue amlodipine and metoprolol     Iron deficiency anemia-continue home iron supplement, treating anemia otherwise as above     Peripheral neuropathy-continue home gabapentin     Depression and loss of appetite-continue mirtazapine     GERD-utilize Protonix for home PPI substitute     Hyperlipidemia-continue pravastatin     DVT PPX: SCDs        I discussed the patient's findings and my recommendations with patient     Alberto Newman DO  08/15/23  17:22 EDT    Time: 37 mins     At [facility], we believe that sharing information builds trust and better relationships. You are receiving this note because you recently visited [facility]. It is possible you will see health information before a provider has talked with you about it. This kind of information can be easy to misunderstand. To help you fully understand what it means for your health, we urge you to discuss this note with your provider.        Electronically signed by Alberto Newman DO at 08/15/23 5628       Lab Results (last 24 hours)       Procedure Component Value Units Date/Time    CBC & Differential [[id number]] Collected: 08/16/23 1302    Specimen: Blood Updated: 08/16/23 1411    Narrative:      The following orders were created for panel order CBC & Differential.  Procedure                               Abnormality         Status                     ---------                               -----------         ------                     CBC Auto Differential[[id number]]                            In process                   Please view results for these tests on the individual orders.    CBC Auto Differential [[id number]] Collected: 08/16/23 1302    Specimen: Blood Updated: 08/16/23 1411    Hemoglobin & Hematocrit, Blood [[id number]]  (Abnormal) Collected: 08/16/23 1302    Specimen: Blood Updated: 08/16/23 1307     Hemoglobin 7.7 g/dL      Hematocrit 25.5 %      Urine Culture - Urine, Urine, Clean Catch [[id number]]  (Abnormal) Collected: 08/15/23 1710    Specimen: Urine, Clean Catch Updated: 08/16/23 1303     Urine Culture >100,000 CFU/mL Gram Negative Bacilli    Narrative:      Colonization of the urinary tract without infection is common. Treatment is discouraged unless the patient is symptomatic, pregnant, or undergoing an invasive urologic procedure.    CBC Auto Differential [[id number]]  (Abnormal) Collected: 08/16/23 0408    Specimen: Blood Updated: 08/16/23 0446     WBC 11.03 10*3/mm3      RBC 2.51 10*6/mm3      Hemoglobin 7.4 g/dL      Hematocrit 24.5 %      MCV 97.6 fL      MCH 29.5 pg      MCHC 30.2 g/dL      RDW 22.4 %      RDW-SD 73.3 fl      MPV 11.0 fL      Platelets 237 10*3/mm3      Neutrophil % 65.9 %      Lymphocyte % 20.9 %      Monocyte % 10.4 %      Eosinophil % 1.6 %      Basophil % 0.5 %      Immature Grans % 0.7 %      Neutrophils, Absolute 7.26 10*3/mm3      Lymphocytes, Absolute 2.31 10*3/mm3      Monocytes, Absolute 1.15 10*3/mm3      Eosinophils, Absolute 0.18 10*3/mm3      Basophils, Absolute 0.05 10*3/mm3      Immature Grans, Absolute 0.08 10*3/mm3      nRBC 0.4 /100 WBC     High Sensitivity Troponin T 2Hr [[id number]]  (Abnormal) Collected: 08/15/23 1819    Specimen: Blood Updated: 08/15/23 1838     HS Troponin T 25 ng/L      Troponin T Delta -1 ng/L     Narrative:      High Sensitive Troponin T Reference Range:  <10.0 ng/L- Negative Female for AMI  <15.0 ng/L- Negative Male for AMI  >=10 - Abnormal Female indicating possible myocardial injury.  >=15 - Abnormal Male indicating possible myocardial injury.   Clinicians would have to utilize clinical acumen, EKG, Troponin, and serial changes to determine if it is an Acute Myocardial Infarction or myocardial injury due to an underlying chronic condition.         Urinalysis, Microscopic Only - Urine, Clean Catch [[id number]]  (Abnormal) Collected: 08/15/23 1710    Specimen: Urine, Clean Catch Updated:  08/15/23 1736     RBC, UA None Seen /HPF      WBC, UA 13-20 /HPF      Bacteria, UA 4+ /HPF      Squamous Epithelial Cells, UA 0-2 /HPF      Hyaline Casts, UA None Seen /LPF      Methodology Manual Light Microscopy    Urinalysis With Culture If Indicated - Urine, Clean Catch [[id number]]  (Abnormal) Collected: 08/15/23 1710    Specimen: Urine, Clean Catch Updated: 08/15/23 1724     Color, UA Dark Yellow     Comment: Any Substance that causes an abnormal urine color can alter the accuracy of the chemical reactions.  Corrected result. Previous result was Green on 8/15/2023 at 1722 EDT.        Appearance, UA Cloudy     pH, UA 5.5     Specific Gravity, UA 1.015     Glucose, UA Negative     Ketones, UA Negative     Bilirubin, UA Negative     Blood, UA Negative     Protein, UA Negative     Leuk Esterase, UA Small (1+)     Nitrite, UA Negative     Urobilinogen, UA 0.2 E.U./dL    Narrative:      In absence of clinical symptoms, the presence of pyuria, bacteria, and/or nitrites on the urinalysis result does not correlate with infection.    Protime-INR [[id number]]  (Abnormal) Collected: 08/15/23 1557    Specimen: Blood Updated: 08/15/23 1718     Protime 31.3 Seconds      INR 3.09    Narrative:      Therapeutic Ranges for INR: 2.0-3.0 (PT 20-30)                              2.5-3.5 (PT 25-34)    CBC & Differential [[id number]]  (Abnormal) Collected: 08/15/23 1557    Specimen: Blood Updated: 08/15/23 0239    Narrative:      The following orders were created for panel order CBC & Differential.  Procedure                               Abnormality         Status                     ---------                               -----------         ------                     CBC Auto Differential[[id number]]        Abnormal            Final result               Scan Slide[[id number]]                                       Final result                 Please view results for these tests on the individual orders.    Scan Slide [[id number]]  Collected: 08/15/23 1557    Specimen: Blood Updated: 08/15/23 1659     RBC Morphology Normal     WBC Morphology Normal     Platelet Estimate Increased    Blood Gas, Arterial - [[id number]]  (Abnormal) Collected: 08/15/23 1650    Specimen: Arterial Blood Updated: 08/15/23 1655     Site Right Radial     Silver's Test Positive     pH, Arterial 7.490 pH units      Comment: 83 Value above reference range        pCO2, Arterial 42.3 mm Hg      pO2, Arterial 96.6 mm Hg      HCO3, Arterial 32.2 mmol/L      Comment: 83 Value above reference range        Base Excess, Arterial 8.1 mmol/L      Comment: 83 Value above reference range        O2 Saturation, Arterial 99.1 %      Comment: 83 Value above reference range        Hemoglobin, Blood Gas 5.4 g/dL      Temperature 37.0 C      Barometric Pressure for Blood Gas 738 mmHg      Modality Nasal Cannula     Flow Rate 2.0 lpm      Collected by [id number]     Comment: Meter: [id number]     :  [id number]        pCO2, Temperature Corrected 42.3 mm Hg      pH, Temp Corrected 7.490 pH Units      pO2, Temperature Corrected 96.6 mm Hg     CBC Auto Differential [[id number]]  (Abnormal) Collected: 08/15/23 1557    Specimen: Blood Updated: 08/15/23 1634     WBC 13.00 10*3/mm3      RBC 1.91 10*6/mm3      Hemoglobin 5.9 g/dL      Hematocrit 20.4 %      .8 fL      MCH 30.9 pg      MCHC 28.9 g/dL      RDW 21.2 %      RDW-SD 80.0 fl      MPV 11.1 fL      Platelets 337 10*3/mm3      Neutrophil % 67.4 %      Lymphocyte % 18.8 %      Monocyte % 11.9 %      Eosinophil % 0.8 %      Basophil % 0.3 %      Immature Grans % 0.8 %      Neutrophils, Absolute 8.76 10*3/mm3      Lymphocytes, Absolute 2.45 10*3/mm3      Monocytes, Absolute 1.55 10*3/mm3      Eosinophils, Absolute 0.10 10*3/mm3      Basophils, Absolute 0.04 10*3/mm3      Immature Grans, Absolute 0.10 10*3/mm3      nRBC 0.5 /100 WBC     Procalcitonin [[id number]]  (Normal) Collected: 08/15/23 1557    Specimen: Blood Updated: 08/15/23  "1632     Procalcitonin 0.16 ng/mL     Narrative:      As a Marker for Sepsis (Non-Neonates):    1. <0.5 ng/mL represents a low risk of severe sepsis and/or septic shock.  2. >2 ng/mL represents a high risk of severe sepsis and/or septic shock.    As a Marker for Lower Respiratory Tract Infections that require antibiotic therapy:    PCT on Admission    Antibiotic Therapy       6-12 Hrs later    >0.5                Strongly Recommended  >0.25 - <0.5        Recommended   0.1 - 0.25          Discouraged              Remeasure/reassess PCT  <0.1                Strongly Discouraged     Remeasure/reassess PCT    As 28 day mortality risk marker: \"Change in Procalcitonin Result\" (>80% or <=80%) if Day 0 (or Day 1) and Day 4 values are available. Refer to http://[email or web address]cal[facility]    Change in PCT <=80%  A decrease of PCT levels below or equal to 80% defines a positive change in PCT test result representing a higher risk for 28-day all-cause mortality of patients diagnosed with severe sepsis for septic shock.    Change in PCT >80%  A decrease of PCT levels of more than 80% defines a negative change in PCT result representing a lower risk for 28-day all-cause mortality of patients diagnosed with severe sepsis or septic shock.       High Sensitivity Troponin T [[id number]]  (Abnormal) Collected: 08/15/23 1557    Specimen: Blood Updated: 08/15/23 1631     HS Troponin T 26 ng/L     Narrative:      High Sensitive Troponin T Reference Range:  <10.0 ng/L- Negative Female for AMI  <15.0 ng/L- Negative Male for AMI  >=10 - Abnormal Female indicating possible myocardial injury.  >=15 - Abnormal Male indicating possible myocardial injury.   Clinicians would have to utilize clinical acumen, EKG, Troponin, and serial changes to determine if it is an Acute Myocardial Infarction or myocardial injury due to an underlying chronic condition.         Comprehensive Metabolic Panel [[id number]]  (Abnormal) Collected: 08/15/23 1557    " Specimen: Blood Updated: 08/15/23 1629     Glucose 119 mg/dL      BUN 58 mg/dL      Creatinine 2.14 mg/dL      Sodium 140 mmol/L      Potassium 3.3 mmol/L      Chloride 97 mmol/L      CO2 30.6 mmol/L      Calcium 9.0 mg/dL      Total Protein 6.0 g/dL      Albumin 4.0 g/dL      ALT (SGPT) 7 U/L      AST (SGOT) 13 U/L      Alkaline Phosphatase 68 U/L      Total Bilirubin 0.3 mg/dL      Globulin 2.0 gm/dL      A/G Ratio 2.0 g/dL      BUN/Creatinine Ratio 27.1     Anion Gap 12.4 mmol/L      eGFR 21.3 mL/min/1.73     Narrative:      GFR Normal >60  Chronic Kidney Disease <60  Kidney Failure <15    The GFR formula is only valid for adults with stable renal function between ages 18 and 70.          Imaging Results (Last 24 Hours)       Procedure Component Value Units Date/Time    XR Chest 2 View [[id number]] Collected: 08/15/23 1626     Updated: 08/15/23 1630    Narrative:      CHEST, 2 VIEWS     HISTORY:   Generalized weakness     Comparison:  8/1/2023     FINDINGS:  Lung volumes are diminished. The heart size is enlarged. Mediastinal  structures are midline.     Mild fullness of the pulmonary vascularity centrally. Minimally  increased interstitial markings bilaterally. No pleural fluid. No  pneumothorax.       Impression:      Cardiomegaly. Mild prominence of the pulmonary vascularity  centrally. Similar findings to prior study of 8/1/2023.        This report was finalized on 8/15/2023 4:28 PM by Dr. Po Ramsey MD.             ECG/EMG Results (last 24 hours)       Procedure Component Value Units Date/Time    ECG 12 Lead Dyspnea [[id number]] Collected: 08/15/23 1606     Updated: 08/15/23 1635     QT Interval 455 ms     Narrative:      HEART RATE= 66  bpm  RR Interval= 904  ms  AZ Interval= 53  ms  P Horizontal Axis=   deg  P Front Axis= 0  deg  QRSD Interval= 104  ms  QT Interval= 455  ms  QRS Axis= 6  deg  T Wave Axis= 183  deg  - ABNORMAL ECG -  Sinus rhythm  Short AZ interval  Abnormal T, consider ischemia, diffuse  leads  NO PRIOR TRACING AVAILABLE FOR COMPARISON  Electronically Signed By: Mc Humphries ([facility]) 15-Aug-2023 16:35:38  Date and Time of Study: 2023-08-15 16:06:06             Physician Progress Notes (last 24 hours)        Alberto Newman DO at 08/16/23 1413          Reviewed patient's chart again this morning due to concern patient continues to bleed despite no obvious cause or overt blood loss. INR was elevated yesterday in ER and review of previous notes shows she was taken off Xarelto at a previous discharge. I therefore decided to double check with patient to ensure she was not still taking a blood thinner. Unfortunately she immediately says she is still on a blood thinner. She reports her daughter takes care of all her medications, therefore she doesn't know her meds, but she knows for sure she's been on a blood thinner and was taking right up to admission. This would explain why we can get her Hgb stable after transfusions, but when she goes home she drops time after time.     I will contact daughter and instruct her to hold the Xarelto and or any other blood thinner product indefinitely.    Electronically signed by Alberto Newman DO at 08/16/23 5166       Alberto Newman DO at 08/16/23 0757          SERVICE: [facility] HOSPITALIST    CONSULTANTS: Gastroenterology    CHIEF COMPLAINT: Weakness    SUBJECTIVE: Patient seen and examined at bedside. Patient reports no acute issues, other than being awoken from sleep this morning by staff and myself.  She reports her weakness has improved as has her shortness of breath.  Nursing staff working on liberating patient from oxygen and she is 96% on 1 L at this time.  No other acute issues reported.     OBJECTIVE:    Physical exam is largely unchanged from previous exam, except where documented below, examination is accurate as of 8/16/2023    Physical Exam:  General: Patient is awake and alert.  Elderly female. No acute distress noted.  "  HENT: Head is atraumatic, normocephalic. Hearing is grossly intact. Nose is without obvious congestion and appears patent. Neck is supple and trachea is midline.  Conjunctiva now appear normal.   Eyes: Vision is grossly intact. Pupils appear equal and round.   Cardiovascular: Heart has regular rate and rhythm with no murmurs, rubs or gallops noted.   Respiratory: Lungs are clear to ausculation without wheezes, rhonchi or rales.   Abdominal/GI: Soft, nontender, bowel sounds present. No rebound or guarding present.   Extremities: No peripheral edema noted.   Musculoskeletal: Spontaneous movement of bilateral upper and lower extremities against gravity noted. No signs of injury or deformity noted.   Skin: Warm and dry.   Psych: Mood and affect are appropriate. Cooperative with exam.   Neuro: No facial asymmetry noted. No focal deficits noted, hearing and vision are grossly intact.     /62 (BP Location: Right arm, Patient Position: Lying)   Pulse 66   Temp 97.5 øF (36.4 øC) (Oral)   Resp 16   Ht 157.5 cm (62.01\")   Wt 85.7 kg (188 lb 15 oz)   SpO2 97%   BMI 34.55 kg/mý     MEDS/LABS REVIEWED AND ORDERED    amiodarone, 200 mg, Oral, Daily  amLODIPine, 5 mg, Oral, Daily  cholecalciferol, 400 Units, Oral, BID  ferrous sulfate, 324 mg, Oral, Daily With Breakfast  gabapentin, 100 mg, Oral, BID  magnesium oxide, 400 mg, Oral, Daily  metoprolol tartrate, 12.5 mg, Oral, Q12H  mirtazapine, 15 mg, Oral, Nightly  pantoprazole, 40 mg, Oral, BID AC  pravastatin, 80 mg, Oral, Daily  sodium chloride, 10 mL, Intravenous, Q12H  sodium chloride, , ,           LAB/DIAGNOSTICS:    Lab Results (last 24 hours)       Procedure Component Value Units Date/Time    CBC Auto Differential [[id number]]  (Abnormal) Collected: 08/16/23 0408    Specimen: Blood Updated: 08/16/23 0446     WBC 11.03 10*3/mm3      RBC 2.51 10*6/mm3      Hemoglobin 7.4 g/dL      Hematocrit 24.5 %      MCV 97.6 fL      MCH 29.5 pg      MCHC 30.2 g/dL      RDW " 22.4 %      RDW-SD 73.3 fl      MPV 11.0 fL      Platelets 237 10*3/mm3      Neutrophil % 65.9 %      Lymphocyte % 20.9 %      Monocyte % 10.4 %      Eosinophil % 1.6 %      Basophil % 0.5 %      Immature Grans % 0.7 %      Neutrophils, Absolute 7.26 10*3/mm3      Lymphocytes, Absolute 2.31 10*3/mm3      Monocytes, Absolute 1.15 10*3/mm3      Eosinophils, Absolute 0.18 10*3/mm3      Basophils, Absolute 0.05 10*3/mm3      Immature Grans, Absolute 0.08 10*3/mm3      nRBC 0.4 /100 WBC     High Sensitivity Troponin T 2Hr [[id number]]  (Abnormal) Collected: 08/15/23 1819    Specimen: Blood Updated: 08/15/23 1838     HS Troponin T 25 ng/L      Troponin T Delta -1 ng/L     Narrative:      High Sensitive Troponin T Reference Range:  <10.0 ng/L- Negative Female for AMI  <15.0 ng/L- Negative Male for AMI  >=10 - Abnormal Female indicating possible myocardial injury.  >=15 - Abnormal Male indicating possible myocardial injury.   Clinicians would have to utilize clinical acumen, EKG, Troponin, and serial changes to determine if it is an Acute Myocardial Infarction or myocardial injury due to an underlying chronic condition.         Urinalysis, Microscopic Only - Urine, Clean Catch [[id number]]  (Abnormal) Collected: 08/15/23 1710    Specimen: Urine, Clean Catch Updated: 08/15/23 1736     RBC, UA None Seen /HPF      WBC, UA 13-20 /HPF      Bacteria, UA 4+ /HPF      Squamous Epithelial Cells, UA 0-2 /HPF      Hyaline Casts, UA None Seen /LPF      Methodology Manual Light Microscopy    Urine Culture - Urine, Urine, Clean Catch [[id number]] Collected: 08/15/23 1710    Specimen: Urine, Clean Catch Updated: 08/15/23 1736    Urinalysis With Culture If Indicated - Urine, Clean Catch [[id number]]  (Abnormal) Collected: 08/15/23 1710    Specimen: Urine, Clean Catch Updated: 08/15/23 1724     Color, UA Dark Yellow     Comment: Any Substance that causes an abnormal urine color can alter the accuracy of the chemical reactions.  Corrected  result. Previous result was Green on 8/15/2023 at 1722 EDT.        Appearance, UA Cloudy     pH, UA 5.5     Specific Gravity, UA 1.015     Glucose, UA Negative     Ketones, UA Negative     Bilirubin, UA Negative     Blood, UA Negative     Protein, UA Negative     Leuk Esterase, UA Small (1+)     Nitrite, UA Negative     Urobilinogen, UA 0.2 E.U./dL    Narrative:      In absence of clinical symptoms, the presence of pyuria, bacteria, and/or nitrites on the urinalysis result does not correlate with infection.    Protime-INR [[id number]]  (Abnormal) Collected: 08/15/23 1557    Specimen: Blood Updated: 08/15/23 1718     Protime 31.3 Seconds      INR 3.09    Narrative:      Therapeutic Ranges for INR: 2.0-3.0 (PT 20-30)                              2.5-3.5 (PT 25-34)    CBC & Differential [[id number]]  (Abnormal) Collected: 08/15/23 1557    Specimen: Blood Updated: 08/15/23 1659    Narrative:      The following orders were created for panel order CBC & Differential.  Procedure                               Abnormality         Status                     ---------                               -----------         ------                     CBC Auto Differential[[id number]]        Abnormal            Final result               Scan Slide[[id number]]                                       Final result                 Please view results for these tests on the individual orders.    Scan Slide [[id number]] Collected: 08/15/23 1557    Specimen: Blood Updated: 08/15/23 1659     RBC Morphology Normal     WBC Morphology Normal     Platelet Estimate Increased    Blood Gas, Arterial - [[id number]]  (Abnormal) Collected: 08/15/23 1650    Specimen: Arterial Blood Updated: 08/15/23 1655     Site Right Radial     Silver's Test Positive     pH, Arterial 7.490 pH units      Comment: 83 Value above reference range        pCO2, Arterial 42.3 mm Hg      pO2, Arterial 96.6 mm Hg      HCO3, Arterial 32.2 mmol/L      Comment: 83 Value above reference  range        Base Excess, Arterial 8.1 mmol/L      Comment: 83 Value above reference range        O2 Saturation, Arterial 99.1 %      Comment: 83 Value above reference range        Hemoglobin, Blood Gas 5.4 g/dL      Temperature 37.0 C      Barometric Pressure for Blood Gas 738 mmHg      Modality Nasal Cannula     Flow Rate 2.0 lpm      Collected by [id number]     Comment: Meter: [id number]     :  [id number]        pCO2, Temperature Corrected 42.3 mm Hg      pH, Temp Corrected 7.490 pH Units      pO2, Temperature Corrected 96.6 mm Hg     CBC Auto Differential [[id number]]  (Abnormal) Collected: 08/15/23 1557    Specimen: Blood Updated: 08/15/23 1634     WBC 13.00 10*3/mm3      RBC 1.91 10*6/mm3      Hemoglobin 5.9 g/dL      Hematocrit 20.4 %      .8 fL      MCH 30.9 pg      MCHC 28.9 g/dL      RDW 21.2 %      RDW-SD 80.0 fl      MPV 11.1 fL      Platelets 337 10*3/mm3      Neutrophil % 67.4 %      Lymphocyte % 18.8 %      Monocyte % 11.9 %      Eosinophil % 0.8 %      Basophil % 0.3 %      Immature Grans % 0.8 %      Neutrophils, Absolute 8.76 10*3/mm3      Lymphocytes, Absolute 2.45 10*3/mm3      Monocytes, Absolute 1.55 10*3/mm3      Eosinophils, Absolute 0.10 10*3/mm3      Basophils, Absolute 0.04 10*3/mm3      Immature Grans, Absolute 0.10 10*3/mm3      nRBC 0.5 /100 WBC     Procalcitonin [[id number]]  (Normal) Collected: 08/15/23 1557    Specimen: Blood Updated: 08/15/23 1632     Procalcitonin 0.16 ng/mL     Narrative:      As a Marker for Sepsis (Non-Neonates):    1. <0.5 ng/mL represents a low risk of severe sepsis and/or septic shock.  2. >2 ng/mL represents a high risk of severe sepsis and/or septic shock.    As a Marker for Lower Respiratory Tract Infections that require antibiotic therapy:    PCT on Admission    Antibiotic Therapy       6-12 Hrs later    >0.5                Strongly Recommended  >0.25 - <0.5        Recommended   0.1 - 0.25          Discouraged              Remeasure/reassess  "PCT  <0.1                Strongly Discouraged     Remeasure/reassess PCT    As 28 day mortality risk marker: \"Change in Procalcitonin Result\" (>80% or <=80%) if Day 0 (or Day 1) and Day 4 values are available. Refer to http://www.[facility]-[email or web address]-[email or web address]    Change in PCT <=80%  A decrease of PCT levels below or equal to 80% defines a positive change in PCT test result representing a higher risk for 28-day all-cause mortality of patients diagnosed with severe sepsis for septic shock.    Change in PCT >80%  A decrease of PCT levels of more than 80% defines a negative change in PCT result representing a lower risk for 28-day all-cause mortality of patients diagnosed with severe sepsis or septic shock.       High Sensitivity Troponin T [[id number]]  (Abnormal) Collected: 08/15/23 1557    Specimen: Blood Updated: 08/15/23 1631     HS Troponin T 26 ng/L     Narrative:      High Sensitive Troponin T Reference Range:  <10.0 ng/L- Negative Female for AMI  <15.0 ng/L- Negative Male for AMI  >=10 - Abnormal Female indicating possible myocardial injury.  >=15 - Abnormal Male indicating possible myocardial injury.   Clinicians would have to utilize clinical acumen, EKG, Troponin, and serial changes to determine if it is an Acute Myocardial Infarction or myocardial injury due to an underlying chronic condition.         Comprehensive Metabolic Panel [[id number]]  (Abnormal) Collected: 08/15/23 1557    Specimen: Blood Updated: 08/15/23 1629     Glucose 119 mg/dL      BUN 58 mg/dL      Creatinine 2.14 mg/dL      Sodium 140 mmol/L      Potassium 3.3 mmol/L      Chloride 97 mmol/L      CO2 30.6 mmol/L      Calcium 9.0 mg/dL      Total Protein 6.0 g/dL      Albumin 4.0 g/dL      ALT (SGPT) 7 U/L      AST (SGOT) 13 U/L      Alkaline Phosphatase 68 U/L      Total Bilirubin 0.3 mg/dL      Globulin 2.0 gm/dL      A/G Ratio 2.0 g/dL      BUN/Creatinine Ratio 27.1     Anion Gap 12.4 mmol/L      eGFR 21.3 mL/min/1.73     Narrative:      GFR " Normal >60  Chronic Kidney Disease <60  Kidney Failure <15    The GFR formula is only valid for adults with stable renal function between ages 18 and 70.          ECG 12 Lead Dyspnea   Final Result   HEART RATE= 66  bpm   RR Interval= 904  ms   WI Interval= 53  ms   P Horizontal Axis=   deg   P Front Axis= 0  deg   QRSD Interval= 104  ms   QT Interval= 455  ms   QRS Axis= 6  deg   T Wave Axis= 183  deg   - ABNORMAL ECG -   Sinus rhythm   Short WI interval   Abnormal T, consider ischemia, diffuse leads   NO PRIOR TRACING AVAILABLE FOR COMPARISON   Electronically Signed By: Mc Humphries ([facility]) 15-Aug-2023 16:35:38   Date and Time of Study: 2023-08-15 16:06:06        Results for orders placed during the hospital encounter of 02/28/18    Adult Transthoracic Echo Complete W/ Cont if Necessary Per Protocol    Interpretation Summary  ú Left atrial cavity size is mildly dilated.  ú There is calcification of the aortic valve.  ú Mild-to-moderate mitral valve regurgitation is present  ú Mild tricuspid valve regurgitation is present.  ú Left Ventricle: Calculated EF = 62.9%.  ú There is no evidence of pericardial effusion    XR Chest 2 View    Result Date: 8/15/2023  Cardiomegaly. Mild prominence of the pulmonary vascularity centrally. Similar findings to prior study of 8/1/2023.   This report was finalized on 8/15/2023 4:28 PM by Dr. Po Ramsey MD.         ASSESSMENT/PLAN:  Please note portions of this assessment/plan may have been copied and pasted, but I have personally seen this patient and reviewed each line of this assessment and plan for accuracy and made updates to reflect my necessary changes on 8/16/2023    Acute on chronic anemia  -Hemoglobin has increased to 7.4 status post 2 unit packed red blood cell transfusion.   -GI consulted for assistance.  Initially had plan to have patient have pill endoscopy, only available as outpatient, therefore patient will need to be stabilized here prior to discharge to pursue pill  "endoscopy.   -Anemia is currently macrocytic.   -BUN elevated on admission, therefore concern for active bleeding.   -Patient has had previous EGD and colonoscopy.  Most recent push endoscopy revealed no acute bleed.   -Patient remains on clear liquid diet for now.   -Continue to monitor H&H closely.       Elevated troponin  -Mildly elevated, likely secondary to anemia, has down trended.   -No reports of chest pain and no acute ischemic changes on EKG.     Osteoporosis-resume alendronate on discharge     Hypertension-continue amlodipine and metoprolol     Iron deficiency anemia-continue home iron supplement, treating anemia otherwise as above     Peripheral neuropathy-continue home gabapentin     Depression and loss of appetite-continue mirtazapine     GERD-continue Protonix.  Increased to twice daily dosing.      Hyperlipidemia-continue pravastatin     DVT PPX: SCDs      PLAN FOR DISPOSITION: TBD    Alberto Newman DO  Hospitalist, [facility]  08/16/23  07:24 EDT    At [facility], we believe that sharing information builds trust and better relationships. You are receiving this note because you recently visited [facility]. It is possible you will see health information before a provider has talked with you about it. This kind of information can be easy to misunderstand. To help you fully understand what it means for your health, we urge you to discuss this note with your provider.    \"Dictated utilizing Dragon dictation\"      Electronically signed by Alberto Newman DO at 08/16/23 0851       Consult Notes (last 24 hours)  Notes from 08/15/23 1417 through 08/16/23 1417   No notes of this type exist for this encounter.       "

## 2023-08-16 NOTE — PLAN OF CARE
Goal Outcome Evaluation:  Plan of Care Reviewed With: patient        Progress: improving  Outcome Evaluation: VS stable, requring NC 2 LPM to keep sats above 90%, RA sats 89%. No c/o SOA or pain. Pt received 2 untis of PRBC's overnight for Hgb 5.9, this mornings Hgb 7.4. GI consult this morning.

## 2023-08-16 NOTE — CASE MANAGEMENT/SOCIAL WORK
Continued Stay Note  KALA Lu     Patient Name: Aria Fernando  MRN: [id number]  Today's Date: 8/16/2023    Admit Date: 8/15/2023    Plan: plan return to Assisted Living/current [facility]   Discharge Plan       Row Name 08/16/23 1404       Plan    Plan plan return to Assisted Living/current [facility]    Patient/Family in Agreement with Plan yes    Plan Comments Spoke with patient at bedside, she is sitting up in recliner. Face sheet verified - listed address is her daughters address and where she receives her mail. She lives at [facility]. She is independent of ADLs but does not drive. Family provides transportation as needed. She has rollator, wc and canes that she can use as needed. She is current with [facility] and has been to [facility] for rehab in the past.She has a living will on file. She sees Dr Jaymie Aj as PCp and uses [facility] [place]. There are no issues obtaining medications. She states  has instructed her to weigh daily, but she does not have scales. Scale provided to patient to take home at [place]. [facility] for [facility] to notify of patient admission. Patient will return to assisted living with [facility] at [place]. CM # placed on white board, will continue to follow.                   Discharge Codes    No documentation.                       Robert Henriquez RN

## 2023-08-16 NOTE — PROGRESS NOTES
Reviewed patient's chart again this morning due to concern patient continues to bleed despite no obvious cause or overt blood loss. INR was elevated yesterday in ER and review of previous notes shows she was taken off Xarelto at a previous discharge. I therefore decided to double check with patient to ensure she was not still taking a blood thinner. Unfortunately she immediately says she is still on a blood thinner. She reports her daughter takes care of all her medications, therefore she doesn't know her meds, but she knows for sure she's been on a blood thinner and was taking right up to admission. This would explain why we can get her Hgb stable after transfusions, but when she goes home she drops time after time.     I will contact daughter and instruct her to hold the Xarelto and or any other blood thinner product indefinitely.

## 2023-08-16 NOTE — CONSULTS
Referring Provider: Dr. Newman   Reason for Consultation: Symptomatic Anemia     Patient Care Team:  Jimena Aj MD as PCP - General (Family Medicine)    Chief complaint Worsening fatigue     Subjective .     History of present illness:      89 yo F with PMH of AVMs of colon s/p APC, CAD, HFpEF, PAD, A-Fib, JHOANA on PO iron who initially presented on 8/15 with 4 to 5 day duration of worsening fatigue. Admitted for symptomatic anemia with admission Hgb of 5.9, baseline Hgb ~8. GI consulted for further evaluation.     Patient denies overt bleeding but reports her bowel movements are always black due to taking PO iron. Admitted earlier this month for similar presentation. Push enteroscopy on 8/3 was performed that showed no evidence of bleeding. Was discharged after receiving IV iron with improvement of Hgb. Previously had colonoscopy in 6/2023 that showed two small AVMs in cecum and ascending colon s/p APC and multiple sub-cm polyps s/p removal.     Patient's case discussed with Dr. Newman prior to admission. We were in agreement that she needs a capsule endoscopy for further evaluation which our facility currently does not have the capability. Initially, the plan was to have the patient transferred to [facility] for further evaluation given the ability to perform capsule endoscopy. Was notified that capsule endoscopy is only performed outpatient at [facility]. Also notified by Dr. Newman that the patient had not discontinued her Xarelto as instructed following her most recent hospitalization earlier this month.     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     On anticoagulant therapy     Osteoarthritis     Polycythemia     PVD (peripheral vascular disease)    ,   Past Surgical History:   Procedure Laterality Date    APPENDECTOMY      CAROTID ENDARTERECTOMY Left     COLONOSCOPY N/A 03/03/2018    Procedure:  Colonoscopy to Terminal ileum with APC treatment for 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 and cold biopsy;  Surgeon: Terrie Carroll MD;  Location:  [facility];  Service:     COLONOSCOPY W/ POLYPECTOMY N/A 6/21/2023    Procedure: COLONOSCOPY WITH POLYPECTOMY AND APC;  Surgeon: Emiliano Rodriguez MD;  Location:  [facility];  Service: Gastroenterology;  Laterality: N/A;  transverse polyp-forcep  cecal time 1659  APC  DESCENDING POLYP  SIGMOID POLYP    ENDOSCOPY N/A 03/03/2018    Procedure: EGD with biopsy;  Surgeon: Terrie Carroll MD;  Location:  [facility];  Service:     ENDOSCOPY N/A 6/19/2023    Procedure: ESOPHAGOGASTRODUODENOSCOPY;  Surgeon: Emiliano Rodriguez MD;  Location: [facility];  Service: Gastroenterology;  Laterality: N/A;  Reflux, Gastritis    ENDOSCOPY N/A 8/3/2023    Procedure: ENTEROSCOPY SMALL BOWEL;  Surgeon: Vadim Carr MD;  Location: [facility];  Service: Gastroenterology;  Laterality: N/A;  tattoo to small bowel 60cm    KNEE ARTHROSCOPY Right    , History reviewed. No pertinent family history.,   Social History     Socioeconomic History    Marital status: Single   Tobacco Use    Smoking status: Never    Smokeless tobacco: Never   Vaping Use    Vaping Use: Never used   Substance and Sexual Activity    Alcohol use: No    Drug use: No    Sexual activity: Never     E-cigarette/Vaping    E-cigarette/Vaping Use Never User     Passive Exposure No     Counseling Given Yes      E-cigarette/Vaping Substances    Nicotine No     THC No     CBD No     Flavoring No      E-cigarette/Vaping Devices    Disposable No     Pre-filled or Refillable Cartridge No     Refillable Tank No     Pre-filled Pod No          ,    Medications Prior to Admission   Medication Sig Dispense Refill Last Dose    amiodarone (PACERONE) 200 MG tablet Take 1 tablet by mouth Daily.   8/15/2023    amLODIPine (NORVASC) 5 MG tablet Take 1 tablet by mouth Daily.   8/15/2023    bisacodyl (DULCOLAX) 5 MG EC tablet Take 1 tablet by mouth Daily As Needed for Constipation.   8/15/2023    Calcium Carb-Cholecalciferol (OYSTER SHELL CALCIUM/VITAMIN D) 250-125 MG-UNIT tablet tablet Take 2 tablets by mouth 2 (Two) Times a Day.   8/15/2023    cholecalciferol (VITAMIN D3) 400 units tablet Take 1 tablet by mouth 2 (Two) Times a Day.   8/15/2023    docusate sodium (COLACE) 100 MG capsule Take 1 capsule by mouth 2 (Two) Times a Day As Needed.   8/15/2023    ferrous sulfate 325 (65 FE) MG tablet Take 1 tablet by mouth Daily With Breakfast.   8/15/2023    gabapentin (NEURONTIN) 100 MG capsule Take 1 capsule by mouth 2 (Two) Times a Day.   8/15/2023    Magnesium Oxide -Mg Supplement 400 (240 Mg) MG tablet Take 1 tablet by mouth Daily.   8/15/2023    metoprolol tartrate (LOPRESSOR) 25 MG tablet Take 0.5 tablets by mouth Every 12 (Twelve) Hours. 15 tablet 1 8/15/2023    mirtazapine (REMERON) 15 MG tablet Take 1 tablet by mouth Every Night.   8/14/2023    pantoprazole (Protonix) 40 MG EC tablet Take 1 tablet by mouth Daily. 30 tablet 1 8/15/2023    pravastatin (PRAVACHOL) 80 MG tablet Take 1 tablet by mouth Daily.   8/15/2023    acetaminophen (TYLENOL) 325 MG tablet Take 1.5 tablets by mouth Every 4 (Four) Hours As Needed for Mild Pain.   Unknown    alendronate (FOSAMAX) 70 MG tablet Take 1 tablet by mouth Every 7 (Seven) Days.   Unknown   , Scheduled Meds:  amiodarone, 200 mg, Oral, Daily  amLODIPine, 5 mg, Oral, Daily  cholecalciferol, 400 Units, Oral, BID  ferrous sulfate, 324 mg, Oral, Daily With Breakfast  gabapentin, 100 mg, Oral, BID  magnesium oxide, 400 mg, Oral, Daily  metoprolol tartrate, 12.5 mg, Oral, Q12H  mirtazapine, 15 mg, Oral, Nightly  pantoprazole, 40 mg,  Oral, BID AC  pravastatin, 80 mg, Oral, Daily  sodium chloride, 10 mL, Intravenous, Q12H   , Continuous Infusions:   , PRN Meds:    acetaminophen    bisacodyl    Calcium Replacement - Follow Nurse / BPA Driven Protocol    docusate sodium    Magnesium Low Dose Replacement - Follow Nurse / BPA Driven Protocol    Phosphorus Replacement - Follow Nurse / BPA Driven Protocol    Potassium Replacement - Follow Nurse / BPA Driven Protocol    sodium chloride    sodium chloride, and Allergies:  Clinoril [sulindac], Codeine, Coumadin [warfarin sodium], Ibuprofen-oxycodone [oxycodone-ibuprofen], Keflex [cephalexin], Mydriacyl [tropicamide], Don-synephrine [phenylephrine], Penicillins, Phenylephrine hcl (pressors), Sulfa antibiotics, and Zantac [ranitidine hcl]    Objective     Vital Signs   Temp:  [97.5 øF (36.4 øC)-98.8 øF (37.1 øC)] 98 øF (36.7 øC)  Heart Rate:  [65-76] 65  Resp:  [16-20] 17  BP: ()/(52-72) 98/54    Physical Exam:   General Appearance alert, appears stated age, and cooperative  Head normocephalic, without obvious abnormality and atraumatic  Lungs clear to auscultation, respirations regular, respirations even, and respirations unlabored  Heart regular rhythm & normal rate, normal S1, S2, and no click  Abdomen normal bowel sounds, no masses, soft non-tender, no guarding, and no rebound tenderness    Results Review:   I reviewed the patient's new clinical results.  I reviewed the patient's new imaging results and agree with the interpretation.      Assessment & Plan       Anemia      89 yo F with PMH of AVMs of colon s/p APC, CAD, HFpEF, PAD, A-Fib, JHOANA on PO iron who initially presented on 8/15 with 4 to 5 day duration of worsening fatigue. Admitted for symptomatic anemia with admission Hgb of 5.9, baseline Hgb ~8. GI consulted for further evaluation.     # Symptomatic Anemia   # Iron Deficiency Anemia   - Endorses black colored stools which she attributes to PO iron   - Transfused 2 u pRBC at admission  with appropriate response. Hgb 5.9 --> 7.8    - Noted to have remained on Xarelto following recent discharge although instructed not to   - Push enteroscopy on 8/3: normal throughout with no e/o bleeding   - Colonoscopy in 6/2023: two small AVMs in cecum and ascending colon s/p APC and multiple sub-cm polyps s/p removal.   Plan:   - Obtain tagged RBC scan to further evaluate for a potential source of GI bleeding to determine if repeat endoscopic evaluation is needed while inpatient   - Plan for capsule endoscopy outpatient at [facility]   - Continue PO iron   - Continue Protonix 40 mg BID   - Agree with holding Xarelto indefinitely     I discussed the patients findings and my recommendations with patient    Vadim Carr MD  08/16/23  14:43 EDT

## 2023-08-17 ENCOUNTER — READMISSION MANAGEMENT (OUTPATIENT)
Dept: CALL CENTER | Facility: HOSPITAL | Age: 88
End: 2023-08-17
Payer: MEDICARE

## 2023-08-17 VITALS
RESPIRATION RATE: 20 BRPM | BODY MASS INDEX: 34.77 KG/M2 | TEMPERATURE: 98.1 F | OXYGEN SATURATION: 93 % | HEART RATE: 73 BPM | WEIGHT: 188.93 LBS | HEIGHT: 62 IN | SYSTOLIC BLOOD PRESSURE: 102 MMHG | DIASTOLIC BLOOD PRESSURE: 64 MMHG

## 2023-08-17 LAB
ANION GAP SERPL CALCULATED.3IONS-SCNC: 7.6 MMOL/L (ref 5–15)
BACTERIA SPEC AEROBE CULT: ABNORMAL
BASOPHILS # BLD AUTO: 0.02 10*3/MM3 (ref 0–0.2)
BASOPHILS NFR BLD AUTO: 0.2 % (ref 0–1.5)
BH BB BLOOD EXPIRATION DATE: NORMAL
BH BB BLOOD EXPIRATION DATE: NORMAL
BH BB BLOOD TYPE BARCODE: 9500
BH BB BLOOD TYPE BARCODE: 9500
BH BB DISPENSE STATUS: NORMAL
BH BB DISPENSE STATUS: NORMAL
BH BB PRODUCT CODE: NORMAL
BH BB PRODUCT CODE: NORMAL
BH BB UNIT NUMBER: NORMAL
BH BB UNIT NUMBER: NORMAL
BUN SERPL-MCNC: 52 MG/DL (ref 8–23)
BUN/CREAT SERPL: 31.9 (ref 7–25)
CALCIUM SPEC-SCNC: 8.1 MG/DL (ref 8.2–9.6)
CHLORIDE SERPL-SCNC: 104 MMOL/L (ref 98–107)
CO2 SERPL-SCNC: 29.4 MMOL/L (ref 22–29)
CREAT SERPL-MCNC: 1.63 MG/DL (ref 0.57–1)
CROSSMATCH INTERPRETATION: NORMAL
CROSSMATCH INTERPRETATION: NORMAL
DEPRECATED RDW RBC AUTO: 66.5 FL (ref 37–54)
EGFRCR SERPLBLD CKD-EPI 2021: 29.5 ML/MIN/1.73
EOSINOPHIL # BLD AUTO: 0.25 10*3/MM3 (ref 0–0.4)
EOSINOPHIL NFR BLD AUTO: 2.5 % (ref 0.3–6.2)
ERYTHROCYTE [DISTWIDTH] IN BLOOD BY AUTOMATED COUNT: 21.7 % (ref 12.3–15.4)
GLUCOSE SERPL-MCNC: 77 MG/DL (ref 65–99)
HCT VFR BLD AUTO: 27.9 % (ref 34–46.6)
HGB BLD-MCNC: 8.9 G/DL (ref 12–15.9)
IMM GRANULOCYTES # BLD AUTO: 0.03 10*3/MM3 (ref 0–0.05)
IMM GRANULOCYTES NFR BLD AUTO: 0.3 % (ref 0–0.5)
LYMPHOCYTES # BLD AUTO: 1.88 10*3/MM3 (ref 0.7–3.1)
LYMPHOCYTES NFR BLD AUTO: 18.7 % (ref 19.6–45.3)
MCH RBC QN AUTO: 30.2 PG (ref 26.6–33)
MCHC RBC AUTO-ENTMCNC: 31.9 G/DL (ref 31.5–35.7)
MCV RBC AUTO: 94.6 FL (ref 79–97)
MONOCYTES # BLD AUTO: 1.08 10*3/MM3 (ref 0.1–0.9)
MONOCYTES NFR BLD AUTO: 10.7 % (ref 5–12)
NEUTROPHILS NFR BLD AUTO: 6.79 10*3/MM3 (ref 1.7–7)
NEUTROPHILS NFR BLD AUTO: 67.6 % (ref 42.7–76)
PLATELET # BLD AUTO: 210 10*3/MM3 (ref 140–450)
PMV BLD AUTO: 10.1 FL (ref 6–12)
POTASSIUM SERPL-SCNC: 3.7 MMOL/L (ref 3.5–5.2)
RBC # BLD AUTO: 2.95 10*6/MM3 (ref 3.77–5.28)
SODIUM SERPL-SCNC: 141 MMOL/L (ref 136–145)
UNIT  ABO: NORMAL
UNIT  ABO: NORMAL
UNIT  RH: NORMAL
UNIT  RH: NORMAL
WBC NRBC COR # BLD: 10.05 10*3/MM3 (ref 3.4–10.8)

## 2023-08-17 PROCEDURE — 85025 COMPLETE CBC W/AUTO DIFF WBC: CPT | Performed by: INTERNAL MEDICINE

## 2023-08-17 PROCEDURE — G0378 HOSPITAL OBSERVATION PER HR: HCPCS

## 2023-08-17 PROCEDURE — 80048 BASIC METABOLIC PNL TOTAL CA: CPT | Performed by: INTERNAL MEDICINE

## 2023-08-17 PROCEDURE — 99239 HOSP IP/OBS DSCHRG MGMT >30: CPT | Performed by: INTERNAL MEDICINE

## 2023-08-17 RX ORDER — PANTOPRAZOLE SODIUM 40 MG/1
40 TABLET, DELAYED RELEASE ORAL
Qty: 30 TABLET | Refills: 0 | Status: SHIPPED | OUTPATIENT
Start: 2023-08-17

## 2023-08-17 RX ADMIN — Medication 400 UNITS: at 09:49

## 2023-08-17 RX ADMIN — PRAVASTATIN SODIUM 80 MG: 20 TABLET ORAL at 09:50

## 2023-08-17 RX ADMIN — METOPROLOL TARTRATE 12.5 MG: 25 TABLET, FILM COATED ORAL at 09:48

## 2023-08-17 RX ADMIN — FERROUS SULFATE TAB EC 324 MG (65 MG FE EQUIVALENT) 324 MG: 324 (65 FE) TABLET DELAYED RESPONSE at 09:49

## 2023-08-17 RX ADMIN — Medication 400 MG: at 09:49

## 2023-08-17 RX ADMIN — GABAPENTIN 100 MG: 100 CAPSULE ORAL at 09:48

## 2023-08-17 RX ADMIN — AMIODARONE HYDROCHLORIDE 200 MG: 200 TABLET ORAL at 09:49

## 2023-08-17 RX ADMIN — AMLODIPINE BESYLATE 5 MG: 5 TABLET ORAL at 09:49

## 2023-08-17 RX ADMIN — PANTOPRAZOLE SODIUM 40 MG: 40 TABLET, DELAYED RELEASE ORAL at 06:30

## 2023-08-17 RX ADMIN — Medication 10 ML: at 09:50

## 2023-08-17 NOTE — PLAN OF CARE
Goal Outcome Evaluation:  Plan of Care Reviewed With: patient        Progress: improving  Outcome Evaluation: Pt VSS, am labs show WBC trending down, HBG stable at 8.9, pt completed 2 units of PRBC overnight with hgb improvement from 7.8 to 9.2; pt has had 4 units blood since admission. Pt denies pain overnight, reports controlled with current regimen. Pt denies n/v/d, reports tolerating diet well. Pt reports desire to d/c soon. Pt resting at this time.

## 2023-08-17 NOTE — DISCHARGE INSTR - APPOINTMENTS
Patient needs to call and make a hospital follow up with her primary care  provider Dr. Aj [phone] within 1 week of discharge.

## 2023-08-17 NOTE — DISCHARGE SUMMARY
Aria Fernando  9/27/1930  [id number]    Hospitalists Discharge Summary    Date of Admission: 8/15/2023  Date of Discharge:  8/17/2023    History of Present Illness from [facility] on admit: Patient is a 89-year-old female very well-known to me in our facility from previous admissions for acute on chronic anemia.  Her most recent admission was the first week of August 2023, in which she was underwent push endoscopy, no active bleeding found, she was therefore given IV iron transfusions and hemoglobin stabilized.  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  Therefore prior to admission this time I reached out to Dr. Vadim Carr in passing and was able to briefly discuss case with him, we mutually agreed patient should undergo pill endoscopy.  Unfortunately this is only done on an outpatient basis therefore admission was necessitated by need for stabilization of her hemoglobin of 5.9 today.  Patient reports today that she has had 4 to 5 days of increasing weakness and fatigue, similar to previous episodes where she has had low blood counts.  She reports she is short of breath with exertion but denies any chest pain.  She has not seen any gross blood in her bowel movements, but she does have chronically black stools and is still taking iron supplements.  Patient spoke to her PCP Dr. Aj who recommended she come back to the ED for evaluation.      2 units of packed red blood cells have been ordered for patient from ED.     Primary Discharge diagnoses: Acute on chronic anemia secondary to GI bleed-GI bleed location has been exceedingly difficult to locate despite multiple EGDs and colonoscopies in the past, tagged red blood cell scan did not reveal GI bleed, will need pill endoscopy soon as possible to attempt to locate bleed.  Patient is no longer a candidate for blood thinner or antiplatelet therapy.     Secondary Discharge Diagnoses: Elevated troponin-downtrended, no chest pain.  Osteoporosis-no acute issues. Hypertension-stable. JHOANA-on oral iron. Peripheral neuropathy-stable. Depression-stable on mirtazapine. GERD-increased protonix to BID. HLD-stable on statin.     Hospital Course Summary: Patient was admitted for recurrent GI bleeding and resulting acute on chronic anemia. Hgb 5.9 on arrival, in total given 4 units PRBC to bring Hgb to 8.9 at time of discharge. In ED recommendation from GI was to attempt transfer for pill endsocopy, but this is only available outpatient, therefore we admitted to stabilize her. GI followed here and performed tagged RBC scan, where no GI bleed was identified. Patient's symptoms are fully resolved, she briefly required oxygen due to the anemia, but has been liberated from oxygen. Unfortunately it was also learned via elevated INR on admission that patient's daughter administers her medications and she mistakenly thought she was to resume aspirin and Xarelto each time patient has been discharged.  I therefore contacted patient's daughter directly and discussed med list does not indicate she is on these medications, daughter informed me she will no longer give Xarelto or aspirin.  I informed patient and daughter that patient is no longer a candidate for either of these medications.  They are both willing to accept risk of recurrent DVT. On 8/17/2023 patient's condition had improved. They were deemed stable for discharge. They were advised to take all medications as prescribed, follow up with PCP within 1 week. If there are any issues patient should contact PCP and/or return to the ED for follow up. Patient was agreeable to the plan and subsequently discharged at this time.     PRIOR to discharge discussed with Dr. Vadim Carr who will assist with discharge follow up with [facility] for pill endoscopy, referral has been placed.     PCP  Patient Care Team:  Jimena Aj MD as PCP - General (Family Medicine)    Consults:   Consults       Date and Time Order  Name Status Description    8/15/2023  6:08 PM Inpatient Gastroenterology Consult Completed             Operations and Procedures Performed:       CT Abdomen Pelvis Without Contrast    Result Date: 8/1/2023  Narrative: CT ABDOMEN AND PELVIS WITHOUT CONTRAST 8/1/2023  HISTORY: 89-year-old female with generalized weakness and shortness of air. Low hemoglobin and red blood cell count.  TECHNIQUE: Noncontrast CT of the abdomen and pelvis was performed. Sagittal and coronal reformats performed. No comparisons. Radiation dose reduction techniques included automated exposure control or exposure modulation based on body size. Radiation audit for CT and nuclear cardiology exams in the last 12 months: 0.  ABDOMEN FINDINGS: Included lung bases show areas of atelectasis. Old healed granulomatous disease. The heart is enlarged. There is no effusion. Normal caliber aorta with advanced atherosclerotic change. The spleen and adrenal glands are negative and the pancreas is negative. Uncomplicated cholelithiasis. Liver and spleen both show granulomatous changes. The kidneys are nonobstructed. There is atrophy and cortical scarring bilaterally. Probable faint renal vascular calcifications on the left. No distinct ureteral stone. In the lateral midpole left kidney there is a mildly hyperdense exophytic lesion probably representing a hemorrhagic cyst and measuring 10 mm. This could be confirmed with nonemergent renal ultrasound. There is no threshold adenopathy.  PELVIS FINDINGS: Leiomyomatous change of the uterus. No drainable fluid collection. Negative bladder. There is no bowel obstruction or focal inflammatory change of the bowel. Negative terminal ileum. Appendix surgically absent by history. Inguinal canals are negative. There are advanced degenerative changes in the lower lumbar spine.  negative.      Impression: 1. No clearly acute process in the abdomen or pelvis. No bowel obstruction drainable fluid collection or focal  area of inflammatory change. Surgically absent appendix. 2. Uncomplicated cholelithiasis. 3. Probable proteinaceous/hemorrhagic cyst in the posterior lateral left kidney measuring 10 mm. This could be confirmed with renal ultrasound. Additional benign simple cyst arising from the lower pole left kidney requiring no additional follow-up. 4. Leiomyomatous change of uterus.   This report was finalized on 8/1/2023 3:39 PM by Dr. Tristan Cooley MD.      XR Chest 2 View    Result Date: 8/15/2023  Narrative: CHEST, 2 VIEWS  HISTORY: Generalized weakness  Comparison: 8/1/2023  FINDINGS: Lung volumes are diminished. The heart size is enlarged. Mediastinal structures are midline.  Mild fullness of the pulmonary vascularity centrally. Minimally increased interstitial markings bilaterally. No pleural fluid. No pneumothorax.      Impression: Cardiomegaly. Mild prominence of the pulmonary vascularity centrally. Similar findings to prior study of 8/1/2023.   This report was finalized on 8/15/2023 4:28 PM by Dr. Po Ramsey MD.      NM GI Blood Loss    Result Date: 8/17/2023  Narrative: RADIONUCLIDE TAGGED RED BLOOD CELL GI BLEEDING STUDY, 8/16/2023  HISTORY: 89-year-old female hospital inpatient with repeated hospital admissions for acute on chronic GI blood loss anemia requiring iron infusion. 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 She has ongoing dark black stools but no currently reported bright red bleeding per rectum.  DOSE: 27.9 mCi Ultratag labeled autologous red blood cells.  COMPARISON: CT abdomen/pelvis, 8/1/2023.  FINDINGS: Physiologic intravascular red blood cell activity on early dynamic images and delayed images within the abdomen and pelvis. No visible abnormal focal accumulation of red blood cell activity to suggest active GI bleeding occurring during the duration of the examination.      Impression: Negative tagged red blood cell GI bleeding study. No evidence of active GI bleeding at the time of imaging.   This  report was finalized on 8/17/2023 1:30 AM by Dr. Jose Alba MD.      XR Chest 1 View    Result Date: 8/1/2023  Narrative: XR CHEST 1 VW-: 8/1/2023 3:29 PM  INDICATION: Short of air 2 days. HISTORY of congestive heart failure hypertension and coronary artery disease. Former smoker.  COMPARISON:  6/18/2023.  FINDINGS: Single Portable AP view(s) of the chest. Cardiac silhouette is enlarged. The aortic knob is prominent and atherosclerotic, unchanged. Interstitial prominence and cephalization most characteristic of mild volume overload with probable mild perihilar edema. There is no dense consolidation or effusion. No pneumothorax. There is thoracic spondylosis.      Impression:  1. Cardiomegaly and probable volume overload. Inflammatory/infectious infiltrates felt to be less likely. Follow-up to clearing recommended. No pneumothorax.  This report was finalized on 8/1/2023 2:42 PM by Dr. Tristan Cooley MD.      US Renal Bilateral    Result Date: 8/2/2023  Narrative: BILATERAL RENAL ULTRASOUND, 8/2/2023  HISTORY: Abnormal CT yesterday demonstrating probable complex cyst in the posterolateral left kidney measuring 10 mm. Ultrasound requested for further evaluation and to exclude solid mass.  TECHNIQUE: Grayscale ultrasound imaging of both kidneys and the urinary bladder was performed. Correlation made with abdomen and pelvis CT 8/1/2023.  FINDINGS: Bladder decompressed and not well visualized or assessed. The right kidney is nonobstructed and measures approximately 9.7 cm. No shadowing stone. There is cortical thinning. There is a simple cyst laterally measuring 1.2 cm corresponding with a simple cyst on prior CT. The left kidney is nonobstructed and also demonstrates cortical thinning. Left kidney measures 11.1 cm. No shadowing stone. There is a simple cyst in the lateral lower pole left kidney measuring 1.3 cm corresponding to a simple cyst on the prior CT. The lesion of interest in the lateral mid to lower pole  left kidney measuring 10 mm is not visible on ultrasound imaging today. Given patient age category and diminished renal function, rather than proceeding with further evaluation with dedicated renal protocol CT, consider 6-month follow-up noncontrast CT to reassess stability of the probable proteinaceous cyst on the prior CT study.      Impression:  1. No hydronephrosis or shadowing stone on either side. Cortical thinning that is nonspecific but often associated with chronic renal parenchymal disease. 2. The lesion of interest felt to represent a proteinaceous/hemorrhagic cyst in the lateral midpole left kidney is not visible on ultrasound today. See discussion above regarding 6-month follow-up noncontrast CT. 3. Bilateral simple renal cysts corresponding to simple cysts on prior CT.  This report was finalized on 8/2/2023 9:42 AM by Dr. Tristan Cooley MD.       Allergies:  is allergic to clinoril [sulindac], codeine, coumadin [warfarin sodium], ibuprofen-oxycodone [oxycodone-ibuprofen], keflex [cephalexin], mydriacyl [tropicamide], ben-synephrine [phenylephrine], penicillins, phenylephrine hcl (pressors), sulfa antibiotics, and zantac [ranitidine hcl].    Jah  Reviewed     Discharge Medications:     Discharge Medications        Changes to Medications        Instructions Start Date   pantoprazole 40 MG EC tablet  Commonly known as: Protonix  What changed: when to take this   40 mg, Oral, 2 Times Daily Before Meals             Continue These Medications        Instructions Start Date   acetaminophen 325 MG tablet  Commonly known as: TYLENOL   487.5 mg, Oral, Every 4 Hours PRN      alendronate 70 MG tablet  Commonly known as: FOSAMAX   70 mg, Oral, Every 7 Days      amiodarone 200 MG tablet  Commonly known as: PACERONE   200 mg, Oral, Daily      amLODIPine 5 MG tablet  Commonly known as: NORVASC   5 mg, Oral, Daily      bisacodyl 5 MG EC tablet  Commonly known as: DULCOLAX   5 mg, Oral, Daily PRN      cholecalciferol  10 MCG (400 UNIT) tablet  Commonly known as: VITAMIN D3   400 Units, Oral, 2 Times Daily      docusate sodium 100 MG capsule  Commonly known as: COLACE   100 mg, Oral, 2 Times Daily PRN      ferrous sulfate 325 (65 FE) MG tablet   325 mg, Oral, Daily With Breakfast      gabapentin 100 MG capsule  Commonly known as: NEURONTIN   100 mg, Oral, 2 Times Daily      Magnesium Oxide -Mg Supplement 400 (240 Mg) MG tablet   400 mg, Oral, Daily      metoprolol tartrate 25 MG tablet  Commonly known as: LOPRESSOR   12.5 mg, Oral, Every 12 Hours Scheduled      mirtazapine 15 MG tablet  Commonly known as: REMERON   15 mg, Oral, Nightly      oyster shell calcium/vitamin d 250-125 MG-UNIT tablet tablet   2 tablets, Oral, 2 Times Daily      pravastatin 80 MG tablet  Commonly known as: PRAVACHOL   80 mg, Oral, Daily               Last Lab Results:   Lab Results (most recent)       Procedure Component Value Units Date/Time    CBC & Differential [[id number]]  (Abnormal) Collected: 08/17/23 0441    Specimen: Blood Updated: 08/17/23 0502    Narrative:      The following orders were created for panel order CBC & Differential.  Procedure                               Abnormality         Status                     ---------                               -----------         ------                     CBC Auto Differential[[id number]]        Abnormal            Final result                 Please view results for these tests on the individual orders.    CBC Auto Differential [[id number]]  (Abnormal) Collected: 08/17/23 0441    Specimen: Blood Updated: 08/17/23 0502     WBC 10.05 10*3/mm3      RBC 2.95 10*6/mm3      Hemoglobin 8.9 g/dL      Hematocrit 27.9 %      MCV 94.6 fL      MCH 30.2 pg      MCHC 31.9 g/dL      RDW 21.7 %      RDW-SD 66.5 fl      MPV 10.1 fL      Platelets 210 10*3/mm3      Neutrophil % 67.6 %      Lymphocyte % 18.7 %      Monocyte % 10.7 %      Eosinophil % 2.5 %      Basophil % 0.2 %      Immature Grans % 0.3 %       Neutrophils, Absolute 6.79 10*3/mm3      Lymphocytes, Absolute 1.88 10*3/mm3      Monocytes, Absolute 1.08 10*3/mm3      Eosinophils, Absolute 0.25 10*3/mm3      Basophils, Absolute 0.02 10*3/mm3      Immature Grans, Absolute 0.03 10*3/mm3     Urine Culture - Urine, Urine, Clean Catch [[id number]]  (Abnormal)  (Susceptibility) Collected: 08/15/23 1710    Specimen: Urine, Clean Catch Updated: 08/17/23 0000     Urine Culture >100,000 CFU/mL Klebsiella pneumoniae ssp pneumoniae    Narrative:      Colonization of the urinary tract without infection is common. Treatment is discouraged unless the patient is symptomatic, pregnant, or undergoing an invasive urologic procedure.    Susceptibility        Klebsiella pneumoniae ssp pneumoniae      TANYA      Ampicillin Resistant      Ampicillin + Sulbactam Susceptible      Cefazolin Susceptible      Cefepime Susceptible      Ceftazidime Susceptible      Ceftriaxone Susceptible      Gentamicin Susceptible      Levofloxacin Susceptible      Nitrofurantoin Intermediate      Piperacillin + Tazobactam Susceptible      Trimethoprim + Sulfamethoxazole Susceptible                           Hemoglobin & Hematocrit, Blood [[id number]]  (Abnormal) Collected: 08/16/23 2342    Specimen: Blood Updated: 08/16/23 2347     Hemoglobin 9.2 g/dL      Hematocrit 28.9 %     CBC & Differential [[id number]]  (Abnormal) Collected: 08/16/23 1302    Specimen: Blood Updated: 08/16/23 1418    Narrative:      The following orders were created for panel order CBC & Differential.  Procedure                               Abnormality         Status                     ---------                               -----------         ------                     CBC Auto Differential[[id number]]        Abnormal            Final result                 Please view results for these tests on the individual orders.    CBC Auto Differential [[id number]]  (Abnormal) Collected: 08/16/23 1302    Specimen: Blood Updated: 08/16/23 1418      WBC 12.95 10*3/mm3      RBC 2.56 10*6/mm3      Hemoglobin 7.8 g/dL      Hematocrit 25.4 %      MCV 99.2 fL      MCH 30.5 pg      MCHC 30.7 g/dL      RDW 23.8 %      RDW-SD 79.1 fl      MPV 11.6 fL      Platelets 278 10*3/mm3      Neutrophil % 66.8 %      Lymphocyte % 21.2 %      Monocyte % 9.1 %      Eosinophil % 1.5 %      Basophil % 0.5 %      Immature Grans % 0.9 %      Neutrophils, Absolute 8.65 10*3/mm3      Lymphocytes, Absolute 2.74 10*3/mm3      Monocytes, Absolute 1.18 10*3/mm3      Eosinophils, Absolute 0.20 10*3/mm3      Basophils, Absolute 0.06 10*3/mm3      Immature Grans, Absolute 0.12 10*3/mm3      nRBC 0.3 /100 WBC     Hemoglobin & Hematocrit, Blood [[id number]]  (Abnormal) Collected: 08/16/23 1302    Specimen: Blood Updated: 08/16/23 1307     Hemoglobin 7.7 g/dL      Hematocrit 25.5 %     High Sensitivity Troponin T 2Hr [[id number]]  (Abnormal) Collected: 08/15/23 1819    Specimen: Blood Updated: 08/15/23 1838     HS Troponin T 25 ng/L      Troponin T Delta -1 ng/L     Narrative:      High Sensitive Troponin T Reference Range:  <10.0 ng/L- Negative Female for AMI  <15.0 ng/L- Negative Male for AMI  >=10 - Abnormal Female indicating possible myocardial injury.  >=15 - Abnormal Male indicating possible myocardial injury.   Clinicians would have to utilize clinical acumen, EKG, Troponin, and serial changes to determine if it is an Acute Myocardial Infarction or myocardial injury due to an underlying chronic condition.         Urinalysis, Microscopic Only - Urine, Clean Catch [[id number]]  (Abnormal) Collected: 08/15/23 1710    Specimen: Urine, Clean Catch Updated: 08/15/23 1736     RBC, UA None Seen /HPF      WBC, UA 13-20 /HPF      Bacteria, UA 4+ /HPF      Squamous Epithelial Cells, UA 0-2 /HPF      Hyaline Casts, UA None Seen /LPF      Methodology Manual Light Microscopy    Urinalysis With Culture If Indicated - Urine, Clean Catch [[id number]]  (Abnormal) Collected: 08/15/23 1710    Specimen: Urine,  Clean Catch Updated: 08/15/23 1724     Color, UA Dark Yellow     Comment: Any Substance that causes an abnormal urine color can alter the accuracy of the chemical reactions.  Corrected result. Previous result was Green on 8/15/2023 at 1722 EDT.        Appearance, UA Cloudy     pH, UA 5.5     Specific Gravity, UA 1.015     Glucose, UA Negative     Ketones, UA Negative     Bilirubin, UA Negative     Blood, UA Negative     Protein, UA Negative     Leuk Esterase, UA Small (1+)     Nitrite, UA Negative     Urobilinogen, UA 0.2 E.U./dL    Narrative:      In absence of clinical symptoms, the presence of pyuria, bacteria, and/or nitrites on the urinalysis result does not correlate with infection.    Protime-INR [[id number]]  (Abnormal) Collected: 08/15/23 1557    Specimen: Blood Updated: 08/15/23 1718     Protime 31.3 Seconds      INR 3.09    Narrative:      Therapeutic Ranges for INR: 2.0-3.0 (PT 20-30)                              2.5-3.5 (PT 25-34)    Scan Slide [[id number]] Collected: 08/15/23 1557    Specimen: Blood Updated: 08/15/23 1659     RBC Morphology Normal     WBC Morphology Normal     Platelet Estimate Increased    Blood Gas, Arterial - [[id number]]  (Abnormal) Collected: 08/15/23 1650    Specimen: Arterial Blood Updated: 08/15/23 1655     Site Right Radial     Silver's Test Positive     pH, Arterial 7.490 pH units      Comment: 83 Value above reference range        pCO2, Arterial 42.3 mm Hg      pO2, Arterial 96.6 mm Hg      HCO3, Arterial 32.2 mmol/L      Comment: 83 Value above reference range        Base Excess, Arterial 8.1 mmol/L      Comment: 83 Value above reference range        O2 Saturation, Arterial 99.1 %      Comment: 83 Value above reference range        Hemoglobin, Blood Gas 5.4 g/dL      Temperature 37.0 C      Barometric Pressure for Blood Gas 738 mmHg      Modality Nasal Cannula     Flow Rate 2.0 lpm      Collected by [id number]     Comment: Meter: [id number]     :  [id number]         "pCO2, Temperature Corrected 42.3 mm Hg      pH, Temp Corrected 7.490 pH Units      pO2, Temperature Corrected 96.6 mm Hg     Procalcitonin [[id number]]  (Normal) Collected: 08/15/23 1557    Specimen: Blood Updated: 08/15/23 1632     Procalcitonin 0.16 ng/mL     Narrative:      As a Marker for Sepsis (Non-Neonates):    1. <0.5 ng/mL represents a low risk of severe sepsis and/or septic shock.  2. >2 ng/mL represents a high risk of severe sepsis and/or septic shock.    As a Marker for Lower Respiratory Tract Infections that require antibiotic therapy:    PCT on Admission    Antibiotic Therapy       6-12 Hrs later    >0.5                Strongly Recommended  >0.25 - <0.5        Recommended   0.1 - 0.25          Discouraged              Remeasure/reassess PCT  <0.1                Strongly Discouraged     Remeasure/reassess PCT    As 28 day mortality risk marker: \"Change in Procalcitonin Result\" (>80% or <=80%) if Day 0 (or Day 1) and Day 4 values are available. Refer to [email or web address]-[email or web address]-[email or web address]    Change in PCT <=80%  A decrease of PCT levels below or equal to 80% defines a positive change in PCT test result representing a higher risk for 28-day all-cause mortality of patients diagnosed with severe sepsis for septic shock.    Change in PCT >80%  A decrease of PCT levels of more than 80% defines a negative change in PCT result representing a lower risk for 28-day all-cause mortality of patients diagnosed with severe sepsis or septic shock.       High Sensitivity Troponin T [[id number]]  (Abnormal) Collected: 08/15/23 1557    Specimen: Blood Updated: 08/15/23 1631     HS Troponin T 26 ng/L     Narrative:      High Sensitive Troponin T Reference Range:  <10.0 ng/L- Negative Female for AMI  <15.0 ng/L- Negative Male for AMI  >=10 - Abnormal Female indicating possible myocardial injury.  >=15 - Abnormal Male indicating possible myocardial injury.   Clinicians would have to utilize clinical acumen, EKG, Troponin, and " serial changes to determine if it is an Acute Myocardial Infarction or myocardial injury due to an underlying chronic condition.         Comprehensive Metabolic Panel [[id number]]  (Abnormal) Collected: 08/15/23 1557    Specimen: Blood Updated: 08/15/23 1629     Glucose 119 mg/dL      BUN 58 mg/dL      Creatinine 2.14 mg/dL      Sodium 140 mmol/L      Potassium 3.3 mmol/L      Chloride 97 mmol/L      CO2 30.6 mmol/L      Calcium 9.0 mg/dL      Total Protein 6.0 g/dL      Albumin 4.0 g/dL      ALT (SGPT) 7 U/L      AST (SGOT) 13 U/L      Alkaline Phosphatase 68 U/L      Total Bilirubin 0.3 mg/dL      Globulin 2.0 gm/dL      A/G Ratio 2.0 g/dL      BUN/Creatinine Ratio 27.1     Anion Gap 12.4 mmol/L      eGFR 21.3 mL/min/1.73     Narrative:      GFR Normal >60  Chronic Kidney Disease <60  Kidney Failure <15    The GFR formula is only valid for adults with stable renal function between ages 18 and 70.          Imaging Results (Most Recent)       Procedure Component Value Units Date/Time    NM GI Blood Loss [[id number]] Collected: 08/17/23 0126     Updated: 08/17/23 0132    Narrative:      RADIONUCLIDE TAGGED RED BLOOD CELL GI BLEEDING STUDY, 8/16/2023     HISTORY:   89-year-old female hospital inpatient with repeated hospital admissions  for acute on chronic GI blood loss anemia requiring iron infusion.  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 She has ongoing dark black  stools but no currently reported bright red bleeding per rectum.     DOSE:   27.9 mCi Ultratag labeled autologous red blood cells.     COMPARISON:   CT abdomen/pelvis, 8/1/2023.     FINDINGS:   Physiologic intravascular red blood cell activity on early dynamic  images and delayed images within the abdomen and pelvis. No visible  abnormal focal accumulation of red blood cell activity to suggest active  GI bleeding occurring during the duration of the examination.       Impression:      Negative tagged red blood cell GI bleeding study. No evidence of  active  GI bleeding at the time of imaging.        This report was finalized on 8/17/2023 1:30 AM by Dr. Jose Alba MD.       XR Chest 2 View [[id number]] Collected: 08/15/23 1626     Updated: 08/15/23 1630    Narrative:      CHEST, 2 VIEWS     HISTORY:   Generalized weakness     Comparison:  8/1/2023     FINDINGS:  Lung volumes are diminished. The heart size is enlarged. Mediastinal  structures are midline.     Mild fullness of the pulmonary vascularity centrally. Minimally  increased interstitial markings bilaterally. No pleural fluid. No  pneumothorax.       Impression:      Cardiomegaly. Mild prominence of the pulmonary vascularity  centrally. Similar findings to prior study of 8/1/2023.        This report was finalized on 8/15/2023 4:28 PM by Dr. Po Ramsey MD.               PROCEDURES      Condition on Discharge:  Stable, Improved.     Physical Exam at Discharge  Vital Signs  Temp:  [97.8 øF (36.6 øC)-98.5 øF (36.9 øC)] 98.1 øF (36.7 øC)  Heart Rate:  [65-73] 73  Resp:  [16-20] 20  BP: (102-120)/(58-72) 102/64    Physical Exam  Physical Exam:  General: Patient is awake and alert. Elderly female. No acute distress noted.   HENT: Head is atraumatic, normocephalic. Hearing is grossly intact. Nose is without obvious congestion and appears patent. Neck is supple and trachea is midline.   Eyes: Vision is grossly intact. Pupils appear equal and round.   Cardiovascular: Heart has regular rate and rhythm with no murmurs, rubs or gallops noted.   Respiratory: Lungs are clear to ausculation without wheezes, rhonchi or rales.   Abdominal/GI: Soft, nontender, bowel sounds present. No rebound or guarding present.   Extremities: No peripheral edema noted.   Musculoskeletal: Spontaneous movement of bilateral upper and lower extremities against gravity noted. No signs of injury or deformity noted.   Skin: Warm and dry.   Psych: Mood and affect are appropriate. Cooperative with exam.   Neuro: No facial asymmetry noted.  No focal deficits noted, hearing and vision are grossly intact. Repeated lip movement as per baseline.       Discharge Disposition  To home in stable condition     Visiting Nurse:    No    Home PT/OT:  No    Home Safety Evaluation:  No    DME  No new equipment    Discharge Diet:      Dietary Orders (From admission, onward)       Start     Ordered    08/15/23 1809  Diet: Liquid Diets; Clear Liquid; Texture: Regular Texture (IDDSI 7); Fluid Consistency: Thin (IDDSI 0)  Diet Effective Now        References:    Diet Order [place]   Question Answer Comment   Diets: Liquid Diets    Liquid Diet: Clear Liquid    Texture: Regular Texture (IDDSI 7)    Fluid Consistency: Thin (IDDSI 0)        08/15/23 1808                    Activity at Discharge:  As tolerated    Pre-discharge education  None       Follow-up Appointments  No future appointments.  Additional Instructions for the Follow-ups that You Need to Schedule       Discharge Follow-up with PCP   As directed       Currently Documented PCP:    Jimena Aj MD    PCP Phone Number:    [phone]     Follow Up Details: within 1 week                Test Results Pending at Discharge      Discharge over 30 min (if over 30 minutes give explanation as to why it took greater than 30 minutes)  Secondary to:   Coordination of care/follow up  Medication reconciliation  D/W patient      At [facility], we believe that sharing information builds trust and better relationships. You are receiving this note because you recently visited [facility]. It is possible you will see health information before a provider has talked with you about it. This kind of information can be easy to misunderstand. To help you fully understand what it means for your health, we urge you to discuss this note with your provider.    Alberto Newman DO  08/17/23  11:44 EDT

## 2023-08-18 ENCOUNTER — READMISSION MANAGEMENT (OUTPATIENT)
Dept: CALL CENTER | Facility: HOSPITAL | Age: 88
End: 2023-08-18
Payer: MEDICARE

## 2023-08-18 NOTE — OUTREACH NOTE
Medical Week 1 Survey      Flowsheet Row Responses   [facility] facility patient discharged from? [place]   Does the patient have one of the following disease processes/diagnoses(primary or secondary)? Other   Week 1 attempt successful? No   Unsuccessful attempts Attempt 1            Esther AVILA - Registered Nurse

## 2023-08-18 NOTE — CASE MANAGEMENT/SOCIAL WORK
Case Management Discharge Note      Final Note: Discharged home with [facility]         Selected Continued Care - Discharged on 8/17/2023 Admission date: 8/15/2023 - Discharge disposition: Home or Self Care      Destination    No services have been selected for the patient.                Durable Medical Equipment    No services have been selected for the patient.                Dialysis/Infusion    No services have been selected for the patient.                Home Medical Care       Service Provider Selected Services Address Phone Fax Patient Preferred    [facility] [address] [address] [phone] --              Therapy    No services have been selected for the patient.                Community Resources    No services have been selected for the patient.                Community & DME    No services have been selected for the patient.                    Selected Continued Care - Prior Encounters Includes continued care and service providers with selected services from prior encounters from 5/17/2023 to 8/17/2023      Discharged on 6/22/2023 Admission date: 6/18/2023 - Discharge disposition: Home-Health Care c      Home Medical Care       Service Provider Selected Services Address Phone Fax Patient Preferred    [facility] [address] [address] [phone] --                               Final Discharge Disposition Code: 06 - home with home health care

## 2023-08-18 NOTE — OUTREACH NOTE
Prep Survey      Flowsheet Row Responses   [facility] facility patient discharged from? [place]   Is LACE score < 7 ? No   Eligibility Readm Mgmt   Discharge diagnosis Anemia   Does the patient have one of the following disease processes/diagnoses(primary or secondary)? Other   Does the patient have Home health ordered? Yes   What is the Home health agency?  [facility]   Is there a DME ordered? No   Prep survey completed? Yes            DAVID GIL - Registered Nurse

## 2023-08-22 ENCOUNTER — READMISSION MANAGEMENT (OUTPATIENT)
Dept: CALL CENTER | Facility: HOSPITAL | Age: 88
End: 2023-08-22
Payer: MEDICARE

## 2023-08-22 NOTE — OUTREACH NOTE
Medical Week 1 Survey      Flowsheet Row Responses   [facility] patient discharged from? [place]   Does the patient have one of the following disease processes/diagnoses(primary or secondary)? Other   Week 1 attempt successful? Yes   Call start time 1539   Call end time 1544   Discharge diagnosis Acute on chronic anemia secondary to GI bleed   Is patient permission given to speak with other caregiver? Yes   Person spoke with today (if not patient) and relationship Ivory Buckley   Meds reviewed with patient/caregiver? Yes   Does the patient have all medications ordered at discharge? Yes   Is the patient taking all medications as directed (includes completed medication regime)? Yes  [Daughter reports that she sets up patient pill box weekly and staff at [facility] check to make sure patient has taken her medicines. ]   Does the patient have a primary care provider?  Yes   Does the patient have an appointment with their PCP within 7 days of discharge? Yes   Comments regarding PCP Daughter states patients provider is scheduled to see her today   Has the patient kept scheduled appointments due by today? N/A   Comments Reports that they are still trying to get the Gastroenterology appt arranged.   What is the Home health agency?  [facility]   Has home health visited the patient within 72 hours of discharge? Yes   Psychosocial issues? No   Did the patient receive a copy of their discharge instructions? Yes   Nursing interventions Reviewed instructions with patient  [daughter]   What is the patient's perception of their health status since discharge? Improving   Is the patient/caregiver able to teach back the hierarchy of who to call/visit for symptoms/problems? PCP, Specialist, Home health nurse, Urgent Care, ED, 911 Yes   If the patient is a current smoker, are they able to teach back resources for cessation? Not a smoker   Week 1 call completed? Yes   Would this patient benefit from a Referral to [facility]  Work? No   Is the patient interested in additional calls from an ambulatory ? No   Call end time [phone]            MAURICE SHAH - Registered Nurse

## 2023-08-30 ENCOUNTER — READMISSION MANAGEMENT (OUTPATIENT)
Dept: CALL CENTER | Facility: HOSPITAL | Age: 88
End: 2023-08-30
Payer: MEDICARE

## 2023-08-30 NOTE — OUTREACH NOTE
Medical Week 2 Survey      Flowsheet Row Responses   [facility] facility patient discharged from? [place]   Does the patient have one of the following disease processes/diagnoses(primary or secondary)? Other   Week 2 attempt successful? No   Unsuccessful attempts Attempt 1            DAVID GIL - Registered Nurse

## 2023-09-20 ENCOUNTER — TELEPHONE (OUTPATIENT)
Dept: GASTROENTEROLOGY | Facility: CLINIC | Age: 88
End: 2023-09-20

## 2023-09-20 DIAGNOSIS — R19.5 OCCULT GI BLEEDING: Primary | ICD-10-CM

## 2023-09-20 NOTE — TELEPHONE ENCOUNTER
Caller: Ina Goodman    Relationship: Emergency Contact    Best call back number: [phone]    What is the best time to reach you: ANYTIME    Who are you requesting to speak with (clinical staff, provider,  specific staff member): CLINICAL       What was the call regarding: PATIENT'S DAUGHTER INA CALLED TO CHECK ON THE NEXT STEP ON GETTING HER MOTHER SCHEDULED FOR A ENDOSCOPY PILL SWALLOW. WHEN HER MOTHER WENT TO THE ER ON 08/15/23, DR. VUONG WAS RECOMMENDING THIS. SHE HASN'T HEARD ANYTHING FROM [facility] AND DOESN'T KNOW WHAT SHE NEEDS TO DO.

## 2023-09-20 NOTE — TELEPHONE ENCOUNTER
Upon investigation pt was seen in hospital per md note out pt capsule study. Will need a referral to Dr. Eusebio ALMANZAR to complete.   Sent to Dr. Carr

## 2023-11-06 ENCOUNTER — TELEPHONE (OUTPATIENT)
Dept: GASTROENTEROLOGY | Facility: CLINIC | Age: 88
End: 2023-11-06

## 2023-11-06 NOTE — TELEPHONE ENCOUNTER
We received a referral for - Occult GI bleeding  from [place] she will need a capsule study please call and schedule

## 2023-11-08 ENCOUNTER — TELEPHONE (OUTPATIENT)
Dept: GASTROENTEROLOGY | Facility: CLINIC | Age: 88
End: 2023-11-08
Payer: MEDICARE

## 2023-11-15 ENCOUNTER — TRANSCRIBE ORDERS (OUTPATIENT)
Dept: CT IMAGING | Facility: HOSPITAL | Age: 88
End: 2023-11-15
Payer: MEDICARE

## 2023-11-15 DIAGNOSIS — R05.9 COUGH, UNSPECIFIED TYPE: ICD-10-CM

## 2023-11-15 DIAGNOSIS — R09.89 ABNORMAL LUNG SOUNDS: Primary | ICD-10-CM

## 2023-11-15 DIAGNOSIS — R91.8 ABNORMAL X-RAY OF LUNG: ICD-10-CM

## 2023-11-22 ENCOUNTER — TELEPHONE (OUTPATIENT)
Dept: ORTHOPEDIC SURGERY | Facility: CLINIC | Age: 88
End: 2023-11-22
Payer: MEDICARE

## 2023-11-22 NOTE — TELEPHONE ENCOUNTER
Tried calling patients POA to schedule appt for a referral we received for her right hip. Left vm to call back to schedule. Hub is okay to schedule as a new problem with Dr. Varma

## 2023-11-27 ENCOUNTER — TELEPHONE (OUTPATIENT)
Dept: GASTROENTEROLOGY | Facility: CLINIC | Age: 88
End: 2023-11-27
Payer: MEDICARE

## 2023-11-27 ENCOUNTER — TELEPHONE (OUTPATIENT)
Dept: GASTROENTEROLOGY | Facility: CLINIC | Age: 88
End: 2023-11-27

## 2023-11-27 NOTE — TELEPHONE ENCOUNTER
[employer] is not involved in this. Cait Moses works for [employer]. I sent a message to Sapna to see why this patient hasn't been contacted. I will keep you posted

## 2023-11-27 NOTE — TELEPHONE ENCOUNTER
Please review this chart.  It appears that this was originally your patient, but subsequently underwent procedures by both Michael and Bruno.  Then there is a referral in for [facility] for capsule study, but it appears that staff @ [facility] got involved.  Are you aware of what is going on with this situation?  Please look at the communications in the referral and the telephone notes. I'm working the referral queue, and this was put back in our queue. Any insight would be helpful.  Thanks.

## 2023-11-30 ENCOUNTER — TELEPHONE (OUTPATIENT)
Dept: GASTROENTEROLOGY | Facility: CLINIC | Age: 88
End: 2023-11-30
Payer: MEDICARE

## 2023-12-04 ENCOUNTER — HOSPITAL ENCOUNTER (OUTPATIENT)
Dept: CT IMAGING | Facility: HOSPITAL | Age: 88
Discharge: HOME | End: 2023-12-04
Admitting: NURSE PRACTITIONER
Payer: MEDICARE

## 2023-12-04 DIAGNOSIS — R09.89 ABNORMAL LUNG SOUNDS: ICD-10-CM

## 2023-12-04 DIAGNOSIS — R05.9 COUGH, UNSPECIFIED TYPE: ICD-10-CM

## 2023-12-04 DIAGNOSIS — R91.8 ABNORMAL X-RAY OF LUNG: ICD-10-CM

## 2023-12-04 LAB — CREAT BLDA-MCNC: 1.9 MG/DL (ref 0.6–1.3)

## 2023-12-04 PROCEDURE — 82565 ASSAY OF CREATININE: CPT

## 2023-12-04 PROCEDURE — 71250 CT THORAX DX C-: CPT

## 2023-12-29 NOTE — TELEPHONE ENCOUNTER
Spoke with pt's daughter Ina pt will have a new insurance at the first of the year [employer] no premium open ppo which she has already verified with the plan that we will be in network it goes in to effect January 1 2024 id# [id number] provider line [phone] auth# [id number]

## 2024-01-22 ENCOUNTER — LAB REQUISITION (OUTPATIENT)
Dept: LAB | Facility: HOSPITAL | Age: 89
End: 2024-01-22
Payer: MEDICARE

## 2024-01-22 DIAGNOSIS — D50.9 IRON DEFICIENCY ANEMIA, UNSPECIFIED: ICD-10-CM

## 2024-01-22 DIAGNOSIS — I13.0 HYPERTENSIVE HEART AND CHRONIC KIDNEY DISEASE WITH HEART FAILURE AND STAGE 1 THROUGH STAGE 4 CHRONIC KIDNEY DISEASE, OR UNSPECIFIED CHRONIC KIDNEY DISEASE: ICD-10-CM

## 2024-01-22 LAB
BASOPHILS # BLD AUTO: 0.03 10*3/MM3 (ref 0–0.2)
BASOPHILS NFR BLD AUTO: 0.3 % (ref 0–1.5)
DEPRECATED RDW RBC AUTO: 50.8 FL (ref 37–54)
EOSINOPHIL # BLD AUTO: 0.16 10*3/MM3 (ref 0–0.4)
EOSINOPHIL NFR BLD AUTO: 1.4 % (ref 0.3–6.2)
ERYTHROCYTE [DISTWIDTH] IN BLOOD BY AUTOMATED COUNT: 14.2 % (ref 12.3–15.4)
HCT VFR BLD AUTO: 44.1 % (ref 34–46.6)
HGB BLD-MCNC: 14.9 G/DL (ref 12–15.9)
IMM GRANULOCYTES # BLD AUTO: 0.05 10*3/MM3 (ref 0–0.05)
IMM GRANULOCYTES NFR BLD AUTO: 0.4 % (ref 0–0.5)
LYMPHOCYTES # BLD AUTO: 2.39 10*3/MM3 (ref 0.7–3.1)
LYMPHOCYTES NFR BLD AUTO: 20.2 % (ref 19.6–45.3)
MCH RBC QN AUTO: 32.8 PG (ref 26.6–33)
MCHC RBC AUTO-ENTMCNC: 33.8 G/DL (ref 31.5–35.7)
MCV RBC AUTO: 97.1 FL (ref 79–97)
MONOCYTES # BLD AUTO: 1.65 10*3/MM3 (ref 0.1–0.9)
MONOCYTES NFR BLD AUTO: 13.9 % (ref 5–12)
NEUTROPHILS NFR BLD AUTO: 63.8 % (ref 42.7–76)
NEUTROPHILS NFR BLD AUTO: 7.55 10*3/MM3 (ref 1.7–7)
NRBC BLD AUTO-RTO: 0 /100 WBC (ref 0–0.2)
PLATELET # BLD AUTO: 213 10*3/MM3 (ref 140–450)
PMV BLD AUTO: 11 FL (ref 6–12)
RBC # BLD AUTO: 4.54 10*6/MM3 (ref 3.77–5.28)
WBC NRBC COR # BLD AUTO: 11.83 10*3/MM3 (ref 3.4–10.8)

## 2024-01-22 PROCEDURE — 85025 COMPLETE CBC W/AUTO DIFF WBC: CPT | Performed by: NURSE PRACTITIONER

## 2024-02-21 ENCOUNTER — TELEPHONE (OUTPATIENT)
Dept: GASTROENTEROLOGY | Facility: CLINIC | Age: 89
End: 2024-02-21
Payer: MEDICARE

## 2024-02-21 ENCOUNTER — CLINICAL SUPPORT (OUTPATIENT)
Dept: GASTROENTEROLOGY | Facility: CLINIC | Age: 89
End: 2024-02-21
Payer: MEDICARE

## 2024-02-21 DIAGNOSIS — D64.9 ANEMIA, UNSPECIFIED TYPE: Primary | ICD-10-CM

## 2024-02-21 PROCEDURE — 91110 GI TRC IMG INTRAL ESOPH-ILE: CPT | Performed by: INTERNAL MEDICINE

## 2024-02-29 ENCOUNTER — TELEPHONE (OUTPATIENT)
Dept: GASTROENTEROLOGY | Facility: CLINIC | Age: 89
End: 2024-02-29
Payer: MEDICARE

## 2024-02-29 NOTE — TELEPHONE ENCOUNTER
Ms. Knapp's daughter, Ina Bhatt, called to check on the result on the swallow test (capsule) she had last week. She was told a week to her daughter and Ms. Knapp would get the result. She would like a call back about the results.     Update. Thank you.

## 2024-03-04 NOTE — PROGRESS NOTES
RM:________    Referral Provider: No ref. provider found Jaymie Aj APRN    NEW PATIENT/ CONSULT  PREVIOUS CARDIOLOGIST: ______________________________    CARDIAC TESTING: __________________________________________________    : 1930   AGE: 89 y.o.    2024  REASON FOR VISIT/  CC:      WT: ____________ BP: __________L __________R/ HR_______________    ALLERGIES:  Clinoril [sulindac], Codeine, Coumadin [warfarin sodium], Ibuprofen-oxycodone [oxycodone-ibuprofen], Keflex [cephalexin], Mydriacyl [tropicamide], Don-synephrine [phenylephrine], Penicillins, Phenylephrine hcl (pressors), Sulfa antibiotics, and Zantac [ranitidine hcl]  SMOKING HISTORY  Social History     Tobacco Use    Smoking status: Never    Smokeless tobacco: Never   Vaping Use    Vaping status: Never Used   Substance Use Topics    Alcohol use: No    Drug use: No     Single     H/O: MI_____   STROKE________   GOUT_____   ANEMIA______     CAROTID________ HIV____ CAD_______ HYPERCHOL _____    H/O: CHF _____   RF____ DM___ HTN_______PVD____THYROID DISEASE_______    PMH: GI ____   HEPATITIS ___ KIDNEY DISEASE ___ LUNG DISEASE _______     SLEEP APNEA ____ BLOOD CLOTS ____ DVT ____ VEIN STRIPPING ___________      STOP BANG _________ (CARDIO ONCOLOGY ONLY)    CANCER _________________________________ CHEMO/ RADIATION__________

## 2024-03-05 ENCOUNTER — OFFICE VISIT (OUTPATIENT)
Dept: CARDIOLOGY | Facility: CLINIC | Age: 89
End: 2024-03-05
Payer: MEDICARE

## 2024-03-05 VITALS
HEART RATE: 71 BPM | HEIGHT: 62 IN | BODY MASS INDEX: 33.6 KG/M2 | DIASTOLIC BLOOD PRESSURE: 70 MMHG | SYSTOLIC BLOOD PRESSURE: 140 MMHG | WEIGHT: 182.6 LBS

## 2024-03-05 DIAGNOSIS — Z92.29 H/O AMIODARONE THERAPY: ICD-10-CM

## 2024-03-05 DIAGNOSIS — D50.0 ANEMIA DUE TO GI BLOOD LOSS: ICD-10-CM

## 2024-03-05 DIAGNOSIS — N18.4 CKD (CHRONIC KIDNEY DISEASE) STAGE 4, GFR 15-29 ML/MIN: ICD-10-CM

## 2024-03-05 DIAGNOSIS — I48.0 PAROXYSMAL ATRIAL FIBRILLATION: Primary | ICD-10-CM

## 2024-03-05 DIAGNOSIS — I10 PRIMARY HYPERTENSION: ICD-10-CM

## 2024-03-05 DIAGNOSIS — I65.22 STENOSIS OF LEFT CAROTID ARTERY: ICD-10-CM

## 2024-03-05 PROBLEM — D64.9 ACUTE ON CHRONIC ANEMIA: Status: RESOLVED | Noted: 2023-08-01 | Resolved: 2024-03-05

## 2024-03-05 PROBLEM — D68.32 HEMORRHAGIC DISORDER DUE TO EXTRINSIC CIRCULATING ANTICOAGULANTS: Status: RESOLVED | Noted: 2018-03-01 | Resolved: 2024-03-05

## 2024-03-05 PROBLEM — D62 ANEMIA DUE TO BLOOD LOSS, ACUTE: Status: RESOLVED | Noted: 2018-02-28 | Resolved: 2024-03-05

## 2024-03-05 PROBLEM — Z79.01 ANTICOAGULATED: Status: RESOLVED | Noted: 2022-08-17 | Resolved: 2024-03-05

## 2024-03-05 PROBLEM — T14.8XXA HEMATOMA: Status: RESOLVED | Noted: 2022-05-25 | Resolved: 2024-03-05

## 2024-03-05 PROBLEM — I50.33 ACUTE ON CHRONIC DIASTOLIC CONGESTIVE HEART FAILURE: Status: RESOLVED | Noted: 2018-03-01 | Resolved: 2024-03-05

## 2024-03-05 NOTE — PROGRESS NOTES
Date of Office Visit: 24  Encounter Provider: Iain Mackenzie MD  Place of Service: [facility] CARDIOLOGY  Patient Name: Aria Fernando  :1930    Chief Complaint   Patient presents with    Atrial Fibrillation   :     HPI:     Ms. Fernando is 89 y.o. and presents today to establish care. She was last seen by a different cardiologist in .     She has a history of paroxysmal atrial fibrillation and has been maintained on metoprolol, amiodarone, and rivaroxaban.  She had years of recurrent GI bleeding due to angiodysplasias, and in , anticoagulation was stopped permanently.  She has finally been able to recover from her anemia.  She has reported history of chronic diastolic heart failure.  She has a history of carotid disease status post left-sided endarterectomy.  She has a reported history of peripheral arterial disease but review of records seems like she merely has venous insufficiency for which she requires compression stockings.    She presents with her daughter.  She lives in independent living and she is extremely mentally sharp.  She gets around with a walker.  She denies chest pain, shortness of breath, lightheadedness, syncope, palpitations, orthopnea, or worsening lower extremity edema.  She expresses a desire to take less medicines, especially as she has trouble swallowing at baseline.    SPECT STRESS   Findings consistent with a normal ECG stress test.  Left ventricular ejection fraction is normal. (Calculated EF = 50%).  Myocardial perfusion imaging indicates a small-to-medium-sized infarct located in the lateral wall with mild kirk-infarct ischemia.  Impressions are consistent with an intermediate risk study.      ECHO   Left atrial cavity size is mildly dilated.  There is calcification of the aortic valve.  Mild-to-moderate mitral valve regurgitation is present  Mild tricuspid valve regurgitation is present.  Left Ventricle: Calculated EF =  62.9%.  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     Osteoarthritis     Paroxysmal atrial fibrillation     Polycythemia        Past Surgical History:   Procedure Laterality Date    APPENDECTOMY      CAROTID ENDARTERECTOMY Left     COLONOSCOPY N/A 03/03/2018    Procedure:  Colonoscopy to Terminal ileum with APC treatment for 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 and cold biopsy;  Surgeon: Terrie Carroll MD;  [facility];  Service:     COLONOSCOPY W/ POLYPECTOMY N/A 6/21/2023    Procedure: COLONOSCOPY WITH POLYPECTOMY AND APC;  Surgeon: Emiliano Rodriguez MD;  Location: [facility] OR;  Service: Gastroenterology;  Laterality: N/A;  transverse polyp-forcep  cecal time 1659  APC  DESCENDING POLYP  SIGMOID POLYP    ENDOSCOPY N/A 03/03/2018    Procedure: EGD with biopsy;  Surgeon: Terrie Carroll MD;  [facility];  Service:     ENDOSCOPY N/A 6/19/2023    Procedure: ESOPHAGOGASTRODUODENOSCOPY;  Surgeon: Emiliano Rodriguez MD;  Location: [facility] OR;  Service: Gastroenterology;  Laterality: N/A;  Reflux, Gastritis    ENDOSCOPY N/A 8/3/2023    Procedure: ENTEROSCOPY SMALL BOWEL;  Surgeon: Vadim Carr MD;  Location: [facility] OR;  Service: Gastroenterology;  Laterality: N/A;  tattoo to small bowel 60cm    KNEE ARTHROSCOPY Right        Social History     Socioeconomic History    Marital status: Single   Tobacco Use    Smoking status: Never    Smokeless tobacco: Never   Vaping Use    Vaping status: Never Used   Substance and Sexual Activity    Alcohol use: No    Drug use: No     Sexual activity: Never       History reviewed. No pertinent family history.    Review of Systems   Constitutional: Positive for malaise/fatigue.       Allergies   Allergen Reactions    Clinoril [Sulindac] Itching    Codeine Itching    Coumadin [Warfarin Sodium] GI Bleeding    Ibuprofen-Oxycodone [Oxycodone-Ibuprofen] Itching    Keflex [Cephalexin] Itching    Mydriacyl [Tropicamide] Angioedema    Don-Synephrine [Phenylephrine] Angioedema    Penicillins Angioedema    Phenylephrine Hcl (Pressors) Angioedema    Sulfa Antibiotics Angioedema    Zantac [Ranitidine Hcl] Nausea And Vomiting         Current Outpatient Medications:     acetaminophen (TYLENOL) 325 MG tablet, Take 1.5 tablets by mouth Every 4 (Four) Hours As Needed for Mild Pain., Disp: , Rfl:     alendronate (FOSAMAX) 70 MG tablet, Take 1 tablet by mouth Every 7 (Seven) Days., Disp: , Rfl:     amiodarone (PACERONE) 200 MG tablet, Take 1 tablet by mouth Daily., Disp: , Rfl:     amLODIPine (NORVASC) 5 MG tablet, Take 1 tablet by mouth Daily., Disp: , Rfl:     bisacodyl (DULCOLAX) 5 MG EC tablet, Take 1 tablet by mouth Daily As Needed for Constipation., Disp: , Rfl:     Calcium Carb-Cholecalciferol (OYSTER SHELL CALCIUM/VITAMIN D) 250-125 MG-UNIT tablet tablet, Take 2 tablets by mouth 2 (Two) Times a Day., Disp: , Rfl:     cholecalciferol (VITAMIN D3) 400 units tablet, Take 1 tablet by mouth 2 (Two) Times a Day., Disp: , Rfl:     docusate sodium (COLACE) 100 MG capsule, Take 1 capsule by mouth 2 (Two) Times a Day As Needed., Disp: , Rfl:     ferrous sulfate 325 (65 FE) MG tablet, Take 1 tablet by mouth Daily With Breakfast., Disp: , Rfl:     gabapentin (NEURONTIN) 100 MG capsule, Take 1 capsule by mouth 2 (Two) Times a Day., Disp: , Rfl:     Magnesium Oxide -Mg Supplement 400 (240 Mg) MG tablet, Take 1 tablet by mouth Daily., Disp: , Rfl:     metoprolol tartrate (LOPRESSOR) 25 MG tablet, Take 0.5 tablets by mouth Every 12 (Twelve) Hours., Disp: 15 tablet, Rfl:  "1    mirtazapine (REMERON) 15 MG tablet, Take 1 tablet by mouth Every Night., Disp: , Rfl:     pantoprazole (Protonix) 40 MG EC tablet, Take 1 tablet by mouth 2 (Two) Times a Day Before Meals., Disp: 30 tablet, Rfl: 0    pravastatin (PRAVACHOL) 80 MG tablet, Take 1 tablet by mouth Daily., Disp: , Rfl:       Objective:     Vitals:    03/05/24 1105   BP: 140/70   BP Location: Left arm   Pulse: 71   Weight: 82.8 kg (182 lb 9.6 oz)   Height: 157.5 cm (62\")     Body mass index is 33.4 kg/m².    Vitals reviewed.   Constitutional:       Appearance: Well-developed and not in distress.   Eyes:      Conjunctiva/sclera: Conjunctivae normal.   HENT:      Head: Normocephalic.      Nose: Nose normal.   Neck:      Vascular: No JVD. JVD normal.      Lymphadenopathy: No cervical adenopathy.   Pulmonary:      Effort: Pulmonary effort is normal.      Breath sounds: Normal breath sounds.   Cardiovascular:      Normal rate. Regular rhythm.      Murmurs: There is no murmur.   Pulses:     Intact distal pulses.   Edema:     Ankle: bilateral trace edema of the ankle.     Feet: bilateral trace edema of the feet.  Abdominal:      Palpations: Abdomen is soft.      Tenderness: There is no abdominal tenderness.   Musculoskeletal: Normal range of motion.      Cervical back: Normal range of motion. Skin:     General: Skin is warm and dry.   Neurological:      General: No focal deficit present.      Mental Status: Oriented to person, place, and time.      Cranial Nerves: No cranial nerve deficit.   Psychiatric:         Behavior: Behavior normal.         Thought Content: Thought content normal.         Judgment: Judgment normal.           ECG 12 Lead    Date/Time: 3/5/2024 12:21 PM  Performed by: Iain Mackenzie MD    Authorized by: Iain Mackenzie MD  Comparison: compared with previous ECG   Similar to previous ECG  Rhythm: sinus rhythm  Conduction: conduction normal  QRS axis: left  Other findings: non-specific ST-T wave changes and poor R wave " progression    Clinical impression: non-specific ECG            Assessment:       Diagnosis Plan   1. Paroxysmal atrial fibrillation        2. H/O amiodarone therapy        3. Anemia due to GI blood loss        4. Primary hypertension        5. CKD (chronic kidney disease) stage 4, GFR 15-29 ml/min        6. Stenosis of left carotid artery               Plan:       Ms Fernando has a reported history of chronic HFpEF, but she is euvolemic without taking diuretics.  She has hypertension and advanced renal insufficiency.  She has a history of atrial fibrillation and is on amiodarone.  Her amiodarone level was 1000, which is on the low end of the therapeutic range.  She is no longer anticoagulated due to recurrent severe GI bleeding requiring admission and transfusion.    She expresses a strong desire to take less medication, which is extremely reasonable given her age.  Despite her history of vascular disease, I do not feel that the statin provides additional benefit to her at her age.  I think we can stop amiodarone and see how she does.    We will see her back yearly.  Her blood pressure is within goal for female sex, age, and renal dysfunction.    Sincerely,       Iain Mackenzie MD

## 2024-05-05 ENCOUNTER — LAB REQUISITION (OUTPATIENT)
Dept: LAB | Facility: HOSPITAL | Age: 89
End: 2024-05-05
Payer: MEDICARE

## 2024-05-05 DIAGNOSIS — D64.9 ANEMIA, UNSPECIFIED: ICD-10-CM

## 2024-05-05 DIAGNOSIS — N39.0 URINARY TRACT INFECTION, SITE NOT SPECIFIED: ICD-10-CM

## 2024-05-05 LAB
25(OH)D3 SERPL-MCNC: 54.3 NG/ML (ref 30–100)
ALBUMIN SERPL-MCNC: 4.2 G/DL (ref 3.5–5.2)
ALBUMIN/GLOB SERPL: 1.4 G/DL
ALP SERPL-CCNC: 83 U/L (ref 39–117)
ALT SERPL W P-5'-P-CCNC: 13 U/L (ref 1–33)
ANION GAP SERPL CALCULATED.3IONS-SCNC: 12.5 MMOL/L (ref 5–15)
AST SERPL-CCNC: 24 U/L (ref 1–32)
BACTERIA UR QL AUTO: ABNORMAL /HPF
BASOPHILS # BLD AUTO: 0.04 10*3/MM3 (ref 0–0.2)
BASOPHILS NFR BLD AUTO: 0.5 % (ref 0–1.5)
BILIRUB SERPL-MCNC: 0.6 MG/DL (ref 0–1.2)
BILIRUB UR QL STRIP: NEGATIVE
BUN SERPL-MCNC: 31 MG/DL (ref 8–23)
BUN/CREAT SERPL: 19.7 (ref 7–25)
CALCIUM SPEC-SCNC: 9.6 MG/DL (ref 8.2–9.6)
CHLORIDE SERPL-SCNC: 102 MMOL/L (ref 98–107)
CLARITY UR: CLEAR
CO2 SERPL-SCNC: 29.5 MMOL/L (ref 22–29)
COLOR UR: YELLOW
CREAT SERPL-MCNC: 1.57 MG/DL (ref 0.57–1)
DEPRECATED RDW RBC AUTO: 44.9 FL (ref 37–54)
EGFRCR SERPLBLD CKD-EPI 2021: 30.6 ML/MIN/1.73
EOSINOPHIL # BLD AUTO: 0.19 10*3/MM3 (ref 0–0.4)
EOSINOPHIL NFR BLD AUTO: 2.5 % (ref 0.3–6.2)
ERYTHROCYTE [DISTWIDTH] IN BLOOD BY AUTOMATED COUNT: 13.2 % (ref 12.3–15.4)
FOLATE SERPL-MCNC: >20 NG/ML (ref 4.78–24.2)
GLOBULIN UR ELPH-MCNC: 2.9 GM/DL
GLUCOSE SERPL-MCNC: 103 MG/DL (ref 65–99)
GLUCOSE UR STRIP-MCNC: NEGATIVE MG/DL
HCT VFR BLD AUTO: 45.2 % (ref 34–46.6)
HGB BLD-MCNC: 15.6 G/DL (ref 12–15.9)
HGB UR QL STRIP.AUTO: ABNORMAL
HYALINE CASTS UR QL AUTO: ABNORMAL /LPF
IMM GRANULOCYTES # BLD AUTO: 0.03 10*3/MM3 (ref 0–0.05)
IMM GRANULOCYTES NFR BLD AUTO: 0.4 % (ref 0–0.5)
IRON 24H UR-MRATE: 94 MCG/DL (ref 37–145)
IRON SATN MFR SERPL: 30 % (ref 20–50)
KETONES UR QL STRIP: NEGATIVE
LEUKOCYTE ESTERASE UR QL STRIP.AUTO: ABNORMAL
LYMPHOCYTES # BLD AUTO: 2.65 10*3/MM3 (ref 0.7–3.1)
LYMPHOCYTES NFR BLD AUTO: 34.8 % (ref 19.6–45.3)
MCH RBC QN AUTO: 31.8 PG (ref 26.6–33)
MCHC RBC AUTO-ENTMCNC: 34.5 G/DL (ref 31.5–35.7)
MCV RBC AUTO: 92.2 FL (ref 79–97)
MONOCYTES # BLD AUTO: 0.76 10*3/MM3 (ref 0.1–0.9)
MONOCYTES NFR BLD AUTO: 10 % (ref 5–12)
NEUTROPHILS NFR BLD AUTO: 3.95 10*3/MM3 (ref 1.7–7)
NEUTROPHILS NFR BLD AUTO: 51.8 % (ref 42.7–76)
NITRITE UR QL STRIP: NEGATIVE
NRBC BLD AUTO-RTO: 0 /100 WBC (ref 0–0.2)
PH UR STRIP.AUTO: 7 [PH] (ref 4.5–8)
PLATELET # BLD AUTO: 223 10*3/MM3 (ref 140–450)
PMV BLD AUTO: 11.6 FL (ref 6–12)
POTASSIUM SERPL-SCNC: 3.3 MMOL/L (ref 3.5–5.2)
PROT SERPL-MCNC: 7.1 G/DL (ref 6–8.5)
PROT UR QL STRIP: NEGATIVE
RBC # BLD AUTO: 4.9 10*6/MM3 (ref 3.77–5.28)
RBC # UR STRIP: ABNORMAL /HPF
REF LAB TEST METHOD: ABNORMAL
SODIUM SERPL-SCNC: 144 MMOL/L (ref 136–145)
SP GR UR STRIP: 1.02 (ref 1–1.03)
SQUAMOUS #/AREA URNS HPF: ABNORMAL /HPF
TIBC SERPL-MCNC: 317 MCG/DL (ref 298–536)
TRANSFERRIN SERPL-MCNC: 213 MG/DL (ref 200–360)
UROBILINOGEN UR QL STRIP: ABNORMAL
VIT B12 BLD-MCNC: 414 PG/ML (ref 211–946)
WBC # UR STRIP: ABNORMAL /HPF
WBC NRBC COR # BLD AUTO: 7.62 10*3/MM3 (ref 3.4–10.8)

## 2024-05-05 PROCEDURE — 87086 URINE CULTURE/COLONY COUNT: CPT | Performed by: NURSE PRACTITIONER

## 2024-05-05 PROCEDURE — 85025 COMPLETE CBC W/AUTO DIFF WBC: CPT | Performed by: NURSE PRACTITIONER

## 2024-05-05 PROCEDURE — 82746 ASSAY OF FOLIC ACID SERUM: CPT | Performed by: NURSE PRACTITIONER

## 2024-05-05 PROCEDURE — 81001 URINALYSIS AUTO W/SCOPE: CPT | Performed by: NURSE PRACTITIONER

## 2024-05-05 PROCEDURE — 84466 ASSAY OF TRANSFERRIN: CPT | Performed by: NURSE PRACTITIONER

## 2024-05-05 PROCEDURE — 83540 ASSAY OF IRON: CPT | Performed by: NURSE PRACTITIONER

## 2024-05-05 PROCEDURE — 80053 COMPREHEN METABOLIC PANEL: CPT | Performed by: NURSE PRACTITIONER

## 2024-05-05 PROCEDURE — 82607 VITAMIN B-12: CPT | Performed by: NURSE PRACTITIONER

## 2024-05-05 PROCEDURE — 82306 VITAMIN D 25 HYDROXY: CPT | Performed by: NURSE PRACTITIONER

## 2024-05-06 LAB — BACTERIA SPEC AEROBE CULT: NORMAL

## (undated) DEVICE — LINER SURG CANSTR SXN S/RIGD 1500CC

## (undated) DEVICE — SINGLE-USE BIOPSY FORCEPS: Brand: RADIAL JAW 4

## (undated) DEVICE — APC PROBE 2200 A, SINGLE USE OD 2.3MM/6.9FR; L. 2.2M/7.2FT: Brand: ERBE

## (undated) DEVICE — TBG 02 CRUSH RESIST LF CLR 7FT

## (undated) DEVICE — SOL IRR H2O BTL 1000ML STRL

## (undated) DEVICE — BW-412T DISP COMBO CLEANING BRUSH: Brand: SINGLE USE COMBINATION CLEANING BRUSH

## (undated) DEVICE — THE TORRENT IRRIGATION SCOPE CONNECTOR IS USED WITH THE TORRENT IRRIGATION TUBING TO PROVIDE IRRIGATION FLUIDS SUCH AS STERILE WATER DURING GASTROINTESTINAL ENDOSCOPIC PROCEDURES WHEN USED IN CONJUNCTION WITH AN IRRIGATION PUMP (OR ELECTROSURGICAL UNIT).: Brand: TORRENT

## (undated) DEVICE — ERBE NESSY®PLATE 170 SPLIT; 168CM²; CABLE 3M: Brand: ERBE

## (undated) DEVICE — CANN NASL CO2 TRULINK W/O2 A/

## (undated) DEVICE — Device: Brand: SPOT EX ENDOSCOPIC TATTOO

## (undated) DEVICE — BITEBLOCK OMNI BLOC

## (undated) DEVICE — Device

## (undated) DEVICE — KT ORCA ORCAPOD DISP STRL

## (undated) DEVICE — THE BITE BLOCK MAXI, LATEX FREE STRAP IS USED TO PROTECT THE ENDOSCOPE INSERTION TUBE FROM BEING BITTEN BY THE PATIENT.

## (undated) DEVICE — SYR LL 3CC

## (undated) DEVICE — TUBING, SUCTION, 1/4" X 10', STRAIGHT: Brand: MEDLINE

## (undated) DEVICE — THE CARR-LOCKE INJECTION NEEDLE IS A SINGLE USE, DISPOSABLE, FLEXIBLE SHEATH INJECTION NEEDLE USED FOR THE INJECTION OF VARIOUS TYPES OF MEDIA THROUGH FLEXIBLE ENDOSCOPES.

## (undated) DEVICE — ADAPT CLN BIOGUARD AIR/H2O DISP

## (undated) DEVICE — Device: Brand: DEFENDO AIR/WATER/SUCTION AND BIOPSY VALVE